# Patient Record
Sex: FEMALE | Race: WHITE | Employment: OTHER | URBAN - METROPOLITAN AREA
[De-identification: names, ages, dates, MRNs, and addresses within clinical notes are randomized per-mention and may not be internally consistent; named-entity substitution may affect disease eponyms.]

---

## 2017-01-06 ENCOUNTER — HOSPITAL ENCOUNTER (OUTPATIENT)
Dept: BONE DENSITY | Age: 57
Discharge: HOME | End: 2017-01-06
Attending: INTERNAL MEDICINE
Payer: COMMERCIAL

## 2017-01-06 ENCOUNTER — HOSPITAL ENCOUNTER (OUTPATIENT)
Dept: ULTRASOUND IMAGING | Facility: HOSPITAL | Age: 57
Discharge: HOME | End: 2017-01-06
Attending: INTERNAL MEDICINE
Payer: COMMERCIAL

## 2017-01-06 ENCOUNTER — HOSPITAL ENCOUNTER (OUTPATIENT)
Dept: MAMMOGRAPHY | Age: 57
Discharge: HOME | End: 2017-01-06
Attending: INTERNAL MEDICINE
Payer: COMMERCIAL

## 2017-01-06 DIAGNOSIS — Z12.31 ENCOUNTER FOR SCREENING MAMMOGRAM FOR MALIGNANT NEOPLASM OF BREAST: ICD-10-CM

## 2017-01-06 DIAGNOSIS — R22.32 MASS OF LEFT WRIST: ICD-10-CM

## 2017-01-06 DIAGNOSIS — Z13.820 SCREENING FOR OSTEOPOROSIS: ICD-10-CM

## 2017-01-06 DIAGNOSIS — Z12.31 ENCOUNTER FOR SCREENING MAMMOGRAM FOR BREAST CANCER: ICD-10-CM

## 2017-01-06 PROCEDURE — 77067 SCR MAMMO BI INCL CAD: CPT

## 2017-01-06 PROCEDURE — 76882 US LMTD JT/FCL EVL NVASC XTR: CPT

## 2017-01-06 PROCEDURE — 77063 BREAST TOMOSYNTHESIS BI: CPT

## 2017-01-06 PROCEDURE — 77080 DXA BONE DENSITY AXIAL: CPT

## 2017-01-07 NOTE — PROGRESS NOTES
Quick Note:    Palpable abnormality corresponds with a lipoma in the left proximal forearm.   Essie Kolb MD  ______

## 2018-01-16 ENCOUNTER — HOSPITAL ENCOUNTER (OUTPATIENT)
Age: 58
Discharge: HOME | End: 2018-01-16
Attending: FAMILY MEDICINE
Payer: COMMERCIAL

## 2018-01-16 VITALS
OXYGEN SATURATION: 96 % | HEART RATE: 116 BPM | TEMPERATURE: 99 F | RESPIRATION RATE: 20 BRPM | SYSTOLIC BLOOD PRESSURE: 140 MMHG | DIASTOLIC BLOOD PRESSURE: 57 MMHG

## 2018-01-16 DIAGNOSIS — J06.9 VIRAL UPPER RESPIRATORY TRACT INFECTION: ICD-10-CM

## 2018-01-16 DIAGNOSIS — J98.01 ACUTE BRONCHOSPASM: Primary | ICD-10-CM

## 2018-01-16 PROCEDURE — 99214 OFFICE O/P EST MOD 30 MIN: CPT

## 2018-01-16 PROCEDURE — 94640 AIRWAY INHALATION TREATMENT: CPT

## 2018-01-16 RX ORDER — ALBUTEROL SULFATE 90 UG/1
2 AEROSOL, METERED RESPIRATORY (INHALATION) EVERY 4 HOURS PRN
Qty: 1 INHALER | Refills: 0 | Status: SHIPPED | OUTPATIENT
Start: 2018-01-16 | End: 2018-04-19

## 2018-01-16 RX ORDER — PROMETHAZINE HYDROCHLORIDE AND CODEINE PHOSPHATE 6.25; 1 MG/5ML; MG/5ML
5 SYRUP ORAL NIGHTLY PRN
Qty: 120 ML | Refills: 0 | Status: SHIPPED | OUTPATIENT
Start: 2018-01-16 | End: 2018-01-26

## 2018-01-16 RX ORDER — IPRATROPIUM BROMIDE AND ALBUTEROL SULFATE 2.5; .5 MG/3ML; MG/3ML
3 SOLUTION RESPIRATORY (INHALATION) ONCE
Status: COMPLETED | OUTPATIENT
Start: 2018-01-16 | End: 2018-01-16

## 2018-01-16 RX ORDER — PREDNISONE 20 MG/1
40 TABLET ORAL ONCE
Status: COMPLETED | OUTPATIENT
Start: 2018-01-16 | End: 2018-01-16

## 2018-01-16 RX ORDER — PREDNISONE 20 MG/1
TABLET ORAL
Qty: 8 TABLET | Refills: 0 | Status: SHIPPED | OUTPATIENT
Start: 2018-01-16 | End: 2018-03-05

## 2018-01-16 NOTE — ED NOTES
Patient's  called re:spouse's rx of Albuterol requiring prior authorization of Albuterol inhaler. Discussed with Manolo Sosa [facility] to substitute inhaler.   Called to [facility] in [facility]-talked to Pharmacist Jayant Carrillo made aware to substitute inhaler per

## 2018-01-16 NOTE — ED PROVIDER NOTES
Patient Seen in: [address] [facility] END    History   Patient presents with:  Cough/URI    Stated Complaint: short of breath / cough x  1 day    HPI    This 80-year-old female presents to the office with complaint of nasal congestion, sore t airway patent, uvula midline  NECK:  No cervical lymphadenopathy. No thyromegaly,  HEART: Regular rate and rhythm, no S3, S4 or murmur noted. LUNGS: Coarse BS with prolonged expiratory phase with occasional expiratory wheeze noted. No egophony noted.  No r 1/17/18. Qty: 8 tablet Refills: 0  Associated Diagnoses:Acute bronchospasm    Albuterol Sulfate  (90 Base) MCG/ACT Inhalation Aero Soln  Inhale 2 puffs into the lungs every 4 (four) hours as needed for Wheezing or Shortness of Breath.  CARRY YOUR IN

## 2018-01-16 NOTE — ED INITIAL ASSESSMENT (HPI)
Pt began 4 days ago with slight tickle in her throat then yesterday noted it became worse.   Pt with chills, cough, vomiting during the night , and sore throat and ears

## 2018-02-27 ENCOUNTER — PRIOR ORIGINAL RECORDS (OUTPATIENT)
Dept: OTHER | Age: 58
End: 2018-02-27

## 2018-02-27 PROCEDURE — 84432 ASSAY OF THYROGLOBULIN: CPT | Performed by: INTERNAL MEDICINE

## 2018-02-27 PROCEDURE — 88175 CYTOPATH C/V AUTO FLUID REDO: CPT | Performed by: INTERNAL MEDICINE

## 2018-02-27 PROCEDURE — 86800 THYROGLOBULIN ANTIBODY: CPT | Performed by: INTERNAL MEDICINE

## 2018-03-06 ENCOUNTER — HOSPITAL ENCOUNTER (OUTPATIENT)
Dept: MAMMOGRAPHY | Age: 58
Discharge: HOME | End: 2018-03-06
Attending: INTERNAL MEDICINE
Payer: COMMERCIAL

## 2018-03-06 DIAGNOSIS — Z12.31 ENCOUNTER FOR SCREENING MAMMOGRAM FOR MALIGNANT NEOPLASM OF BREAST: ICD-10-CM

## 2018-03-06 PROCEDURE — 77063 BREAST TOMOSYNTHESIS BI: CPT | Performed by: INTERNAL MEDICINE

## 2018-03-06 PROCEDURE — 77067 SCR MAMMO BI INCL CAD: CPT | Performed by: INTERNAL MEDICINE

## 2018-03-22 ENCOUNTER — PRIOR ORIGINAL RECORDS (OUTPATIENT)
Dept: OTHER | Age: 58
End: 2018-03-22

## 2018-03-26 ENCOUNTER — MYAURORA ACCOUNT LINK (OUTPATIENT)
Dept: OTHER | Age: 58
End: 2018-03-26

## 2018-03-26 ENCOUNTER — HOSPITAL ENCOUNTER (OUTPATIENT)
Dept: CV DIAGNOSTICS | Facility: HOSPITAL | Age: 58
Discharge: HOME | End: 2018-03-26
Attending: INTERNAL MEDICINE

## 2018-03-26 DIAGNOSIS — I25.10 CAD IN NATIVE ARTERY: ICD-10-CM

## 2018-03-28 LAB
ALBUMIN: 4.1 G/DL
ALKALINE PHOSPHATATE(ALK PHOS): 100 IU/L
BILIRUBIN TOTAL: 0.54 MG/DL
BUN: 16 MG/DL
CALCIUM: 9.5 MG/DL
CHLORIDE: 103 MEQ/L
CHOLESTEROL, TOTAL: 232 MG/DL
CREATININE, SERUM: 0.76 MG/DL
FREE T4: 1.53 MG/DL
GLUCOSE: 114 MG/DL
HDL CHOLESTEROL: 43 MG/DL
HEMATOCRIT: 44 %
HEMOGLOBIN: 15 G/DL
LDL CHOLESTEROL: 135 MG/DL
PLATELETS: 232 K/UL
POTASSIUM, SERUM: 4.2 MEQ/L
PROTEIN, TOTAL: 7.7 G/DL
RED BLOOD COUNT: 5.07 X 10-6/U
SGOT (AST): 18 IU/L
SGPT (ALT): 25 IU/L
SODIUM: 139 MEQ/L
THYROID STIMULATING HORMONE: 0.01 MLU/L
TRIGLYCERIDES: 271 MG/DL
VITAMIN D 25-OH: 18.04 NG/ML
WHITE BLOOD COUNT: 7.05 X 10-3/U

## 2018-04-02 ENCOUNTER — PRIOR ORIGINAL RECORDS (OUTPATIENT)
Dept: OTHER | Age: 58
End: 2018-04-02

## 2018-04-02 ENCOUNTER — HOSPITAL ENCOUNTER (OUTPATIENT)
Dept: CV DIAGNOSTICS | Facility: HOSPITAL | Age: 58
Discharge: HOME | End: 2018-04-02
Attending: INTERNAL MEDICINE
Payer: COMMERCIAL

## 2018-04-02 DIAGNOSIS — I25.10 CORONARY ATHEROSCLEROSIS OF NATIVE CORONARY ARTERY: ICD-10-CM

## 2018-04-02 DIAGNOSIS — R93.1 ABNORMAL SCREENING CARDIAC CT: ICD-10-CM

## 2018-04-02 PROCEDURE — 93018 CV STRESS TEST I&R ONLY: CPT | Performed by: INTERNAL MEDICINE

## 2018-04-02 PROCEDURE — 93017 CV STRESS TEST TRACING ONLY: CPT | Performed by: INTERNAL MEDICINE

## 2018-04-02 PROCEDURE — 78452 HT MUSCLE IMAGE SPECT MULT: CPT | Performed by: INTERNAL MEDICINE

## 2018-04-04 ENCOUNTER — PRIOR ORIGINAL RECORDS (OUTPATIENT)
Dept: OTHER | Age: 58
End: 2018-04-04

## 2018-04-19 ENCOUNTER — OFFICE VISIT (OUTPATIENT)
Dept: FAMILY MEDICINE CLINIC | Facility: CLINIC | Age: 58
End: 2018-04-19

## 2018-04-19 VITALS
HEIGHT: 68 IN | TEMPERATURE: 98 F | RESPIRATION RATE: 20 BRPM | DIASTOLIC BLOOD PRESSURE: 68 MMHG | WEIGHT: 248 LBS | BODY MASS INDEX: 37.59 KG/M2 | OXYGEN SATURATION: 99 % | SYSTOLIC BLOOD PRESSURE: 138 MMHG | HEART RATE: 86 BPM

## 2018-04-19 DIAGNOSIS — F41.9 ANXIETY AND DEPRESSION: ICD-10-CM

## 2018-04-19 DIAGNOSIS — F32.A ANXIETY AND DEPRESSION: ICD-10-CM

## 2018-04-19 DIAGNOSIS — I25.10 CORONARY ARTERY DISEASE INVOLVING NATIVE CORONARY ARTERY OF NATIVE HEART WITHOUT ANGINA PECTORIS: Primary | ICD-10-CM

## 2018-04-19 DIAGNOSIS — E03.9 HYPOTHYROIDISM (ACQUIRED): ICD-10-CM

## 2018-04-19 PROCEDURE — 99203 OFFICE O/P NEW LOW 30 MIN: CPT | Performed by: FAMILY MEDICINE

## 2018-04-19 NOTE — PROGRESS NOTES
HPI:   Bebe Vega is a 62year old female here to establish care. Pt has h/o hashimoto's hypothyroid   Pt seeing dietician and working out regularly.   s/p endo yesterday and meds reduced due to TSH of 0.008    Pt had a high calcium score on her h otherwise  SKIN: denies any unusual skin lesions  EYES:denies blurred vision or double vision  HEENT: denies nasal congestion, sinus pain or ST  LUNGS: denies shortness of breath with exertion  CARDIOVASCULAR: denies chest pain on exertion  GI: denies abdo

## 2018-05-09 ENCOUNTER — PRIOR ORIGINAL RECORDS (OUTPATIENT)
Dept: OTHER | Age: 58
End: 2018-05-09

## 2018-05-15 LAB
ALBUMIN: 4.4 G/DL
ALKALINE PHOSPHATATE(ALK PHOS): 108 IU/L
BILIRUBIN TOTAL: 0.6 MG/DL
BNP: 41 PMOL/L
BUN: 16 MG/DL
CALCIUM: 9.6 MG/DL
CHLORIDE: 107 MEQ/L
CHOLESTEROL, TOTAL: 108 MG/DL
CREATININE, SERUM: 0.71 MG/DL
FREE T4: 1.8 MG/DL
GLOBULIN: 3.1 G/DL
GLUCOSE: 124 MG/DL
HDL CHOLESTEROL: 44 MG/DL
HEMATOCRIT: 44.5 %
HEMOGLOBIN A1C: 5.6 %
HEMOGLOBIN: 15.5 G/DL
LDL CHOLESTEROL: 40 MG/DL
PLATELETS: 243 K/UL
POTASSIUM, SERUM: 4.2 MEQ/L
PROTEIN, TOTAL: 7.5 G/DL
RED BLOOD COUNT: 5.16 X 10-6/U
SGOT (AST): 24 IU/L
SGPT (ALT): 28 IU/L
SODIUM: 139 MEQ/L
THYROID STIMULATING HORMONE: <0.01 MLU/L
TRIGLYCERIDES: 154 MG/DL
VITAMIN D 25-OH: 50 NG/ML
WHITE BLOOD COUNT: 6.2 X 10-3/U

## 2018-05-16 ENCOUNTER — PRIOR ORIGINAL RECORDS (OUTPATIENT)
Dept: OTHER | Age: 58
End: 2018-05-16

## 2018-06-18 ENCOUNTER — PRIOR ORIGINAL RECORDS (OUTPATIENT)
Dept: OTHER | Age: 58
End: 2018-06-18

## 2018-07-09 ENCOUNTER — OFFICE VISIT (OUTPATIENT)
Dept: FAMILY MEDICINE CLINIC | Facility: CLINIC | Age: 58
End: 2018-07-09

## 2018-07-09 VITALS
OXYGEN SATURATION: 97 % | SYSTOLIC BLOOD PRESSURE: 138 MMHG | HEIGHT: 68 IN | DIASTOLIC BLOOD PRESSURE: 88 MMHG | WEIGHT: 238 LBS | RESPIRATION RATE: 18 BRPM | HEART RATE: 74 BPM | BODY MASS INDEX: 36.07 KG/M2

## 2018-07-09 DIAGNOSIS — I25.10 CORONARY ARTERY DISEASE INVOLVING NATIVE CORONARY ARTERY OF NATIVE HEART WITHOUT ANGINA PECTORIS: Primary | ICD-10-CM

## 2018-07-09 DIAGNOSIS — E03.9 HYPOTHYROIDISM (ACQUIRED): ICD-10-CM

## 2018-07-09 DIAGNOSIS — F32.A ANXIETY AND DEPRESSION: ICD-10-CM

## 2018-07-09 DIAGNOSIS — F41.9 ANXIETY AND DEPRESSION: ICD-10-CM

## 2018-07-09 PROCEDURE — 99214 OFFICE O/P EST MOD 30 MIN: CPT | Performed by: FAMILY MEDICINE

## 2018-07-09 RX ORDER — VENLAFAXINE HYDROCHLORIDE 37.5 MG/1
37.5 CAPSULE, EXTENDED RELEASE ORAL DAILY
Qty: 60 CAPSULE | Refills: 0 | Status: SHIPPED | OUTPATIENT
Start: 2018-07-09 | End: 2018-08-08

## 2018-07-09 RX ORDER — HYDROCHLOROTHIAZIDE 12.5 MG/1
12.5 TABLET ORAL DAILY
COMMUNITY
Start: 2018-06-18

## 2018-07-09 NOTE — PROGRESS NOTES
HPI:   Natalya Doss is a 62year old female here for medication follow up. Pt has h/o hashimoto's hypothyroid   TSH is still very low   Pt seeing dietician and working out regularly.     Pt had a high calcium score on her heart scan.  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   Diet: watches calories closely     REVIEW OF SYSTEMS:   GENERAL: feels well otherwise  SKIN: denies any unusual skin lesions  EYES:denies blurred vision or double vision  HEENT: denies nasal congestion,

## 2018-07-14 DIAGNOSIS — F41.9 ANXIETY AND DEPRESSION: ICD-10-CM

## 2018-07-14 DIAGNOSIS — F32.A ANXIETY AND DEPRESSION: ICD-10-CM

## 2018-07-16 RX ORDER — VENLAFAXINE HYDROCHLORIDE 37.5 MG/1
CAPSULE, EXTENDED RELEASE ORAL
Qty: 60 CAPSULE | Refills: 0 | OUTPATIENT
Start: 2018-07-16

## 2018-08-06 ENCOUNTER — LAB ENCOUNTER (OUTPATIENT)
Dept: LAB | Age: 58
End: 2018-08-06
Attending: FAMILY MEDICINE
Payer: COMMERCIAL

## 2018-08-06 DIAGNOSIS — E03.9 HYPOTHYROIDISM (ACQUIRED): ICD-10-CM

## 2018-08-06 LAB
T4 FREE SERPL-MCNC: 1.6 NG/DL (ref 0.9–1.8)
TSI SER-ACNC: 0.01 MIU/ML (ref 0.35–5.5)

## 2018-08-06 PROCEDURE — 84439 ASSAY OF FREE THYROXINE: CPT

## 2018-08-06 PROCEDURE — 84443 ASSAY THYROID STIM HORMONE: CPT

## 2018-08-07 NOTE — PROGRESS NOTES
Telephone Information:  Mobile          [phone]    [facility] regarding Dr. Howard Bhupinder result note. Hours and number given.

## 2018-08-08 ENCOUNTER — OFFICE VISIT (OUTPATIENT)
Dept: FAMILY MEDICINE CLINIC | Facility: CLINIC | Age: 58
End: 2018-08-08
Payer: COMMERCIAL

## 2018-08-08 VITALS
BODY MASS INDEX: 36.07 KG/M2 | RESPIRATION RATE: 20 BRPM | DIASTOLIC BLOOD PRESSURE: 78 MMHG | HEIGHT: 68 IN | HEART RATE: 86 BPM | SYSTOLIC BLOOD PRESSURE: 122 MMHG | OXYGEN SATURATION: 98 % | WEIGHT: 238 LBS | TEMPERATURE: 99 F

## 2018-08-08 DIAGNOSIS — F41.9 ANXIETY AND DEPRESSION: ICD-10-CM

## 2018-08-08 DIAGNOSIS — F32.A ANXIETY AND DEPRESSION: ICD-10-CM

## 2018-08-08 DIAGNOSIS — E06.3 HASHIMOTO'S THYROIDITIS: ICD-10-CM

## 2018-08-08 PROCEDURE — 99214 OFFICE O/P EST MOD 30 MIN: CPT | Performed by: FAMILY MEDICINE

## 2018-08-08 RX ORDER — VENLAFAXINE HYDROCHLORIDE 75 MG/1
75 CAPSULE, EXTENDED RELEASE ORAL DAILY
Qty: 90 CAPSULE | Refills: 1 | Status: SHIPPED | OUTPATIENT
Start: 2018-08-08 | End: 2018-08-16

## 2018-08-08 RX ORDER — LEVOTHYROXINE SODIUM 0.12 MG/1
125 TABLET ORAL
Qty: 90 TABLET | Refills: 0 | Status: SHIPPED | OUTPATIENT
Start: 2018-08-08 | End: 2018-08-14

## 2018-08-08 NOTE — PROGRESS NOTES
Patient informed of Dr. Lacho Villafana result note. Patient verbalized understanding and agrees with plan.     Patient states she is currently taking levothyroxine 150mcg

## 2018-08-08 NOTE — PROGRESS NOTES
Patient informed of Dr. Nehemiah Grover result note. Per patient she is having Dr. Jamil Zarco handle her thyroid from here on out.

## 2018-08-08 NOTE — PROGRESS NOTES
HPI:   Socrates Hsieh is a 62year old female here for medication follow up. Pt has h/o hashimoto's hypothyroid   TSH is still very low   Pt seeing dietician and working out regularly.     Pt had a high calcium score on her heart scan.  s/p cards eval . Children: 2   Retired teacher    Exercise: three times per week.   Diet: watches calories closely     REVIEW OF SYSTEMS:   GENERAL: feels well otherwise  SKIN: denies any unusual skin lesions  EYES:denies blurred vision or double vision  HEENT: denies kelsie

## 2018-08-14 DIAGNOSIS — E06.3 HASHIMOTO'S THYROIDITIS: ICD-10-CM

## 2018-08-15 RX ORDER — LEVOTHYROXINE SODIUM 0.12 MG/1
TABLET ORAL
Qty: 90 TABLET | Refills: 0 | Status: SHIPPED | OUTPATIENT
Start: 2018-08-15 | End: 2018-11-20

## 2018-08-16 ENCOUNTER — TELEPHONE (OUTPATIENT)
Dept: FAMILY MEDICINE CLINIC | Facility: CLINIC | Age: 58
End: 2018-08-16

## 2018-08-16 DIAGNOSIS — F32.A ANXIETY AND DEPRESSION: ICD-10-CM

## 2018-08-16 DIAGNOSIS — F41.9 ANXIETY AND DEPRESSION: ICD-10-CM

## 2018-08-16 RX ORDER — VENLAFAXINE HYDROCHLORIDE 75 MG/1
75 CAPSULE, EXTENDED RELEASE ORAL DAILY
Qty: 90 CAPSULE | Refills: 1 | Status: SHIPPED | OUTPATIENT
Start: 2018-08-16 | End: 2019-02-18

## 2018-09-25 PROCEDURE — 86256 FLUORESCENT ANTIBODY TITER: CPT | Performed by: FAMILY MEDICINE

## 2018-09-25 PROCEDURE — 83516 IMMUNOASSAY NONANTIBODY: CPT | Performed by: FAMILY MEDICINE

## 2018-09-25 PROCEDURE — 82784 ASSAY IGA/IGD/IGG/IGM EACH: CPT | Performed by: FAMILY MEDICINE

## 2018-11-20 DIAGNOSIS — E06.3 HASHIMOTO'S THYROIDITIS: ICD-10-CM

## 2018-11-21 RX ORDER — LEVOTHYROXINE SODIUM 0.12 MG/1
TABLET ORAL
Qty: 30 TABLET | Refills: 0 | Status: SHIPPED | OUTPATIENT
Start: 2018-11-21 | End: 2019-02-15

## 2018-12-07 ENCOUNTER — PRIOR ORIGINAL RECORDS (OUTPATIENT)
Dept: OTHER | Age: 58
End: 2018-12-07

## 2018-12-07 ENCOUNTER — LAB ENCOUNTER (OUTPATIENT)
Dept: LAB | Age: 58
End: 2018-12-07
Attending: INTERNAL MEDICINE
Payer: COMMERCIAL

## 2018-12-07 DIAGNOSIS — E03.9 ACQUIRED HYPOTHYROIDISM: ICD-10-CM

## 2018-12-07 DIAGNOSIS — I25.10 CORONARY ATHEROSCLEROSIS OF NATIVE CORONARY ARTERY: Primary | ICD-10-CM

## 2018-12-07 DIAGNOSIS — E78.00 PURE HYPERCHOLESTEROLEMIA: ICD-10-CM

## 2018-12-07 PROCEDURE — 80053 COMPREHEN METABOLIC PANEL: CPT

## 2018-12-07 PROCEDURE — 80061 LIPID PANEL: CPT

## 2018-12-07 PROCEDURE — 36415 COLL VENOUS BLD VENIPUNCTURE: CPT

## 2018-12-07 PROCEDURE — 84443 ASSAY THYROID STIM HORMONE: CPT

## 2018-12-07 PROCEDURE — 84436 ASSAY OF TOTAL THYROXINE: CPT

## 2018-12-10 LAB
ALBUMIN: 3.8 G/DL
ALKALINE PHOSPHATATE(ALK PHOS): 98 IU/L
ALT (SGPT): 30 U/L
AST (SGOT): 21 U/L
BILIRUBIN TOTAL: 0.5 MG/DL
BUN: 13 MG/DL
CALCIUM: 9.1 MG/DL
CHLORIDE: 107 MEQ/L
CHOLESTEROL, TOTAL: 113 MG/DL
CHOLESTEROL, TOTAL: 113 MG/DL
CREATININE, SERUM: 0.77 MG/DL
GLOBULIN: 3.7 G/DL
GLUCOSE: 115 MG/DL
GLUCOSE: 115 MG/DL
HDL CHOLESTEROL: 48 MG/DL
HDL CHOLESTEROL: 48 MG/DL
LDL CHOLESTEROL: 39 MG/DL
LDL CHOLESTEROL: 39 MG/DL
NON-HDL CHOLESTEROL: 65 MG/DL
NON-HDL CHOLESTEROL: 65 MG/DL
POTASSIUM, SERUM: 3.7 MEQ/L
PROTEIN, TOTAL: 7.5 G/DL
SGOT (AST): 21 IU/L
SGPT (ALT): 30 IU/L
SODIUM: 141 MEQ/L
T4(THYROXINE): 12.6 MG/DL
THYROID STIMULATING HORMONE: 0.02 MLU/L
TRIGLYCERIDES: 128 MG/DL
TRIGLYCERIDES: 128 MG/DL

## 2018-12-11 ENCOUNTER — PRIOR ORIGINAL RECORDS (OUTPATIENT)
Dept: OTHER | Age: 58
End: 2018-12-11

## 2018-12-11 ENCOUNTER — MYAURORA ACCOUNT LINK (OUTPATIENT)
Dept: OTHER | Age: 58
End: 2018-12-11

## 2018-12-13 ENCOUNTER — TELEPHONE (OUTPATIENT)
Dept: FAMILY MEDICINE CLINIC | Facility: CLINIC | Age: 58
End: 2018-12-13

## 2018-12-13 DIAGNOSIS — Z79.899 MEDICATION DOSE CHANGED: ICD-10-CM

## 2018-12-13 DIAGNOSIS — E03.9 HYPOTHYROIDISM (ACQUIRED): Primary | ICD-10-CM

## 2018-12-13 NOTE — TELEPHONE ENCOUNTER
Pt states Dr. Vega Miles, cardiology, is requesting for Dr. Vanda Buckner to review recent thyroid labs and advise pt.

## 2018-12-14 RX ORDER — LEVOTHYROXINE SODIUM 112 UG/1
112 TABLET ORAL
Qty: 60 TABLET | Refills: 0 | Status: SHIPPED | OUTPATIENT
Start: 2018-12-14 | End: 2019-01-24

## 2018-12-14 NOTE — TELEPHONE ENCOUNTER
Informed pt of LE's recommendations, RX sent to pharmacy, lab orders entered. Pt expressed understanding and agreement. Task completed.

## 2019-01-25 RX ORDER — LEVOTHYROXINE SODIUM 112 UG/1
TABLET ORAL
Qty: 60 TABLET | Refills: 0 | Status: SHIPPED | OUTPATIENT
Start: 2019-01-25 | End: 2019-02-15

## 2019-01-25 NOTE — TELEPHONE ENCOUNTER
Rx Request  Levothyroxine Sodium 112 MCG Oral Tab    Disp:        60            R: 0    Last Visit: 08/08/2018    Last Refilled: 12/14/2018    Protocol Passed?  Yes[  ]       No[ x ]  TSH DUE

## 2019-02-11 ENCOUNTER — LAB ENCOUNTER (OUTPATIENT)
Dept: LAB | Age: 59
End: 2019-02-11
Attending: FAMILY MEDICINE
Payer: COMMERCIAL

## 2019-02-11 DIAGNOSIS — Z79.899 MEDICATION DOSE CHANGED: ICD-10-CM

## 2019-02-11 DIAGNOSIS — E06.3 HASHIMOTO'S THYROIDITIS: ICD-10-CM

## 2019-02-11 DIAGNOSIS — E03.9 HYPOTHYROIDISM (ACQUIRED): ICD-10-CM

## 2019-02-11 DIAGNOSIS — R73.01 IMPAIRED FASTING GLUCOSE: ICD-10-CM

## 2019-02-11 LAB
T3FREE SERPL-MCNC: 3.11 PG/ML (ref 2.3–4.2)
T4 FREE SERPL-MCNC: 1.3 NG/DL (ref 0.9–1.8)
TSI SER-ACNC: 0.01 MIU/ML (ref 0.35–5.5)

## 2019-02-11 PROCEDURE — 84439 ASSAY OF FREE THYROXINE: CPT | Performed by: FAMILY MEDICINE

## 2019-02-11 PROCEDURE — 84443 ASSAY THYROID STIM HORMONE: CPT | Performed by: FAMILY MEDICINE

## 2019-02-11 PROCEDURE — 36415 COLL VENOUS BLD VENIPUNCTURE: CPT | Performed by: FAMILY MEDICINE

## 2019-02-11 PROCEDURE — 84481 FREE ASSAY (FT-3): CPT | Performed by: FAMILY MEDICINE

## 2019-02-18 NOTE — PROGRESS NOTES
HPI:   Amelie Leong is a 62year old female here for medication follow up. Pt has h/o hashimoto's hypothyroid   TSH is still very low   Pt seeing dietician and working out regularly.   Pt c/o weak nails and acne   Chronic hot flashes     Pt had a hi • Heart Attack Father    • Hypertension Maternal Grandfather    • Heart Attack Maternal Grandfather    • Colon Cancer Brother    • Heart Attack Paternal Uncle       Social History:   Social History    Tobacco Use      Smoking status: Former Smoker      S by mouth before breakfast.  Dispense: 30 tablet; Refill: 0    2. Anxiety and depression  cpm   - Venlafaxine HCl ER (EFFEXOR XR) 75 MG Oral Capsule SR 24 Hr; Take 1 capsule (75 mg total) by mouth daily. Dispense: 90 capsule;  Refill: 1      Questions answe

## 2019-02-28 VITALS
WEIGHT: 237 LBS | DIASTOLIC BLOOD PRESSURE: 70 MMHG | HEIGHT: 68 IN | SYSTOLIC BLOOD PRESSURE: 124 MMHG | HEART RATE: 75 BPM | BODY MASS INDEX: 35.92 KG/M2

## 2019-02-28 VITALS
SYSTOLIC BLOOD PRESSURE: 145 MMHG | WEIGHT: 236 LBS | DIASTOLIC BLOOD PRESSURE: 80 MMHG | HEIGHT: 68 IN | BODY MASS INDEX: 35.77 KG/M2 | HEART RATE: 65 BPM

## 2019-02-28 VITALS
HEIGHT: 68 IN | DIASTOLIC BLOOD PRESSURE: 82 MMHG | HEART RATE: 72 BPM | SYSTOLIC BLOOD PRESSURE: 122 MMHG | BODY MASS INDEX: 37.89 KG/M2 | WEIGHT: 250 LBS

## 2019-03-20 DIAGNOSIS — E03.9 HYPOTHYROIDISM (ACQUIRED): ICD-10-CM

## 2019-03-21 RX ORDER — ATORVASTATIN CALCIUM 20 MG/1
TABLET, FILM COATED ORAL
Qty: 30 TABLET | Refills: 5 | Status: SHIPPED | OUTPATIENT
Start: 2019-03-21 | End: 2019-11-03 | Stop reason: SDUPTHER

## 2019-03-21 RX ORDER — LEVOTHYROXINE SODIUM 112 UG/1
TABLET ORAL
Qty: 60 TABLET | Refills: 0 | OUTPATIENT
Start: 2019-03-21

## 2019-03-21 NOTE — TELEPHONE ENCOUNTER
Rx Request  Levothyroxine Sodium 88 MCG Oral Tab    Disp:         30           R: 0      Associated Dx: Hypothyroidism (acquired    Last Visit: 02/18/2019    Last Refilled: 02/18/2019

## 2019-03-22 RX ORDER — LEVOTHYROXINE SODIUM 88 UG/1
TABLET ORAL
Qty: 30 TABLET | Refills: 0 | Status: SHIPPED | OUTPATIENT
Start: 2019-03-22 | End: 2019-04-05

## 2019-03-22 RX ORDER — LEVOTHYROXINE SODIUM 88 UG/1
TABLET ORAL
Qty: 30 TABLET | Refills: 0 | Status: SHIPPED | OUTPATIENT
Start: 2019-03-22 | End: 2019-03-22

## 2019-03-24 ENCOUNTER — APPOINTMENT (OUTPATIENT)
Dept: GENERAL RADIOLOGY | Age: 59
End: 2019-03-24
Attending: EMERGENCY MEDICINE
Payer: COMMERCIAL

## 2019-03-24 ENCOUNTER — HOSPITAL ENCOUNTER (EMERGENCY)
Age: 59
Discharge: HOME | End: 2019-03-24
Attending: EMERGENCY MEDICINE
Payer: COMMERCIAL

## 2019-03-24 VITALS
HEART RATE: 78 BPM | DIASTOLIC BLOOD PRESSURE: 79 MMHG | HEIGHT: 68 IN | WEIGHT: 240 LBS | BODY MASS INDEX: 36.37 KG/M2 | SYSTOLIC BLOOD PRESSURE: 133 MMHG | RESPIRATION RATE: 18 BRPM | OXYGEN SATURATION: 96 %

## 2019-03-24 DIAGNOSIS — S16.1XXA NECK STRAIN, INITIAL ENCOUNTER: Primary | ICD-10-CM

## 2019-03-24 DIAGNOSIS — M54.6 ACUTE RIGHT-SIDED THORACIC BACK PAIN: ICD-10-CM

## 2019-03-24 PROCEDURE — 71101 X-RAY EXAM UNILAT RIBS/CHEST: CPT | Performed by: EMERGENCY MEDICINE

## 2019-03-24 PROCEDURE — 72040 X-RAY EXAM NECK SPINE 2-3 VW: CPT | Performed by: EMERGENCY MEDICINE

## 2019-03-24 PROCEDURE — 99284 EMERGENCY DEPT VISIT MOD MDM: CPT

## 2019-03-24 RX ORDER — CYCLOBENZAPRINE HCL 10 MG
10 TABLET ORAL 3 TIMES DAILY PRN
Qty: 20 TABLET | Refills: 0 | Status: SHIPPED | OUTPATIENT
Start: 2019-03-24 | End: 2019-03-31

## 2019-03-24 NOTE — ED INITIAL ASSESSMENT (HPI)
Pt states that she fell last night at 0300 \"in the closet. I went to get the dog and I fell onto my face and neck. I'm having pain from my neck, scapula, and clavicle on the right side\". Pt denies LOC.

## 2019-03-24 NOTE — ED PROVIDER NOTES
Patient Seen in: [address] [facility] In [place]    History   Patient presents with:  Upper Extremity Injury (musculoskeletal)    Stated Complaint: fall during the night, right shoulder and face discomfort    HPI    57-year-old female presents reactive to light. Neck: There is some midline posterior lower cervical tenderness without crepitations or step-offs. There is bilateral paraspinous muscle spasm, right greater than left. No adenopathy or thyromegaly. Lungs are clear to auscultation. R-0

## 2019-03-25 RX ORDER — LEVOTHYROXINE SODIUM 112 UG/1
TABLET ORAL
Qty: 60 TABLET | Refills: 0 | OUTPATIENT
Start: 2019-03-25

## 2019-04-02 RX ORDER — HYDROCHLOROTHIAZIDE 12.5 MG/1
TABLET ORAL
COMMUNITY
Start: 2018-06-18 | End: 2019-08-13 | Stop reason: SDUPTHER

## 2019-04-02 RX ORDER — LEVOTHYROXINE SODIUM 88 UG/1
TABLET ORAL
COMMUNITY
Start: 2018-06-08

## 2019-04-04 ENCOUNTER — APPOINTMENT (OUTPATIENT)
Dept: LAB | Age: 59
End: 2019-04-04
Attending: FAMILY MEDICINE
Payer: COMMERCIAL

## 2019-04-04 DIAGNOSIS — E06.3 HASHIMOTO'S THYROIDITIS: ICD-10-CM

## 2019-04-04 PROCEDURE — 36415 COLL VENOUS BLD VENIPUNCTURE: CPT | Performed by: FAMILY MEDICINE

## 2019-04-04 PROCEDURE — 84481 FREE ASSAY (FT-3): CPT | Performed by: FAMILY MEDICINE

## 2019-04-04 PROCEDURE — 84439 ASSAY OF FREE THYROXINE: CPT | Performed by: FAMILY MEDICINE

## 2019-04-04 PROCEDURE — 84443 ASSAY THYROID STIM HORMONE: CPT | Performed by: FAMILY MEDICINE

## 2019-04-25 RX ORDER — LEVOTHYROXINE SODIUM 112 UG/1
TABLET ORAL
Qty: 60 TABLET | Refills: 0 | Status: SHIPPED | OUTPATIENT
Start: 2019-04-25 | End: 2020-01-20

## 2019-06-03 ENCOUNTER — LAB ENCOUNTER (OUTPATIENT)
Dept: LAB | Age: 59
End: 2019-06-03
Attending: INTERNAL MEDICINE
Payer: COMMERCIAL

## 2019-06-03 DIAGNOSIS — E78.1 PURE HYPERGLYCERIDEMIA: ICD-10-CM

## 2019-06-03 DIAGNOSIS — I25.10 CORONARY ATHEROSCLEROSIS OF NATIVE CORONARY ARTERY: Primary | ICD-10-CM

## 2019-06-03 DIAGNOSIS — E78.00 PURE HYPERCHOLESTEROLEMIA: ICD-10-CM

## 2019-06-03 LAB
ALBUMIN SERPL-MCNC: 3.7 G/DL
ALBUMIN/GLOB SERPL: NORMAL {RATIO}
ALP SERPL-CCNC: 100 U/L
ALT SERPL-CCNC: 29 U/L
ANION GAP SERPL CALC-SCNC: NORMAL MMOL/L
AST SERPL-CCNC: 20 U/L
BILIRUB SERPL-MCNC: 0.7 MG/DL
BUN SERPL-MCNC: 13 MG/DL
BUN/CREAT SERPL: NORMAL
CALCIUM SERPL-MCNC: 9.3 MG/DL
CHLORIDE SERPL-SCNC: 108 MMOL/L
CHOLEST SERPL-MCNC: 135 MG/DL
CHOLEST/HDLC SERPL: NORMAL {RATIO}
CO2 SERPL-SCNC: NORMAL MMOL/L
CREAT SERPL-MCNC: 0.75 MG/DL
GLOBULIN SER-MCNC: 3.8 G/DL
GLUCOSE SERPL-MCNC: 123 MG/DL
HDLC SERPL-MCNC: 54 MG/DL
LDLC SERPL CALC-MCNC: 52 MG/DL
LENGTH OF FAST TIME PATIENT: NORMAL H
LENGTH OF FAST TIME PATIENT: NORMAL H
NONHDLC SERPL-MCNC: NORMAL MG/DL
POTASSIUM SERPL-SCNC: 3.6 MMOL/L
PROT SERPL-MCNC: 7.5 G/DL
SODIUM SERPL-SCNC: 139 MMOL/L
T4 SERPL-MCNC: 10.1 UG/DL
TRIGL SERPL-MCNC: 145 MG/DL
TSH SERPL-ACNC: 0.43 M[IU]/L
VLDLC SERPL CALC-MCNC: NORMAL MG/DL

## 2019-06-03 PROCEDURE — 80053 COMPREHEN METABOLIC PANEL: CPT

## 2019-06-03 PROCEDURE — 84436 ASSAY OF TOTAL THYROXINE: CPT

## 2019-06-03 PROCEDURE — 84443 ASSAY THYROID STIM HORMONE: CPT

## 2019-06-03 PROCEDURE — 80061 LIPID PANEL: CPT

## 2019-06-03 PROCEDURE — 36415 COLL VENOUS BLD VENIPUNCTURE: CPT

## 2019-06-05 ENCOUNTER — TELEPHONE (OUTPATIENT)
Dept: CARDIOLOGY | Age: 59
End: 2019-06-05

## 2019-06-07 ENCOUNTER — CLINICAL ABSTRACT (OUTPATIENT)
Dept: CARDIOLOGY | Age: 59
End: 2019-06-07

## 2019-06-11 ENCOUNTER — OFFICE VISIT (OUTPATIENT)
Dept: CARDIOLOGY | Age: 59
End: 2019-06-11

## 2019-06-11 VITALS
HEIGHT: 68 IN | DIASTOLIC BLOOD PRESSURE: 86 MMHG | BODY MASS INDEX: 38.19 KG/M2 | SYSTOLIC BLOOD PRESSURE: 124 MMHG | WEIGHT: 252 LBS | HEART RATE: 80 BPM

## 2019-06-11 DIAGNOSIS — E78.00 PURE HYPERCHOLESTEROLEMIA: ICD-10-CM

## 2019-06-11 DIAGNOSIS — R53.81 MALAISE AND FATIGUE: ICD-10-CM

## 2019-06-11 DIAGNOSIS — E78.1 HYPERTRIGLYCERIDEMIA: ICD-10-CM

## 2019-06-11 DIAGNOSIS — I25.10 CORONARY ARTERY CALCIFICATION SEEN ON CT SCAN: ICD-10-CM

## 2019-06-11 DIAGNOSIS — R55 SYNCOPE, UNSPECIFIED SYNCOPE TYPE: ICD-10-CM

## 2019-06-11 DIAGNOSIS — R53.83 MALAISE AND FATIGUE: ICD-10-CM

## 2019-06-11 DIAGNOSIS — I10 HYPERTENSION, BENIGN: ICD-10-CM

## 2019-06-11 DIAGNOSIS — R06.02 SHORTNESS OF BREATH: Primary | ICD-10-CM

## 2019-06-11 DIAGNOSIS — E55.9 VITAMIN D DEFICIENCY: ICD-10-CM

## 2019-06-11 DIAGNOSIS — I25.10 CORONARY ARTERY DISEASE INVOLVING NATIVE CORONARY ARTERY OF NATIVE HEART WITHOUT ANGINA PECTORIS: ICD-10-CM

## 2019-06-11 PROBLEM — R06.00 DYSPNEA: Status: ACTIVE | Noted: 2018-03-22

## 2019-06-11 PROCEDURE — 3074F SYST BP LT 130 MM HG: CPT | Performed by: INTERNAL MEDICINE

## 2019-06-11 PROCEDURE — 99214 OFFICE O/P EST MOD 30 MIN: CPT | Performed by: INTERNAL MEDICINE

## 2019-06-11 PROCEDURE — 3079F DIAST BP 80-89 MM HG: CPT | Performed by: INTERNAL MEDICINE

## 2019-06-11 RX ORDER — VENLAFAXINE 75 MG/1
75 TABLET ORAL DAILY
COMMUNITY

## 2019-06-11 SDOH — HEALTH STABILITY: PHYSICAL HEALTH: ON AVERAGE, HOW MANY MINUTES DO YOU ENGAGE IN EXERCISE AT THIS LEVEL?: 60 MIN

## 2019-06-11 SDOH — HEALTH STABILITY: PHYSICAL HEALTH: ON AVERAGE, HOW MANY DAYS PER WEEK DO YOU ENGAGE IN MODERATE TO STRENUOUS EXERCISE (LIKE A BRISK WALK)?: 4 DAYS

## 2019-06-18 ENCOUNTER — HOSPITAL ENCOUNTER (OUTPATIENT)
Dept: CV DIAGNOSTICS | Age: 59
Discharge: HOME | End: 2019-06-18
Attending: INTERNAL MEDICINE
Payer: COMMERCIAL

## 2019-06-18 DIAGNOSIS — R55 SYNCOPE, UNSPECIFIED SYNCOPE TYPE: ICD-10-CM

## 2019-06-18 PROCEDURE — 93880 EXTRACRANIAL BILAT STUDY: CPT | Performed by: INTERNAL MEDICINE

## 2019-06-19 ENCOUNTER — TELEPHONE (OUTPATIENT)
Dept: CARDIOLOGY | Age: 59
End: 2019-06-19

## 2019-06-19 DIAGNOSIS — R55 SYNCOPE, UNSPECIFIED SYNCOPE TYPE: ICD-10-CM

## 2019-08-05 NOTE — PROGRESS NOTES
HPI:   Berta Morton is a 61year old female who presents for a complete physical exam.     Wt Readings from Last 6 Encounters:  08/06/19 : 255 lb  03/24/19 : 240 lb  02/18/19 : 243 lb  09/06/18 : 238 lb  08/08/18 : 238 lb  07/09/18 : 238 lb    Body mas tablet Rfl: 0   Venlafaxine HCl ER (EFFEXOR XR) 75 MG Oral Capsule SR 24 Hr Take 1 capsule (75 mg total) by mouth daily.  Disp: 90 capsule Rfl: 1   hydrochlorothiazide 12.5 MG Oral Tab  Disp:  Rfl:    BABY ASPIRIN OR  Disp:  Rfl:    atorvastatin 20 MG Oral headaches  PSYCHE: denies depression or anxiety  HEMATOLOGIC: denies hx of anemia  ENDOCRINE:  thyroid history  ALL/ASTHMA: denies hx of allergy or asthma    EXAM:   /84   Pulse 88   Temp 98.4 °F (36.9 °C) (Oral)   Resp 16   Ht 68\"   Wt 255 lb   SpO patient indicates understanding of these issues and agrees to the plan. Follow up in 6mo or sooner if needed   . HPI:   Sinai Arnold is a 61year old female here for medication follow up.      Pt has h/o hashimoto's hypothyroid   TSH is now normal • Heart Attack Father    • Hypertension Maternal Grandfather    • Heart Attack Maternal Grandfather    • Colon Cancer Brother    • Heart Attack Paternal Uncle       Social History:   Social History    Tobacco Use      Smoking status: Former Smoker      S mouth daily. Dispense: 90 capsule; Refill: 1    6. Hypothyroidism (acquired)    - Levothyroxine Sodium 88 MCG Oral Tab; TAKE 1 TABLET BY MOUTH ONE TIME A DAY before breakfast  Dispense: 90 tablet;  Refill: 0  d    Questions answered and patient indicates u

## 2019-08-06 ENCOUNTER — OFFICE VISIT (OUTPATIENT)
Dept: FAMILY MEDICINE CLINIC | Facility: CLINIC | Age: 59
End: 2019-08-06
Payer: COMMERCIAL

## 2019-08-06 VITALS
RESPIRATION RATE: 16 BRPM | HEART RATE: 88 BPM | HEIGHT: 68 IN | WEIGHT: 255 LBS | DIASTOLIC BLOOD PRESSURE: 84 MMHG | SYSTOLIC BLOOD PRESSURE: 124 MMHG | BODY MASS INDEX: 38.65 KG/M2 | OXYGEN SATURATION: 98 % | TEMPERATURE: 98 F

## 2019-08-06 DIAGNOSIS — E03.9 HYPOTHYROIDISM (ACQUIRED): ICD-10-CM

## 2019-08-06 DIAGNOSIS — Z13.820 SCREENING FOR OSTEOPOROSIS: ICD-10-CM

## 2019-08-06 DIAGNOSIS — F32.A ANXIETY AND DEPRESSION: ICD-10-CM

## 2019-08-06 DIAGNOSIS — F41.9 ANXIETY AND DEPRESSION: ICD-10-CM

## 2019-08-06 DIAGNOSIS — Z00.00 ANNUAL PHYSICAL EXAM: ICD-10-CM

## 2019-08-06 DIAGNOSIS — Z12.31 ENCOUNTER FOR MAMMOGRAM TO ESTABLISH BASELINE MAMMOGRAM: ICD-10-CM

## 2019-08-06 DIAGNOSIS — Z01.419 WELL WOMAN EXAM WITH ROUTINE GYNECOLOGICAL EXAM: Primary | ICD-10-CM

## 2019-08-06 PROCEDURE — 99396 PREV VISIT EST AGE 40-64: CPT | Performed by: FAMILY MEDICINE

## 2019-08-06 PROCEDURE — 99213 OFFICE O/P EST LOW 20 MIN: CPT | Performed by: FAMILY MEDICINE

## 2019-08-06 RX ORDER — VENLAFAXINE HYDROCHLORIDE 75 MG/1
75 CAPSULE, EXTENDED RELEASE ORAL DAILY
Qty: 90 CAPSULE | Refills: 1 | Status: SHIPPED | OUTPATIENT
Start: 2019-08-06 | End: 2020-01-20

## 2019-08-06 RX ORDER — LEVOTHYROXINE SODIUM 88 UG/1
TABLET ORAL
Qty: 90 TABLET | Refills: 0 | Status: SHIPPED | OUTPATIENT
Start: 2019-08-06 | End: 2019-11-10

## 2019-08-10 ENCOUNTER — APPOINTMENT (OUTPATIENT)
Dept: GENERAL RADIOLOGY | Age: 59
End: 2019-08-10
Attending: FAMILY MEDICINE
Payer: COMMERCIAL

## 2019-08-10 ENCOUNTER — HOSPITAL ENCOUNTER (OUTPATIENT)
Age: 59
Discharge: HOME | End: 2019-08-10
Attending: FAMILY MEDICINE
Payer: COMMERCIAL

## 2019-08-10 VITALS
RESPIRATION RATE: 18 BRPM | SYSTOLIC BLOOD PRESSURE: 130 MMHG | HEART RATE: 84 BPM | OXYGEN SATURATION: 99 % | DIASTOLIC BLOOD PRESSURE: 97 MMHG | TEMPERATURE: 98 F

## 2019-08-10 DIAGNOSIS — S82.832A CLOSED AVULSION FRACTURE OF DISTAL FIBULA, LEFT, INITIAL ENCOUNTER: Primary | ICD-10-CM

## 2019-08-10 DIAGNOSIS — S99.912A LEFT ANKLE INJURY, INITIAL ENCOUNTER: ICD-10-CM

## 2019-08-10 PROCEDURE — 99214 OFFICE O/P EST MOD 30 MIN: CPT

## 2019-08-10 PROCEDURE — 73610 X-RAY EXAM OF ANKLE: CPT | Performed by: FAMILY MEDICINE

## 2019-08-10 PROCEDURE — 73630 X-RAY EXAM OF FOOT: CPT | Performed by: FAMILY MEDICINE

## 2019-08-10 RX ORDER — ARM BRACE
EACH MISCELLANEOUS
Qty: 1 EACH | Refills: 0 | Status: SHIPPED | OUTPATIENT
Start: 2019-08-10 | End: 2020-09-03

## 2019-08-10 NOTE — ED PROVIDER NOTES
Patient Seen in: [facility] [place] END    History   Patient presents with:   Ankle Pain    Stated Complaint: LEFT ANKLE INJURY/PAIN X 3 DAYS    HPI    This 59-year-old female presents to the office with complaint of left ankle/foot injury s Current:BP (!) 130/97   Pulse 84   Temp 98 °F (36.7 °C)   Resp 18   SpO2 99%         Physical Exam    General: WH/obese/WD, in NAD at rest, A and O times 3  HEAD: Normocephalic, atraumatic  EYES: Sclera anicteric,  conjunctiva normal.  EARS: Tympanic of the lateral malleolus, suspicious for potential avulsion of the anterior talofibular ligament. There is associated moderate soft tissue swelling overlying the lateral malleolus. No additional osseous abnormalities. CONCLUSION:  1.  Small nondispla doses for ibuprofen are discussed. A copy of the x-ray disc is given. She is to follow-up with orthopedics in the next 2 to 3 days for fracture care.     Disposition and Plan     Clinical Impression:  Closed avulsion fracture of distal fibula, left, initi

## 2019-08-10 NOTE — ED INITIAL ASSESSMENT (HPI)
C/O left ankle pain that occurred on Thursday, has had frequent injuries to this ankle but no fractures.

## 2019-08-13 RX ORDER — HYDROCHLOROTHIAZIDE 12.5 MG/1
TABLET ORAL
Qty: 60 TABLET | Refills: 6 | Status: SHIPPED | OUTPATIENT
Start: 2019-08-13 | End: 2019-11-11 | Stop reason: SDUPTHER

## 2019-09-27 ENCOUNTER — HOSPITAL ENCOUNTER (OUTPATIENT)
Dept: MAMMOGRAPHY | Age: 59
Discharge: HOME | End: 2019-09-27
Attending: FAMILY MEDICINE
Payer: COMMERCIAL

## 2019-09-27 DIAGNOSIS — Z12.31 ENCOUNTER FOR MAMMOGRAM TO ESTABLISH BASELINE MAMMOGRAM: ICD-10-CM

## 2019-09-27 PROCEDURE — 77067 SCR MAMMO BI INCL CAD: CPT | Performed by: FAMILY MEDICINE

## 2019-09-27 PROCEDURE — 77063 BREAST TOMOSYNTHESIS BI: CPT | Performed by: FAMILY MEDICINE

## 2019-11-04 RX ORDER — ATORVASTATIN CALCIUM 20 MG/1
TABLET, FILM COATED ORAL
Qty: 30 TABLET | Refills: 6 | Status: SHIPPED | OUTPATIENT
Start: 2019-11-04 | End: 2019-12-17 | Stop reason: SDUPTHER

## 2019-11-10 DIAGNOSIS — E03.9 HYPOTHYROIDISM (ACQUIRED): ICD-10-CM

## 2019-11-11 DIAGNOSIS — E03.9 HYPOTHYROIDISM (ACQUIRED): ICD-10-CM

## 2019-11-11 DIAGNOSIS — F32.A ANXIETY AND DEPRESSION: ICD-10-CM

## 2019-11-11 DIAGNOSIS — F41.9 ANXIETY AND DEPRESSION: ICD-10-CM

## 2019-11-11 RX ORDER — LEVOTHYROXINE SODIUM 88 UG/1
TABLET ORAL
Qty: 90 TABLET | Refills: 0 | OUTPATIENT
Start: 2019-11-11

## 2019-11-11 RX ORDER — HYDROCHLOROTHIAZIDE 12.5 MG/1
TABLET ORAL
Qty: 90 TABLET | Refills: 0 | Status: SHIPPED | OUTPATIENT
Start: 2019-11-11 | End: 2019-12-17 | Stop reason: SDUPTHER

## 2019-11-11 RX ORDER — ATORVASTATIN CALCIUM 20 MG/1
TABLET, FILM COATED ORAL
Qty: 30 TABLET | Refills: 4 | OUTPATIENT
Start: 2019-11-11

## 2019-11-11 RX ORDER — LEVOTHYROXINE SODIUM 88 UG/1
TABLET ORAL
Qty: 90 TABLET | Refills: 0 | Status: SHIPPED | OUTPATIENT
Start: 2019-11-11 | End: 2020-02-13

## 2019-11-11 RX ORDER — VENLAFAXINE HYDROCHLORIDE 75 MG/1
CAPSULE, EXTENDED RELEASE ORAL
Qty: 30 CAPSULE | Refills: 0 | Status: SHIPPED | OUTPATIENT
Start: 2019-11-11 | End: 2020-01-22

## 2019-12-13 ENCOUNTER — LAB ENCOUNTER (OUTPATIENT)
Dept: LAB | Age: 59
End: 2019-12-13
Attending: FAMILY MEDICINE
Payer: COMMERCIAL

## 2019-12-13 DIAGNOSIS — E03.9 HYPOTHYROIDISM (ACQUIRED): ICD-10-CM

## 2019-12-13 DIAGNOSIS — R73.09 ELEVATED GLUCOSE: ICD-10-CM

## 2019-12-13 DIAGNOSIS — Z00.00 ANNUAL PHYSICAL EXAM: ICD-10-CM

## 2019-12-13 LAB
25(OH)D3+25(OH)D2 SERPL-MCNC: 38.9 NG/ML
ABSOLUTE IMMATURE GRANULOCYTES (OFFPRE24): NORMAL
ALBUMIN SERPL-MCNC: 3.7 G/DL
ALBUMIN/GLOB SERPL: NORMAL {RATIO}
ALP SERPL-CCNC: 101 U/L
ALT SERPL-CCNC: 30 U/L
ANION GAP SERPL CALC-SCNC: NORMAL MMOL/L
AST SERPL-CCNC: 22 U/L
BASO+EOS+MONOS # BLD: NORMAL 10*3/UL
BASO+EOS+MONOS NFR BLD: NORMAL %
BASOPHILS # BLD: NORMAL 10*3/UL
BASOPHILS NFR BLD: NORMAL %
BILIRUB SERPL-MCNC: 0.6 MG/DL
BUN SERPL-MCNC: 13 MG/DL
BUN/CREAT SERPL: NORMAL
CALCIUM SERPL-MCNC: 9.1 MG/DL
CHLORIDE SERPL-SCNC: 108 MMOL/L
CHOLEST SERPL-MCNC: 136 MG/DL
CHOLEST/HDLC SERPL: NORMAL {RATIO}
CO2 SERPL-SCNC: NORMAL MMOL/L
CREAT SERPL-MCNC: 0.84 MG/DL
DIFFERENTIAL METHOD BLD: NORMAL
EOSINOPHIL # BLD: NORMAL 10*3/UL
EOSINOPHIL NFR BLD: NORMAL %
ERYTHROCYTE [DISTWIDTH] IN BLOOD: NORMAL %
EST. AVERAGE GLUCOSE BLD GHB EST-MCNC: NORMAL MG/DL
GLOBULIN SER-MCNC: 4.2 G/DL
GLUCOSE SERPL-MCNC: 122 MG/DL
HBA1C MFR BLD: 6 %
HBA1C MFR BLD: NORMAL % (ref 4.5–5.6)
HCT VFR BLD CALC: 46 %
HDLC SERPL-MCNC: 52 MG/DL
HGB BLD-MCNC: 15.4 G/DL
IMMATURE GRANULOCYTES (OFFPRE25): NORMAL
LDLC SERPL CALC-MCNC: 61 MG/DL
LENGTH OF FAST TIME PATIENT: YES H
LENGTH OF FAST TIME PATIENT: YES H
LYMPHOCYTES # BLD: NORMAL 10*3/UL
LYMPHOCYTES NFR BLD: NORMAL %
MCH RBC QN AUTO: NORMAL PG
MCHC RBC AUTO-ENTMCNC: NORMAL G/DL
MCV RBC AUTO: NORMAL FL
MONOCYTES # BLD: NORMAL 10*3/UL
MONOCYTES NFR BLD: NORMAL %
MPV (OFFPRE2): NORMAL
NEUTROPHILS # BLD: NORMAL 10*3/UL
NEUTROPHILS NFR BLD: NORMAL %
NONHDLC SERPL-MCNC: 84 MG/DL
NRBC BLD MANUAL-RTO: NORMAL %
PLAT MORPH BLD: NORMAL
PLATELET # BLD: 257 10*3/UL
POTASSIUM SERPL-SCNC: 4.1 MMOL/L
PROT SERPL-MCNC: 7.9 G/DL
RBC # BLD: 5.23 10*6/UL
RBC MORPH BLD: NORMAL
SODIUM SERPL-SCNC: 139 MMOL/L
T4 FREE SERPL-MCNC: 0.9 NG/DL
TRIGL SERPL-MCNC: 117 MG/DL
TSH SERPL-ACNC: 2.21 M[IU]/L
VLDLC SERPL CALC-MCNC: NORMAL MG/DL
WBC # BLD: 8.7 10*3/UL
WBC MORPH BLD: NORMAL

## 2019-12-13 PROCEDURE — 84481 FREE ASSAY (FT-3): CPT | Performed by: FAMILY MEDICINE

## 2019-12-13 PROCEDURE — 82306 VITAMIN D 25 HYDROXY: CPT | Performed by: FAMILY MEDICINE

## 2019-12-13 PROCEDURE — 82607 VITAMIN B-12: CPT | Performed by: FAMILY MEDICINE

## 2019-12-13 PROCEDURE — 80050 GENERAL HEALTH PANEL: CPT | Performed by: FAMILY MEDICINE

## 2019-12-13 PROCEDURE — 36415 COLL VENOUS BLD VENIPUNCTURE: CPT | Performed by: FAMILY MEDICINE

## 2019-12-13 PROCEDURE — 80061 LIPID PANEL: CPT | Performed by: FAMILY MEDICINE

## 2019-12-13 PROCEDURE — 83036 HEMOGLOBIN GLYCOSYLATED A1C: CPT | Performed by: FAMILY MEDICINE

## 2019-12-13 PROCEDURE — 84439 ASSAY OF FREE THYROXINE: CPT | Performed by: FAMILY MEDICINE

## 2019-12-14 DIAGNOSIS — R73.09 ELEVATED GLUCOSE: Primary | ICD-10-CM

## 2019-12-17 ENCOUNTER — OFFICE VISIT (OUTPATIENT)
Dept: CARDIOLOGY | Age: 59
End: 2019-12-17

## 2019-12-17 VITALS
HEART RATE: 83 BPM | DIASTOLIC BLOOD PRESSURE: 70 MMHG | SYSTOLIC BLOOD PRESSURE: 121 MMHG | WEIGHT: 263 LBS | HEIGHT: 68 IN | BODY MASS INDEX: 39.86 KG/M2

## 2019-12-17 DIAGNOSIS — R06.02 SHORTNESS OF BREATH: Primary | ICD-10-CM

## 2019-12-17 DIAGNOSIS — R53.81 MALAISE AND FATIGUE: ICD-10-CM

## 2019-12-17 DIAGNOSIS — I25.10 CORONARY ARTERY CALCIFICATION SEEN ON CT SCAN: ICD-10-CM

## 2019-12-17 DIAGNOSIS — R53.83 MALAISE AND FATIGUE: ICD-10-CM

## 2019-12-17 DIAGNOSIS — I10 HYPERTENSION, BENIGN: ICD-10-CM

## 2019-12-17 DIAGNOSIS — E55.9 VITAMIN D DEFICIENCY: ICD-10-CM

## 2019-12-17 DIAGNOSIS — I25.10 CORONARY ARTERY DISEASE INVOLVING NATIVE CORONARY ARTERY OF NATIVE HEART WITHOUT ANGINA PECTORIS: ICD-10-CM

## 2019-12-17 DIAGNOSIS — E78.1 HYPERTRIGLYCERIDEMIA: ICD-10-CM

## 2019-12-17 DIAGNOSIS — E78.00 PURE HYPERCHOLESTEROLEMIA: ICD-10-CM

## 2019-12-17 PROCEDURE — 99214 OFFICE O/P EST MOD 30 MIN: CPT | Performed by: INTERNAL MEDICINE

## 2019-12-17 PROCEDURE — 3078F DIAST BP <80 MM HG: CPT | Performed by: INTERNAL MEDICINE

## 2019-12-17 PROCEDURE — 3074F SYST BP LT 130 MM HG: CPT | Performed by: INTERNAL MEDICINE

## 2019-12-17 RX ORDER — HYDROCHLOROTHIAZIDE 12.5 MG/1
12.5 TABLET ORAL DAILY
Qty: 90 TABLET | Refills: 3 | Status: SHIPPED | OUTPATIENT
Start: 2019-12-17 | End: 2021-04-27 | Stop reason: SDUPTHER

## 2019-12-17 RX ORDER — ATORVASTATIN CALCIUM 20 MG/1
20 TABLET, FILM COATED ORAL AT BEDTIME
Qty: 90 TABLET | Refills: 3 | Status: SHIPPED | OUTPATIENT
Start: 2019-12-17 | End: 2020-06-23 | Stop reason: SDUPTHER

## 2019-12-17 SDOH — HEALTH STABILITY: PHYSICAL HEALTH: ON AVERAGE, HOW MANY DAYS PER WEEK DO YOU ENGAGE IN MODERATE TO STRENUOUS EXERCISE (LIKE A BRISK WALK)?: 2 DAYS

## 2019-12-17 SDOH — HEALTH STABILITY: MENTAL HEALTH: HOW OFTEN DO YOU HAVE A DRINK CONTAINING ALCOHOL?: MONTHLY OR LESS

## 2019-12-17 SDOH — HEALTH STABILITY: PHYSICAL HEALTH: ON AVERAGE, HOW MANY MINUTES DO YOU ENGAGE IN EXERCISE AT THIS LEVEL?: 90 MIN

## 2019-12-17 ASSESSMENT — PATIENT HEALTH QUESTIONNAIRE - PHQ9
1. LITTLE INTEREST OR PLEASURE IN DOING THINGS: NOT AT ALL
2. FEELING DOWN, DEPRESSED OR HOPELESS: SEVERAL DAYS
SUM OF ALL RESPONSES TO PHQ9 QUESTIONS 1 AND 2: 1
SUM OF ALL RESPONSES TO PHQ9 QUESTIONS 1 AND 2: 1

## 2020-01-03 ENCOUNTER — CLINICAL ABSTRACT (OUTPATIENT)
Dept: CARDIOLOGY | Age: 60
End: 2020-01-03

## 2020-01-17 PROBLEM — R06.00 DYSPNEA: Status: ACTIVE | Noted: 2018-03-22

## 2020-01-17 PROBLEM — I25.10 CORONARY ARTERY CALCIFICATION SEEN ON CT SCAN: Status: ACTIVE | Noted: 2018-03-22

## 2020-01-17 PROBLEM — E55.9 VITAMIN D DEFICIENCY: Status: ACTIVE | Noted: 2018-03-22

## 2020-01-17 PROBLEM — I10 HYPERTENSION, BENIGN: Status: ACTIVE | Noted: 2018-12-11

## 2020-01-17 PROBLEM — R53.81 MALAISE AND FATIGUE: Status: ACTIVE | Noted: 2018-03-22

## 2020-01-17 PROBLEM — E78.00 PURE HYPERCHOLESTEROLEMIA: Status: ACTIVE | Noted: 2018-03-22

## 2020-01-17 PROBLEM — I25.10 CAD (CORONARY ARTERY DISEASE), NATIVE CORONARY ARTERY: Status: ACTIVE | Noted: 2018-03-22

## 2020-01-17 PROBLEM — R53.83 MALAISE AND FATIGUE: Status: ACTIVE | Noted: 2018-03-22

## 2020-01-19 NOTE — PROGRESS NOTES
HISTORY OF PRESENT ILLNESS  Patient presents with:  Weight Problem: cardiology referral. no weight loss meds.  3 days weekly exercise    Nikki Encarnacion is a 61year old female new to our office today for initiation of medical weight loss program.  Patient negative   Kidney stones: negative   Liver disease: negative  Joint-related conditions:negative  Migraines/seizures: negative  Glaucoma: negative   Depression/anxiety: depression/ anxiety   Constipation: negative  DVT: negative  Family or personal history AGRATIO 1.3 11/21/2013     12/13/2019    K 4.1 12/13/2019     12/13/2019    CO2 25.0 12/13/2019     Lab Results   Component Value Date     12/13/2019    A1C 6.0 (H) 12/13/2019     Lab Results   Component Value Date    CHOLEST 136 12/13/2 Tab    (E55.9) Vitamin D deficiency    (E03.9) Hypothyroidism (acquired)    (I10) Hypertension, benign    (E78.00) Pure hypercholesterolemia    (F41.9,  F32.9) Anxiety and depression    (I25.10) Coronary artery calcification seen on CT scan    (R73.03) Pre control  -Limit carbohydrates  -Eat breakfast every day   -Don't skip meals   -Read nutrition labels and keep a food log  -drink a lot of water 65 oz of water per day  - Do not drink your calories (no soda, juice, high calorie coffee drinks, limit alcohol) Lose 1.5-2 lbs per week                Activity level: not very active (can't count exercise towards calorie number per day)                   ** Daily INPUT> Look at nutrition section-- \"nutrients\" and it will break down your macros for the day (ie.

## 2020-01-20 ENCOUNTER — OFFICE VISIT (OUTPATIENT)
Dept: INTERNAL MEDICINE CLINIC | Facility: CLINIC | Age: 60
End: 2020-01-20
Payer: COMMERCIAL

## 2020-01-20 VITALS
HEIGHT: 67.5 IN | WEIGHT: 265 LBS | BODY MASS INDEX: 41.11 KG/M2 | DIASTOLIC BLOOD PRESSURE: 84 MMHG | SYSTOLIC BLOOD PRESSURE: 138 MMHG | RESPIRATION RATE: 16 BRPM | HEART RATE: 80 BPM

## 2020-01-20 DIAGNOSIS — E03.9 HYPOTHYROIDISM (ACQUIRED): ICD-10-CM

## 2020-01-20 DIAGNOSIS — F32.A ANXIETY AND DEPRESSION: ICD-10-CM

## 2020-01-20 DIAGNOSIS — Z51.81 THERAPEUTIC DRUG MONITORING: Primary | ICD-10-CM

## 2020-01-20 DIAGNOSIS — E78.00 PURE HYPERCHOLESTEROLEMIA: ICD-10-CM

## 2020-01-20 DIAGNOSIS — E55.9 VITAMIN D DEFICIENCY: ICD-10-CM

## 2020-01-20 DIAGNOSIS — E66.01 CLASS 3 SEVERE OBESITY WITHOUT SERIOUS COMORBIDITY WITH BODY MASS INDEX (BMI) OF 40.0 TO 44.9 IN ADULT, UNSPECIFIED OBESITY TYPE (HCC): ICD-10-CM

## 2020-01-20 DIAGNOSIS — F41.9 ANXIETY AND DEPRESSION: ICD-10-CM

## 2020-01-20 DIAGNOSIS — I10 HYPERTENSION, BENIGN: ICD-10-CM

## 2020-01-20 DIAGNOSIS — I25.10 CORONARY ARTERY CALCIFICATION SEEN ON CT SCAN: ICD-10-CM

## 2020-01-20 DIAGNOSIS — R73.03 PREDIABETES: ICD-10-CM

## 2020-01-20 PROCEDURE — 99204 OFFICE O/P NEW MOD 45 MIN: CPT | Performed by: NURSE PRACTITIONER

## 2020-01-20 NOTE — PATIENT INSTRUCTIONS
PLAN:  Continue with medications: metformin 500mg (1 tablet in the AM and 1 tablet before dinner)   Follow up with me in 1 month  Schedule follow up appointments:  Dietitian/nutritionist      Please try to work on the following dietary changes:  1.  Drink l sleep, and shahzad to your daily life.

## 2020-01-21 ENCOUNTER — TELEPHONE (OUTPATIENT)
Dept: INTERNAL MEDICINE CLINIC | Facility: CLINIC | Age: 60
End: 2020-01-21

## 2020-01-21 NOTE — TELEPHONE ENCOUNTER
Called and left a detailed message for patient letting patient know that per their insurance carrier, they do not have any Out of Network benefits on their plan for Dietitian services.  [place] is showing as Out of Network but Rosalinda Canas is s

## 2020-01-22 DIAGNOSIS — F32.A ANXIETY AND DEPRESSION: ICD-10-CM

## 2020-01-22 DIAGNOSIS — F41.9 ANXIETY AND DEPRESSION: ICD-10-CM

## 2020-01-22 RX ORDER — VENLAFAXINE HYDROCHLORIDE 75 MG/1
CAPSULE, EXTENDED RELEASE ORAL
Qty: 30 CAPSULE | Refills: 0 | Status: SHIPPED | OUTPATIENT
Start: 2020-01-22 | End: 2020-02-17

## 2020-01-22 NOTE — TELEPHONE ENCOUNTER
Rx Request  VENLAFAXINE HCL ER 75 MG Oral Capsule SR 24 Hr    Disp:     30               R: 0    Associated Dx: Anxiety and depression    Last Visit: 08/06/2019    Last Refilled: 11/11/2019    Protocol Passed?  Juan Carlos Walker  ]       No[  ]

## 2020-01-23 ENCOUNTER — TELEPHONE (OUTPATIENT)
Dept: INTERNAL MEDICINE CLINIC | Facility: CLINIC | Age: 60
End: 2020-01-23

## 2020-01-23 NOTE — TELEPHONE ENCOUNTER
At last visit it is noted:  PLAN   Schedule appt with nutritionist, start using MFP or paper charting  Start to increase exercising/activity   Decrease carbs, increase protein  Body composition test results given  No skipping meals  Will send message to Ca

## 2020-01-29 ENCOUNTER — TELEPHONE (OUTPATIENT)
Dept: CARDIOLOGY | Age: 60
End: 2020-01-29

## 2020-01-30 NOTE — TELEPHONE ENCOUNTER
Received fax from Dr. Sarah Fernandez allowing MD to prescribe phentermine for patient.   Placed in your inbox

## 2020-01-30 NOTE — TELEPHONE ENCOUNTER
Can you please call the patient back and tell her that we are ok with doing phentermine, if she is ok with it, please pend quant #30 no refill 0 and discuss when and how to take.

## 2020-01-30 NOTE — TELEPHONE ENCOUNTER
Patient returned call and will begin Phentermine. I verified with AMF that she wants her on 37.5 mg daily. She will start at 1/2 tablet the first 4 days then increase to 1 tablet.  I tried to call  RX to [place] and I was left on hold after speaking with reagan

## 2020-01-31 RX ORDER — PHENTERMINE HYDROCHLORIDE 37.5 MG/1
TABLET ORAL
Qty: 30 TABLET | Refills: 0 | Status: SHIPPED | OUTPATIENT
Start: 2020-01-31 | End: 2020-03-18

## 2020-02-13 DIAGNOSIS — E03.9 HYPOTHYROIDISM (ACQUIRED): ICD-10-CM

## 2020-02-13 RX ORDER — LEVOTHYROXINE SODIUM 88 UG/1
TABLET ORAL
Qty: 90 TABLET | Refills: 0 | Status: SHIPPED | OUTPATIENT
Start: 2020-02-13 | End: 2020-02-17

## 2020-02-17 ENCOUNTER — OFFICE VISIT (OUTPATIENT)
Dept: FAMILY MEDICINE CLINIC | Facility: CLINIC | Age: 60
End: 2020-02-17
Payer: COMMERCIAL

## 2020-02-17 VITALS
DIASTOLIC BLOOD PRESSURE: 76 MMHG | RESPIRATION RATE: 18 BRPM | OXYGEN SATURATION: 98 % | SYSTOLIC BLOOD PRESSURE: 130 MMHG | BODY MASS INDEX: 40.33 KG/M2 | HEART RATE: 78 BPM | HEIGHT: 67.5 IN | WEIGHT: 260 LBS

## 2020-02-17 DIAGNOSIS — F41.9 ANXIETY AND DEPRESSION: ICD-10-CM

## 2020-02-17 DIAGNOSIS — F32.A ANXIETY AND DEPRESSION: ICD-10-CM

## 2020-02-17 DIAGNOSIS — E03.9 HYPOTHYROIDISM (ACQUIRED): ICD-10-CM

## 2020-02-17 PROCEDURE — 99214 OFFICE O/P EST MOD 30 MIN: CPT | Performed by: FAMILY MEDICINE

## 2020-02-17 RX ORDER — LEVOTHYROXINE SODIUM 88 UG/1
88 TABLET ORAL
Qty: 90 TABLET | Refills: 1 | Status: SHIPPED | OUTPATIENT
Start: 2020-02-17 | End: 2020-08-26

## 2020-02-17 RX ORDER — VENLAFAXINE HYDROCHLORIDE 75 MG/1
CAPSULE, EXTENDED RELEASE ORAL
Qty: 90 CAPSULE | Refills: 1 | Status: SHIPPED | OUTPATIENT
Start: 2020-02-17 | End: 2020-08-26

## 2020-02-17 NOTE — PROGRESS NOTES
HPI:   Birdie Kauffman is a 61year old female here for medication follow up.      Pt has h/o hashimoto's hypothyroid   TSH is now normal     Pt had a high calcium score on her heart scan.  s/p cards eval ; now on asa / statin / co q 10  Sees Dr. Julita Chung Night sweats    • Thyroid disease    • Weight gain       Past Surgical History:   Procedure Laterality Date   • BACK SURGERY     •       X 2      Family History   Problem Relation Age of Onset   • Breast Cancer Maternal Grandmother 48   • Diabetes cyanosis, clubbing or edema  NEURO: cranial nerves are intact,motor and sensory are grossly intact    ASSESSMENT AND PLAN:   Izabella Stiles is a 61year old female here with   1. Hypothyroidism (acquired)    - FREE T4 (FREE THYROXINE);  Future  - ASSAY, T

## 2020-02-18 ENCOUNTER — OFFICE VISIT (OUTPATIENT)
Dept: INTERNAL MEDICINE CLINIC | Facility: CLINIC | Age: 60
End: 2020-02-18
Payer: COMMERCIAL

## 2020-02-18 VITALS
HEART RATE: 98 BPM | HEIGHT: 67.5 IN | BODY MASS INDEX: 40.02 KG/M2 | SYSTOLIC BLOOD PRESSURE: 122 MMHG | DIASTOLIC BLOOD PRESSURE: 82 MMHG | RESPIRATION RATE: 16 BRPM | WEIGHT: 258 LBS

## 2020-02-18 DIAGNOSIS — F32.A ANXIETY AND DEPRESSION: ICD-10-CM

## 2020-02-18 DIAGNOSIS — E03.9 HYPOTHYROIDISM (ACQUIRED): ICD-10-CM

## 2020-02-18 DIAGNOSIS — Z51.81 THERAPEUTIC DRUG MONITORING: Primary | ICD-10-CM

## 2020-02-18 DIAGNOSIS — R73.03 PREDIABETES: ICD-10-CM

## 2020-02-18 DIAGNOSIS — I25.10 CORONARY ARTERY CALCIFICATION SEEN ON CT SCAN: ICD-10-CM

## 2020-02-18 DIAGNOSIS — F41.9 ANXIETY AND DEPRESSION: ICD-10-CM

## 2020-02-18 DIAGNOSIS — E55.9 VITAMIN D DEFICIENCY: ICD-10-CM

## 2020-02-18 DIAGNOSIS — E78.00 PURE HYPERCHOLESTEROLEMIA: ICD-10-CM

## 2020-02-18 DIAGNOSIS — E66.01 CLASS 3 SEVERE OBESITY WITHOUT SERIOUS COMORBIDITY WITH BODY MASS INDEX (BMI) OF 40.0 TO 44.9 IN ADULT, UNSPECIFIED OBESITY TYPE (HCC): ICD-10-CM

## 2020-02-18 DIAGNOSIS — I10 HYPERTENSION, BENIGN: ICD-10-CM

## 2020-02-18 PROCEDURE — 99214 OFFICE O/P EST MOD 30 MIN: CPT | Performed by: NURSE PRACTITIONER

## 2020-02-18 RX ORDER — PHENTERMINE HYDROCHLORIDE 37.5 MG/1
37.5 TABLET ORAL
Qty: 30 TABLET | Refills: 0 | Status: SHIPPED | OUTPATIENT
Start: 2020-02-28 | End: 2020-03-18

## 2020-02-18 NOTE — PROGRESS NOTES
HISTORY OF PRESENT ILLNESS  Patient presents with:  Weight Check: lost 7 pounds    Jenna Romano is a 61year old female here for follow up with medical weight loss program for the treatment of overweight, obesity, or morbid obesity.  Patient has lost -# anxiety/depression    EXAM  /82   Pulse 98   Resp 16   Ht 67.5\"   Wt 258 lb (117 kg)   BMI 39.81 kg/m² , Percent body fat: Female 47.3%; Muscle Mass: 11.7%       GENERAL: well developed, well nourished, in no apparent distress, A/O x3  SKIN: no rash Disp: 30 tablet, Rfl: 0  Coenzyme Q10 (CO Q 10 OR), Take by mouth., Disp: , Rfl:   BIOTIN OR, Take by mouth., Disp: , Rfl:   Zinc Sulfate (ZINC 15 OR), Take by mouth., Disp: , Rfl:   metFORMIN HCl 500 MG Oral Tab, Take 1 tablet (500 mg total) by mouth 2 (t medication  · Contradictions: none  · Got message back from Dr. Isael Kat (cardiology- ok to start stimulant)   · Nutrition: Low carb diet, recommended to eat breakfast daily/ regular protein intake  · Follow up with dietitian and psychologist as recommended.

## 2020-02-18 NOTE — PATIENT INSTRUCTIONS
We are here to support you with weight loss, but please remember that you still need your primary care provider for your routine health maintenance.       PLAN:  Will continue with phentermine 37.5mg and metformin 500mg  Follow up with me in 1 month  [place] ** Daily INPUT> Look at nutrition section-- \"nutrients\" and it will break down your macros for the day (ie. Protein, carbs, fibers, sugars and fats). Try to stay within these numbers daily     2.  \"7 minute workout\" to help with exercise/a

## 2020-03-02 RX ORDER — PHENTERMINE HYDROCHLORIDE 37.5 MG/1
TABLET ORAL
Qty: 30 TABLET | Refills: 0 | OUTPATIENT
Start: 2020-03-02

## 2020-03-02 NOTE — TELEPHONE ENCOUNTER
Requesting   Requested Prescriptions     Pending Prescriptions Disp Refills   • Phentermine HCl 37.5 MG Oral Tab [Pharmacy Med Name: Phentermine HCl Oral Tablet 37.5 MG] 30 tablet 0     Sig: TAKE 1/2 TABLET BY MOUTH ONE TIME A DAY FOR 4 DAYS, THEN INCREASE

## 2020-03-18 ENCOUNTER — OFFICE VISIT (OUTPATIENT)
Dept: INTERNAL MEDICINE CLINIC | Facility: CLINIC | Age: 60
End: 2020-03-18
Payer: COMMERCIAL

## 2020-03-18 VITALS
BODY MASS INDEX: 38.78 KG/M2 | DIASTOLIC BLOOD PRESSURE: 80 MMHG | WEIGHT: 250 LBS | HEIGHT: 67.5 IN | TEMPERATURE: 98 F | RESPIRATION RATE: 16 BRPM | SYSTOLIC BLOOD PRESSURE: 120 MMHG | HEART RATE: 86 BPM

## 2020-03-18 DIAGNOSIS — F32.A ANXIETY AND DEPRESSION: ICD-10-CM

## 2020-03-18 DIAGNOSIS — R73.03 PREDIABETES: ICD-10-CM

## 2020-03-18 DIAGNOSIS — I10 HYPERTENSION, BENIGN: ICD-10-CM

## 2020-03-18 DIAGNOSIS — I25.10 CORONARY ARTERY CALCIFICATION SEEN ON CT SCAN: ICD-10-CM

## 2020-03-18 DIAGNOSIS — F41.9 ANXIETY AND DEPRESSION: ICD-10-CM

## 2020-03-18 DIAGNOSIS — E78.00 PURE HYPERCHOLESTEROLEMIA: ICD-10-CM

## 2020-03-18 DIAGNOSIS — Z51.81 THERAPEUTIC DRUG MONITORING: Primary | ICD-10-CM

## 2020-03-18 DIAGNOSIS — E55.9 VITAMIN D DEFICIENCY: ICD-10-CM

## 2020-03-18 DIAGNOSIS — E03.9 HYPOTHYROIDISM (ACQUIRED): ICD-10-CM

## 2020-03-18 PROCEDURE — 99214 OFFICE O/P EST MOD 30 MIN: CPT | Performed by: NURSE PRACTITIONER

## 2020-03-18 RX ORDER — PHENTERMINE HYDROCHLORIDE 37.5 MG/1
37.5 TABLET ORAL
Qty: 30 TABLET | Refills: 1 | Status: SHIPPED | OUTPATIENT
Start: 2020-03-18 | End: 2020-05-15

## 2020-03-18 NOTE — PROGRESS NOTES
HISTORY OF PRESENT ILLNESS  Patient presents with:  Weight Check: lost 8 pounds    Stan Carr is a 61year old female here for follow up with medical weight loss program for the treatment of overweight, obesity, or morbid obesity.  Patient has lost -# depressed    EXAM  /80   Pulse 86   Temp 97.7 °F (36.5 °C)   Resp 16   Ht 67.5\"   Wt 250 lb (113.4 kg)   BMI 38.58 kg/m² , Percent body fat: Female 47.3% ; Muscle Mass: 11.7%       GENERAL: well developed, well nourished, in no apparent distress, A/ , Rfl: Zinc Sulfate (ZINC 15 OR), Take by mouth., Disp: , Rfl:   metFORMIN HCl 500 MG Oral Tab, Take 1 tablet (500 mg total) by mouth 2 (two) times daily with meals. , Disp: 60 tablet, Rfl: 1  Elastic Bandages & Supports (ACE ANKLE BRACE) Does not apply M preDM  · Contradictions: none   · Got message back from Dr. Eve Pagan (cardiology- ok to start stimulant)   · Nutrition: Low carb diet, recommended to eat breakfast daily/ regular protein intake  · Follow up with dietitian and psychologist as recommended.   · D

## 2020-03-18 NOTE — PATIENT INSTRUCTIONS
We are here to support you with weight loss, but please remember that you still need your primary care provider for your routine health maintenance.       PLAN:  Continue with phentermine 37.5mg and metformin   Follow up with me in 2 month  Schedule follow ** Daily INPUT> Look at nutrition section-- \"nutrients\" and it will break down your macros for the day (ie. Protein, carbs, fibers, sugars and fats). Try to stay within these numbers daily     2.  \"7 minute workout\" to help with exercise/a

## 2020-03-19 DIAGNOSIS — R73.03 PREDIABETES: ICD-10-CM

## 2020-03-19 DIAGNOSIS — Z51.81 THERAPEUTIC DRUG MONITORING: ICD-10-CM

## 2020-03-19 DIAGNOSIS — E66.01 CLASS 3 SEVERE OBESITY WITHOUT SERIOUS COMORBIDITY WITH BODY MASS INDEX (BMI) OF 40.0 TO 44.9 IN ADULT, UNSPECIFIED OBESITY TYPE (HCC): ICD-10-CM

## 2020-03-19 NOTE — TELEPHONE ENCOUNTER
Requesting Metformin  LOV: 3/18/20  RTC: 2 months  Last Relevant Labs: 12/13/19  Filled: 1/20/20 #60 with 1 refills    Future Appointments   Date Time Provider Manolo Shelby   4/17/2020 11:40 AM Dony Ritter APRN [facility]

## 2020-04-17 ENCOUNTER — VIRTUAL PHONE E/M (OUTPATIENT)
Dept: INTERNAL MEDICINE CLINIC | Facility: CLINIC | Age: 60
End: 2020-04-17
Payer: COMMERCIAL

## 2020-04-17 DIAGNOSIS — I10 HYPERTENSION, BENIGN: ICD-10-CM

## 2020-04-17 DIAGNOSIS — E66.01 CLASS 3 SEVERE OBESITY WITHOUT SERIOUS COMORBIDITY WITH BODY MASS INDEX (BMI) OF 40.0 TO 44.9 IN ADULT, UNSPECIFIED OBESITY TYPE (HCC): ICD-10-CM

## 2020-04-17 DIAGNOSIS — R73.03 PREDIABETES: ICD-10-CM

## 2020-04-17 DIAGNOSIS — Z51.81 THERAPEUTIC DRUG MONITORING: Primary | ICD-10-CM

## 2020-04-17 DIAGNOSIS — F41.9 ANXIETY AND DEPRESSION: ICD-10-CM

## 2020-04-17 DIAGNOSIS — F32.A ANXIETY AND DEPRESSION: ICD-10-CM

## 2020-04-17 DIAGNOSIS — E55.9 VITAMIN D DEFICIENCY: ICD-10-CM

## 2020-04-17 DIAGNOSIS — E78.00 PURE HYPERCHOLESTEROLEMIA: ICD-10-CM

## 2020-04-17 DIAGNOSIS — E03.9 HYPOTHYROIDISM (ACQUIRED): ICD-10-CM

## 2020-04-17 PROCEDURE — 99213 OFFICE O/P EST LOW 20 MIN: CPT | Performed by: NURSE PRACTITIONER

## 2020-04-17 NOTE — PROGRESS NOTES
Virtual Telephone Check-In    Shelley Venegas verbally consents to a Virtual/Telephone Check-In visit on 4/17/2020. Patient understands and accepts financial responsibility for any deductible, co-insurance and/or co-pays associated with this service. OTC    Prediabetes  prediabetes a1c 6.0% on 12/2019, is currently on metformin 500mg     Pure hypercholesterolemia  Stable, no meds, managed by pcp    Hypertension, benign  Stable, on meds, managed by pcp     Anxiety and depression  Stable, no meds.     Co

## 2020-04-17 NOTE — PATIENT INSTRUCTIONS
We are here to support you with weight loss, but please remember that you still need your primary care provider for your routine health maintenance.       PLAN:  Continue with phentermine 37.5mg and metformin 500mg bid  Follow up with me in 1 month  Schedul ** Daily INPUT> Look at nutrition section-- \"nutrients\" and it will break down your macros for the day (ie. Protein, carbs, fibers, sugars and fats). Try to stay within these numbers daily     2.  \"7 minute workout\" to help with exercise/a

## 2020-05-15 ENCOUNTER — VIRTUAL PHONE E/M (OUTPATIENT)
Dept: INTERNAL MEDICINE CLINIC | Facility: CLINIC | Age: 60
End: 2020-05-15
Payer: COMMERCIAL

## 2020-05-15 DIAGNOSIS — R73.03 PREDIABETES: ICD-10-CM

## 2020-05-15 DIAGNOSIS — I10 HYPERTENSION, BENIGN: ICD-10-CM

## 2020-05-15 DIAGNOSIS — E03.9 HYPOTHYROIDISM (ACQUIRED): ICD-10-CM

## 2020-05-15 DIAGNOSIS — E55.9 VITAMIN D DEFICIENCY: ICD-10-CM

## 2020-05-15 DIAGNOSIS — Z51.81 THERAPEUTIC DRUG MONITORING: Primary | ICD-10-CM

## 2020-05-15 DIAGNOSIS — F32.A ANXIETY AND DEPRESSION: ICD-10-CM

## 2020-05-15 DIAGNOSIS — E78.00 PURE HYPERCHOLESTEROLEMIA: ICD-10-CM

## 2020-05-15 DIAGNOSIS — F41.9 ANXIETY AND DEPRESSION: ICD-10-CM

## 2020-05-15 PROCEDURE — 99213 OFFICE O/P EST LOW 20 MIN: CPT | Performed by: NURSE PRACTITIONER

## 2020-05-15 RX ORDER — PHENTERMINE HYDROCHLORIDE 37.5 MG/1
37.5 TABLET ORAL
Qty: 30 TABLET | Refills: 0 | Status: SHIPPED | OUTPATIENT
Start: 2020-05-15 | End: 2020-06-11

## 2020-05-15 NOTE — PROGRESS NOTES
Virtual Telephone Check-In    Tanner Bunch verbally consents to a Virtual/Telephone Check-In visit on 5/15/2020. Patient understands and accepts financial responsibility for any deductible, co-insurance and/or co-pays associated with this service. denies any mood changes     Physical Exam:  Appropriate affect and mood, normal logic and thought process   Patient speaking in full sentences, no increased work of breathing    Assessment/Plan:   Diagnoses and all orders for this visit:    Therapeutic daphney adequate time. This billing was spent on reviewing labs, medications, radiology tests and decision making. Appropriate medical decision-making and tests are ordered as detailed in the plan of care above.      VIMAL Vee

## 2020-05-15 NOTE — PATIENT INSTRUCTIONS
We are here to support you with weight loss, but please remember that you still need your primary care provider for your routine health maintenance.       PLAN:  Will continue with phentermine 37.5mg and metformin 500mg   Follow up with me in 1 month  Sched ** Daily INPUT> Look at nutrition section-- \"nutrients\" and it will break down your macros for the day (ie. Protein, carbs, fibers, sugars and fats). Try to stay within these numbers daily     2.  \"7 minute workout\" to help with exercise/a

## 2020-05-15 NOTE — PROGRESS NOTES
Virtual Telephone Check-In    Malathi Prado verbally consents to a Virtual/Telephone Check-In visit on 5/15/2020. Patient understands and accepts financial responsibility for any deductible, co-insurance and/or co-pays associated with this service. up recs (Optional):[id number]}  Continue with medications:   · --advised of side effects and adverse effects of this medication  · --advised to check BP and pulse at home-->Reviewed parameters to contact office for any symptoms or abnormal vitals.   · Will start

## 2020-05-19 DIAGNOSIS — F32.A ANXIETY AND DEPRESSION: ICD-10-CM

## 2020-05-19 DIAGNOSIS — F41.9 ANXIETY AND DEPRESSION: ICD-10-CM

## 2020-05-20 RX ORDER — VENLAFAXINE HYDROCHLORIDE 75 MG/1
CAPSULE, EXTENDED RELEASE ORAL
Qty: 90 CAPSULE | Refills: 0 | OUTPATIENT
Start: 2020-05-20

## 2020-06-11 ENCOUNTER — OFFICE VISIT (OUTPATIENT)
Dept: INTERNAL MEDICINE CLINIC | Facility: CLINIC | Age: 60
End: 2020-06-11
Payer: COMMERCIAL

## 2020-06-11 VITALS
TEMPERATURE: 96 F | SYSTOLIC BLOOD PRESSURE: 118 MMHG | RESPIRATION RATE: 16 BRPM | BODY MASS INDEX: 37.39 KG/M2 | WEIGHT: 241 LBS | DIASTOLIC BLOOD PRESSURE: 78 MMHG | HEART RATE: 90 BPM | HEIGHT: 67.5 IN

## 2020-06-11 DIAGNOSIS — Z51.81 THERAPEUTIC DRUG MONITORING: Primary | ICD-10-CM

## 2020-06-11 DIAGNOSIS — E03.9 HYPOTHYROIDISM (ACQUIRED): ICD-10-CM

## 2020-06-11 DIAGNOSIS — E55.9 VITAMIN D DEFICIENCY: ICD-10-CM

## 2020-06-11 DIAGNOSIS — E66.01 CLASS 3 SEVERE OBESITY WITHOUT SERIOUS COMORBIDITY WITH BODY MASS INDEX (BMI) OF 40.0 TO 44.9 IN ADULT, UNSPECIFIED OBESITY TYPE (HCC): ICD-10-CM

## 2020-06-11 DIAGNOSIS — R73.03 PREDIABETES: ICD-10-CM

## 2020-06-11 DIAGNOSIS — F41.9 ANXIETY AND DEPRESSION: ICD-10-CM

## 2020-06-11 DIAGNOSIS — I10 HYPERTENSION, BENIGN: ICD-10-CM

## 2020-06-11 DIAGNOSIS — F32.A ANXIETY AND DEPRESSION: ICD-10-CM

## 2020-06-11 DIAGNOSIS — E78.00 PURE HYPERCHOLESTEROLEMIA: ICD-10-CM

## 2020-06-11 PROCEDURE — 99214 OFFICE O/P EST MOD 30 MIN: CPT | Performed by: NURSE PRACTITIONER

## 2020-06-11 RX ORDER — PHENTERMINE HYDROCHLORIDE 37.5 MG/1
37.5 TABLET ORAL
Qty: 30 TABLET | Refills: 0 | Status: SHIPPED | OUTPATIENT
Start: 2020-06-30 | End: 2020-07-14

## 2020-06-11 NOTE — PROGRESS NOTES
HISTORY OF PRESENT ILLNESS  Patient presents with:  Weight Check: up 1 lb    Socrates Hsieh is a 61year old female here for follow up with medical weight loss program for the treatment of overweight, obesity, or morbid obesity.  Patient has up -# 1 lbs s N/V/D/C  MUSCULOSKELETAL: denies back pain, joint pains  NEURO: denies headaches or dizziness  PSYCH: denies change in behavior or mood, denies feeling sad or depressed    EXAM  /78   Pulse 90   Temp (!) 95.9 °F (35.5 °C) (Temporal)   Resp 16   Ht 67 Oral Tab, TAKE 1 TABLET BY MOUTH TWO TIMES A DAY WITH MEALS, Disp: 180 tablet, Rfl: 0  Levothyroxine Sodium 88 MCG Oral Tab, Take 1 tablet (88 mcg total) by mouth before breakfast., Disp: 90 tablet, Rfl: 1  Venlafaxine HCl ER 75 MG Oral Capsule SR 24 Hr, T X1  · --advised of side effects and adverse effects of this medication  · Continue with medications: metformin 500mg for preDM  · Contradictions: none   · Got message back from Dr. Vega Miles (cardiology- ok to start stimulant)   · Nutrition: Low carb diet, rec

## 2020-06-23 ENCOUNTER — V-VISIT (OUTPATIENT)
Dept: CARDIOLOGY | Age: 60
End: 2020-06-23

## 2020-06-23 VITALS — SYSTOLIC BLOOD PRESSURE: 125 MMHG | DIASTOLIC BLOOD PRESSURE: 90 MMHG | BODY MASS INDEX: 36.34 KG/M2 | WEIGHT: 239 LBS

## 2020-06-23 DIAGNOSIS — R53.83 MALAISE AND FATIGUE: ICD-10-CM

## 2020-06-23 DIAGNOSIS — E78.1 HYPERTRIGLYCERIDEMIA: ICD-10-CM

## 2020-06-23 DIAGNOSIS — I25.10 CORONARY ARTERY CALCIFICATION SEEN ON CT SCAN: ICD-10-CM

## 2020-06-23 DIAGNOSIS — E55.9 VITAMIN D DEFICIENCY: ICD-10-CM

## 2020-06-23 DIAGNOSIS — I10 HYPERTENSION, BENIGN: ICD-10-CM

## 2020-06-23 DIAGNOSIS — E78.00 PURE HYPERCHOLESTEROLEMIA: ICD-10-CM

## 2020-06-23 DIAGNOSIS — R53.81 MALAISE AND FATIGUE: ICD-10-CM

## 2020-06-23 DIAGNOSIS — R06.02 SHORTNESS OF BREATH: Primary | ICD-10-CM

## 2020-06-23 DIAGNOSIS — I25.10 CORONARY ARTERY DISEASE INVOLVING NATIVE CORONARY ARTERY OF NATIVE HEART WITHOUT ANGINA PECTORIS: ICD-10-CM

## 2020-06-23 PROCEDURE — 99214 OFFICE O/P EST MOD 30 MIN: CPT | Performed by: INTERNAL MEDICINE

## 2020-06-23 RX ORDER — PHENTERMINE HYDROCHLORIDE 37.5 MG/1
TABLET ORAL
COMMUNITY
Start: 2020-06-02

## 2020-06-23 RX ORDER — ATORVASTATIN CALCIUM 20 MG/1
20 TABLET, FILM COATED ORAL AT BEDTIME
Qty: 90 TABLET | Refills: 3 | Status: SHIPPED | OUTPATIENT
Start: 2020-06-23 | End: 2021-04-27 | Stop reason: SDUPTHER

## 2020-06-23 ASSESSMENT — ENCOUNTER SYMPTOMS
CHILLS: 0
ALLERGIC/IMMUNOLOGIC COMMENTS: NO NEW FOOD ALLERGIES
SUSPICIOUS LESIONS: 0
WEIGHT GAIN: 0
COUGH: 0
FEVER: 0
BRUISES/BLEEDS EASILY: 0
HEMOPTYSIS: 0
WEIGHT LOSS: 0
HEMATOCHEZIA: 0

## 2020-07-06 DIAGNOSIS — R73.03 PREDIABETES: ICD-10-CM

## 2020-07-06 DIAGNOSIS — Z51.81 THERAPEUTIC DRUG MONITORING: ICD-10-CM

## 2020-07-06 DIAGNOSIS — E66.01 CLASS 3 SEVERE OBESITY WITHOUT SERIOUS COMORBIDITY WITH BODY MASS INDEX (BMI) OF 40.0 TO 44.9 IN ADULT, UNSPECIFIED OBESITY TYPE (HCC): ICD-10-CM

## 2020-07-07 NOTE — TELEPHONE ENCOUNTER
Requesting metformin  LOV: 6/11/20  RTC: one month  Last Relevant Labs: 12/13/19  Filled: 6/11/20 #180 with 0 refills    Future Appointments   Date Time Provider Manolo Shelby   7/14/2020 11:00 AM VIMAL Valencia [facility]     3 luis e

## 2020-07-14 ENCOUNTER — VIRTUAL PHONE E/M (OUTPATIENT)
Dept: INTERNAL MEDICINE CLINIC | Facility: CLINIC | Age: 60
End: 2020-07-14

## 2020-07-14 DIAGNOSIS — I10 HYPERTENSION, BENIGN: ICD-10-CM

## 2020-07-14 DIAGNOSIS — R73.03 PREDIABETES: ICD-10-CM

## 2020-07-14 DIAGNOSIS — E78.00 PURE HYPERCHOLESTEROLEMIA: ICD-10-CM

## 2020-07-14 DIAGNOSIS — E03.9 HYPOTHYROIDISM (ACQUIRED): ICD-10-CM

## 2020-07-14 DIAGNOSIS — Z51.81 THERAPEUTIC DRUG MONITORING: Primary | ICD-10-CM

## 2020-07-14 DIAGNOSIS — E55.9 VITAMIN D DEFICIENCY: ICD-10-CM

## 2020-07-14 PROCEDURE — 99213 OFFICE O/P EST LOW 20 MIN: CPT | Performed by: NURSE PRACTITIONER

## 2020-07-14 RX ORDER — PHENTERMINE HYDROCHLORIDE 37.5 MG/1
37.5 TABLET ORAL
Qty: 30 TABLET | Refills: 1 | Status: SHIPPED | OUTPATIENT
Start: 2020-07-28 | End: 2020-09-03

## 2020-07-14 NOTE — PROGRESS NOTES
Virtual Telephone Check-In    Mandy Kolb verbally consents to a Virtual/Telephone Check-In visit on 7/14/2020. Patient understands and accepts financial responsibility for any deductible, co-insurance and/or co-pays associated with this service. increased work of breathing    Assessment/Plan:   Diagnoses and all orders for this visit:    Therapeutic drug monitoring  BMI 38.0-38.9,adult  See below     Hypothyroidism (acquired)  Stable, on meds, managed by pcp    Prediabetes  prediabetes a1c 6.0% on

## 2020-07-17 ENCOUNTER — TELEPHONE (OUTPATIENT)
Dept: FAMILY MEDICINE CLINIC | Facility: CLINIC | Age: 60
End: 2020-07-17

## 2020-07-29 ENCOUNTER — LAB ENCOUNTER (OUTPATIENT)
Dept: LAB | Age: 60
End: 2020-07-29
Attending: FAMILY MEDICINE
Payer: COMMERCIAL

## 2020-07-29 DIAGNOSIS — E03.9 HYPOTHYROIDISM (ACQUIRED): ICD-10-CM

## 2020-07-29 DIAGNOSIS — E78.00 PURE HYPERCHOLESTEROLEMIA: ICD-10-CM

## 2020-07-29 DIAGNOSIS — E78.1 PURE HYPERGLYCERIDEMIA: ICD-10-CM

## 2020-07-29 DIAGNOSIS — I25.10 CORONARY ATHEROSCLEROSIS OF NATIVE CORONARY ARTERY: Primary | ICD-10-CM

## 2020-07-29 LAB
ALBUMIN SERPL-MCNC: 3.9 G/DL (ref 3.4–5)
ALBUMIN/GLOB SERPL: 0.9 {RATIO} (ref 1–2)
ALP LIVER SERPL-CCNC: 108 U/L (ref 46–118)
ALT SERPL-CCNC: 25 U/L (ref 13–56)
ANION GAP SERPL CALC-SCNC: 2 MMOL/L (ref 0–18)
AST SERPL-CCNC: 18 U/L (ref 15–37)
BILIRUB SERPL-MCNC: 0.6 MG/DL (ref 0.1–2)
BUN BLD-MCNC: 15 MG/DL (ref 7–18)
BUN/CREAT SERPL: 15.8 (ref 10–20)
CALCIUM BLD-MCNC: 9.6 MG/DL (ref 8.5–10.1)
CHLORIDE SERPL-SCNC: 106 MMOL/L (ref 98–112)
CHOLEST SMN-MCNC: 119 MG/DL (ref ?–200)
CO2 SERPL-SCNC: 28 MMOL/L (ref 21–32)
CREAT BLD-MCNC: 0.95 MG/DL (ref 0.55–1.02)
GLOBULIN PLAS-MCNC: 4.3 G/DL (ref 2.8–4.4)
GLUCOSE BLD-MCNC: 121 MG/DL (ref 70–99)
HDLC SERPL-MCNC: 55 MG/DL (ref 40–59)
LDLC SERPL CALC-MCNC: 41 MG/DL (ref ?–100)
M PROTEIN MFR SERPL ELPH: 8.2 G/DL (ref 6.4–8.2)
NONHDLC SERPL-MCNC: 64 MG/DL (ref ?–130)
OSMOLALITY SERPL CALC.SUM OF ELEC: 284 MOSM/KG (ref 275–295)
PATIENT FASTING Y/N/NP: YES
PATIENT FASTING Y/N/NP: YES
POTASSIUM SERPL-SCNC: 3.6 MMOL/L (ref 3.5–5.1)
SODIUM SERPL-SCNC: 136 MMOL/L (ref 136–145)
T3FREE SERPL-MCNC: 2.16 PG/ML (ref 2.4–4.2)
T4 FREE SERPL-MCNC: 1.2 NG/DL (ref 0.8–1.7)
TRIGL SERPL-MCNC: 114 MG/DL (ref 30–149)
TSI SER-ACNC: 2.34 MIU/ML (ref 0.36–3.74)
VLDLC SERPL CALC-MCNC: 23 MG/DL (ref 0–30)

## 2020-07-29 PROCEDURE — 84443 ASSAY THYROID STIM HORMONE: CPT | Performed by: FAMILY MEDICINE

## 2020-07-29 PROCEDURE — 84481 FREE ASSAY (FT-3): CPT | Performed by: FAMILY MEDICINE

## 2020-07-29 PROCEDURE — 84439 ASSAY OF FREE THYROXINE: CPT | Performed by: FAMILY MEDICINE

## 2020-07-30 ENCOUNTER — OFFICE VISIT (OUTPATIENT)
Dept: INTERNAL MEDICINE CLINIC | Facility: CLINIC | Age: 60
End: 2020-07-30
Payer: COMMERCIAL

## 2020-07-30 VITALS
DIASTOLIC BLOOD PRESSURE: 80 MMHG | WEIGHT: 234 LBS | HEART RATE: 86 BPM | SYSTOLIC BLOOD PRESSURE: 122 MMHG | RESPIRATION RATE: 16 BRPM | HEIGHT: 67.5 IN | BODY MASS INDEX: 36.3 KG/M2 | TEMPERATURE: 98 F

## 2020-07-30 DIAGNOSIS — E66.09 CLASS 2 OBESITY DUE TO EXCESS CALORIES WITHOUT SERIOUS COMORBIDITY WITH BODY MASS INDEX (BMI) OF 36.0 TO 36.9 IN ADULT: ICD-10-CM

## 2020-07-30 DIAGNOSIS — Z51.81 THERAPEUTIC DRUG MONITORING: Primary | ICD-10-CM

## 2020-07-30 DIAGNOSIS — R73.03 PREDIABETES: ICD-10-CM

## 2020-07-30 PROCEDURE — 3074F SYST BP LT 130 MM HG: CPT | Performed by: PHYSICIAN ASSISTANT

## 2020-07-30 PROCEDURE — 99213 OFFICE O/P EST LOW 20 MIN: CPT | Performed by: PHYSICIAN ASSISTANT

## 2020-07-30 PROCEDURE — 3079F DIAST BP 80-89 MM HG: CPT | Performed by: PHYSICIAN ASSISTANT

## 2020-07-30 PROCEDURE — 3008F BODY MASS INDEX DOCD: CPT | Performed by: PHYSICIAN ASSISTANT

## 2020-07-30 NOTE — PROGRESS NOTES
HISTORY OF PRESENT ILLNESS  Patient presents with:  Weight Check: down 1 lb      Selma Mims is a 61year old female here for follow up in medical weight loss program.   Pt is on phentermine and metformin  Feels like appetite is suppressed  She had la CREATSERUM 0.95 07/29/2020    ANIONGAP 2 07/29/2020    GFR >59 08/04/2010    GFRNAA 65 07/29/2020    GFRAA 75 07/29/2020    CA 9.6 07/29/2020    OSMOCALC 284 07/29/2020    ALKPHO 108 07/29/2020    AST 18 07/29/2020    ALT 25 07/29/2020    BILT 0.6 07/29/20 facility-administered medications on file prior to visit.        ASSESSMENT  Analyzed weight data:       Diagnoses and all orders for this visit:    Therapeutic drug monitoring    Class 2 obesity due to excess calories without serious comorbidity with body

## 2020-07-31 ENCOUNTER — TELEPHONE (OUTPATIENT)
Dept: FAMILY MEDICINE CLINIC | Facility: CLINIC | Age: 60
End: 2020-07-31

## 2020-08-26 NOTE — PROGRESS NOTES
HPI:   Nick East is a 61year old female here for medication follow up.      Pt has h/o hashimoto's hypothyroid   TSH is now normal     Pt had a high calcium score on her heart scan.  s/p cards eval ; now on asa / statin / co q 10  Sees Dr. Alice Garcia mg/dL  114    LDL Cholesterol Calc      <100 mg/dL  41    VLDL      0 - 30 mg/dL  23    NON HDL CHOL      <130 mg/dL  64    T4,Free (Direct)      0.8 - 1.7 ng/dL   1.2   TSH      0.358 - 3.740 mIU/mL   2.340   HEMOGLOBIN A1c      <5.7 %      ESTIMATED AVER ESTIMATED AVERAGE GLUCOSE      68 - 126 mg/dL  126     T3 FREE      2.40 - 4.20 pg/mL  2.12 (L)  2.30 (L)   Thyroxine (T4)      4.8 - 13.9 ug/dL   10.1    Vitamin B12      193 - 986 pg/mL  680     Vitamin D, 25OH, Total      30.0 - 100.0 ng/mL  38.9 MCG Oral Tab Take 1 tablet (88 mcg total) by mouth before breakfast. 90 tablet 1   • Venlafaxine HCl ER 75 MG Oral Capsule SR 24 Hr TAKE 1 CAPSULE BY MOUTH ONE TIME A DAY 90 capsule 1   • Coenzyme Q10 (CO Q 10 OR) Take by mouth.      • BIOTIN OR Take by luis carlos abdominal pain,denies heartburn  : denies dysuria, vaginal discharge or itching  MUSCULOSKELETAL: denies back pain  NEURO: denies headaches  PSYCHE: denies depression or anxiety  HEMATOLOGIC: denies hx of anemia  ENDOCRINE:  thyroid history  ALL/ASTHMA:

## 2020-09-03 ENCOUNTER — OFFICE VISIT (OUTPATIENT)
Dept: INTERNAL MEDICINE CLINIC | Facility: CLINIC | Age: 60
End: 2020-09-03
Payer: COMMERCIAL

## 2020-09-03 VITALS
HEART RATE: 102 BPM | TEMPERATURE: 97 F | RESPIRATION RATE: 16 BRPM | BODY MASS INDEX: 35.99 KG/M2 | WEIGHT: 232 LBS | DIASTOLIC BLOOD PRESSURE: 82 MMHG | SYSTOLIC BLOOD PRESSURE: 132 MMHG | HEIGHT: 67.5 IN

## 2020-09-03 DIAGNOSIS — I25.10 CORONARY ARTERY CALCIFICATION SEEN ON CT SCAN: ICD-10-CM

## 2020-09-03 DIAGNOSIS — F32.A ANXIETY AND DEPRESSION: ICD-10-CM

## 2020-09-03 DIAGNOSIS — F41.9 ANXIETY AND DEPRESSION: ICD-10-CM

## 2020-09-03 DIAGNOSIS — Z51.81 THERAPEUTIC DRUG MONITORING: Primary | ICD-10-CM

## 2020-09-03 DIAGNOSIS — E66.01 CLASS 3 SEVERE OBESITY WITHOUT SERIOUS COMORBIDITY WITH BODY MASS INDEX (BMI) OF 40.0 TO 44.9 IN ADULT, UNSPECIFIED OBESITY TYPE (HCC): ICD-10-CM

## 2020-09-03 DIAGNOSIS — E03.9 HYPOTHYROIDISM (ACQUIRED): ICD-10-CM

## 2020-09-03 DIAGNOSIS — R73.03 PREDIABETES: ICD-10-CM

## 2020-09-03 PROCEDURE — 3079F DIAST BP 80-89 MM HG: CPT | Performed by: PHYSICIAN ASSISTANT

## 2020-09-03 PROCEDURE — 3008F BODY MASS INDEX DOCD: CPT | Performed by: PHYSICIAN ASSISTANT

## 2020-09-03 PROCEDURE — 3075F SYST BP GE 130 - 139MM HG: CPT | Performed by: PHYSICIAN ASSISTANT

## 2020-09-03 PROCEDURE — 99213 OFFICE O/P EST LOW 20 MIN: CPT | Performed by: PHYSICIAN ASSISTANT

## 2020-09-03 RX ORDER — PHENTERMINE HYDROCHLORIDE 37.5 MG/1
37.5 TABLET ORAL
Qty: 30 TABLET | Refills: 0 | Status: SHIPPED | OUTPATIENT
Start: 2020-09-03 | End: 2020-10-02

## 2020-09-03 NOTE — PROGRESS NOTES
HISTORY OF PRESENT ILLNESS  Patient presents with:  Weight Check: lost 2 pounds      Sinai Arnold is a 61year old female here for follow up in medical weight loss program.   Down 2lbs  Compliant on metformin and phentermine  Pt's mood is good, doing w 07/29/2020    ANIONGAP 2 07/29/2020    GFR >59 08/04/2010    GFRNAA 65 07/29/2020    GFRAA 75 07/29/2020    CA 9.6 07/29/2020    OSMOCALC 284 07/29/2020    ALKPHO 108 07/29/2020    AST 18 07/29/2020    ALT 25 07/29/2020    BILT 0.6 07/29/2020    TP 8.2 07/ mg total) by mouth 2 (two) times daily with meals. Prediabetes  -     metFORMIN HCl 500 MG Oral Tab; Take 1 tablet (500 mg total) by mouth 2 (two) times daily with meals.     Coronary artery calcification seen on CT scan    Hypothyroidism (acquired)    A CLARISA  9/3/2020

## 2020-09-11 DIAGNOSIS — R73.03 PREDIABETES: ICD-10-CM

## 2020-09-11 DIAGNOSIS — Z51.81 THERAPEUTIC DRUG MONITORING: ICD-10-CM

## 2020-09-11 DIAGNOSIS — E66.01 CLASS 3 SEVERE OBESITY WITHOUT SERIOUS COMORBIDITY WITH BODY MASS INDEX (BMI) OF 40.0 TO 44.9 IN ADULT, UNSPECIFIED OBESITY TYPE (HCC): ICD-10-CM

## 2020-09-13 NOTE — TELEPHONE ENCOUNTER
Requesting Metformin 500 mg  LOV: 9/3/20  RTC: one month  Last Relevant Labs: 12/13/19  Filled: 9/3/20 #180 with 0 refills    Future Appointments   Date Time Provider Manolo Shelby   10/2/2020 10:20 AM CLARISA Faria [facility]   2/

## 2020-09-24 ENCOUNTER — TELEPHONE (OUTPATIENT)
Dept: INTERNAL MEDICINE CLINIC | Facility: CLINIC | Age: 60
End: 2020-09-24

## 2020-09-24 NOTE — TELEPHONE ENCOUNTER
Clemencia Frye [facility] received medical records request from paramed      Sent to [address] STAT

## 2020-10-02 ENCOUNTER — OFFICE VISIT (OUTPATIENT)
Dept: INTERNAL MEDICINE CLINIC | Facility: CLINIC | Age: 60
End: 2020-10-02
Payer: COMMERCIAL

## 2020-10-02 VITALS
WEIGHT: 227 LBS | BODY MASS INDEX: 35.21 KG/M2 | HEART RATE: 92 BPM | SYSTOLIC BLOOD PRESSURE: 124 MMHG | RESPIRATION RATE: 14 BRPM | DIASTOLIC BLOOD PRESSURE: 90 MMHG | TEMPERATURE: 95 F | HEIGHT: 67.5 IN

## 2020-10-02 DIAGNOSIS — F41.9 ANXIETY AND DEPRESSION: ICD-10-CM

## 2020-10-02 DIAGNOSIS — F32.A ANXIETY AND DEPRESSION: ICD-10-CM

## 2020-10-02 DIAGNOSIS — G47.00 INSOMNIA, UNSPECIFIED TYPE: ICD-10-CM

## 2020-10-02 DIAGNOSIS — Z51.81 THERAPEUTIC DRUG MONITORING: Primary | ICD-10-CM

## 2020-10-02 DIAGNOSIS — E66.09 CLASS 2 OBESITY DUE TO EXCESS CALORIES WITHOUT SERIOUS COMORBIDITY WITH BODY MASS INDEX (BMI) OF 35.0 TO 35.9 IN ADULT: ICD-10-CM

## 2020-10-02 DIAGNOSIS — Z23 NEED FOR VACCINATION: ICD-10-CM

## 2020-10-02 DIAGNOSIS — I10 HYPERTENSION, BENIGN: ICD-10-CM

## 2020-10-02 PROCEDURE — 99214 OFFICE O/P EST MOD 30 MIN: CPT | Performed by: PHYSICIAN ASSISTANT

## 2020-10-02 PROCEDURE — 3008F BODY MASS INDEX DOCD: CPT | Performed by: PHYSICIAN ASSISTANT

## 2020-10-02 PROCEDURE — 90686 IIV4 VACC NO PRSV 0.5 ML IM: CPT | Performed by: PHYSICIAN ASSISTANT

## 2020-10-02 PROCEDURE — 90471 IMMUNIZATION ADMIN: CPT | Performed by: PHYSICIAN ASSISTANT

## 2020-10-02 PROCEDURE — 3074F SYST BP LT 130 MM HG: CPT | Performed by: PHYSICIAN ASSISTANT

## 2020-10-02 PROCEDURE — 3080F DIAST BP >= 90 MM HG: CPT | Performed by: PHYSICIAN ASSISTANT

## 2020-10-02 RX ORDER — PHENTERMINE HYDROCHLORIDE 37.5 MG/1
37.5 TABLET ORAL
Qty: 30 TABLET | Refills: 0 | Status: SHIPPED | OUTPATIENT
Start: 2020-10-02 | End: 2020-10-30

## 2020-10-02 NOTE — PROGRESS NOTES
HISTORY OF PRESENT ILLNESS  Patient presents with:  Weight Check: down [address] W Dr. Logan Quintanilla is a 61year old female here for follow up in medical weight loss program.   Down 5lbs  Compliant on phentermine and metformin  Has been active this month her so 05/26/2011     Lab Results   Component Value Date     (H) 07/29/2020    BUN 15 07/29/2020    BUNCREA 15.8 07/29/2020    CREATSERUM 0.95 07/29/2020    ANIONGAP 2 07/29/2020    GFR >59 08/04/2010    GFRNAA 65 07/29/2020    GFRAA 75 07/29/2020    CA 9. drug monitoring    Class 2 obesity due to excess calories without serious comorbidity with body mass index (BMI) of 35.0 to 35.9 in adult    Anxiety and depression    Hypertension, benign    Insomnia, unspecified type    Need for vaccination  -     FLULAVA

## 2020-10-30 ENCOUNTER — OFFICE VISIT (OUTPATIENT)
Dept: INTERNAL MEDICINE CLINIC | Facility: CLINIC | Age: 60
End: 2020-10-30
Payer: COMMERCIAL

## 2020-10-30 VITALS
HEIGHT: 67.5 IN | DIASTOLIC BLOOD PRESSURE: 80 MMHG | BODY MASS INDEX: 34.59 KG/M2 | SYSTOLIC BLOOD PRESSURE: 110 MMHG | HEART RATE: 80 BPM | RESPIRATION RATE: 14 BRPM | WEIGHT: 223 LBS

## 2020-10-30 DIAGNOSIS — I10 HYPERTENSION, BENIGN: ICD-10-CM

## 2020-10-30 DIAGNOSIS — E66.09 CLASS 2 OBESITY DUE TO EXCESS CALORIES WITHOUT SERIOUS COMORBIDITY WITH BODY MASS INDEX (BMI) OF 35.0 TO 35.9 IN ADULT: ICD-10-CM

## 2020-10-30 DIAGNOSIS — Z51.81 THERAPEUTIC DRUG MONITORING: Primary | ICD-10-CM

## 2020-10-30 DIAGNOSIS — E55.9 VITAMIN D DEFICIENCY: ICD-10-CM

## 2020-10-30 DIAGNOSIS — R73.03 PREDIABETES: ICD-10-CM

## 2020-10-30 PROCEDURE — 3079F DIAST BP 80-89 MM HG: CPT | Performed by: NURSE PRACTITIONER

## 2020-10-30 PROCEDURE — 3074F SYST BP LT 130 MM HG: CPT | Performed by: NURSE PRACTITIONER

## 2020-10-30 PROCEDURE — 3008F BODY MASS INDEX DOCD: CPT | Performed by: NURSE PRACTITIONER

## 2020-10-30 PROCEDURE — 99214 OFFICE O/P EST MOD 30 MIN: CPT | Performed by: NURSE PRACTITIONER

## 2020-10-30 RX ORDER — PHENTERMINE HYDROCHLORIDE 37.5 MG/1
37.5 TABLET ORAL
Qty: 30 TABLET | Refills: 1 | Status: SHIPPED | OUTPATIENT
Start: 2020-10-30 | End: 2020-12-29

## 2020-10-30 NOTE — PATIENT INSTRUCTIONS
We are here to support you with weight loss, but please remember that you still need your primary care provider for your routine health maintenance.       PLAN:  Continue with phentermine 37.5mg and metformin 500mg  Follow up with me in 1 month  Schedule fo ** Daily INPUT> Look at nutrition section-- \"nutrients\" and it will break down your macros for the day (ie. Protein, carbs, fibers, sugars and fats). Try to stay within these numbers daily     2.  \"7 minute workout\" to help with exercise/a

## 2020-10-30 NOTE — PROGRESS NOTES
HISTORY OF PRESENT ILLNESS  Patient presents with:  Weight Check: down 4     Birdie Kauffman is a 61year old female here for follow up with medical weight loss program for the treatment of overweight, obesity, or morbid obesity.  Patient lost -# 4 lbs sin dizziness  PSYCH: denies change in behavior or mood, denies feeling sad or depressed    EXAM  /80   Pulse 80   Resp 14   Ht 67.5\"   Wt 223 lb (101.2 kg)   BMI 34.41 kg/m² , Percent body fat: Female 45.2% ; Muscle Mass: 12.8%       GENERAL: well deve HCl ER 75 MG Oral Capsule SR 24 Hr, TAKE 1 CAPSULE BY MOUTH ONE TIME A DAY, Disp: 90 capsule, Rfl: 1    •  Coenzyme Q10 (CO Q 10 OR), Take by mouth., Disp: , Rfl:     •  BIOTIN OR, Take by mouth., Disp: , Rfl:     •  Zinc Sulfate (ZINC 15 OR), Take by mout barriers to successful weight loss and weight maintenance  · FITTE: ACSM recommendations (150-300 minutes/ week in active weight loss)   · Weight Loss Consent to treat reviewed and signed. There are no Patient Instructions on file for this visit.     No

## 2020-11-29 DIAGNOSIS — Z51.81 THERAPEUTIC DRUG MONITORING: ICD-10-CM

## 2020-11-29 DIAGNOSIS — R73.03 PREDIABETES: ICD-10-CM

## 2020-11-29 DIAGNOSIS — E66.01 CLASS 3 SEVERE OBESITY WITHOUT SERIOUS COMORBIDITY WITH BODY MASS INDEX (BMI) OF 40.0 TO 44.9 IN ADULT, UNSPECIFIED OBESITY TYPE (HCC): ICD-10-CM

## 2020-11-30 NOTE — TELEPHONE ENCOUNTER
Requesting   Requested Prescriptions     Pending Prescriptions Disp Refills   • METFORMIN  MG Oral Tab [Pharmacy Med Name: metFORMIN HCl Oral Tablet 500 MG] 180 tablet 0     Sig: TAKE 1 TABLET BY MOUTH TWO TIMES A DAY WITH MEALS     LOV: 10/30/20  R

## 2020-12-01 ENCOUNTER — TELEMEDICINE (OUTPATIENT)
Dept: INTERNAL MEDICINE CLINIC | Facility: CLINIC | Age: 60
End: 2020-12-01
Payer: COMMERCIAL

## 2020-12-01 VITALS — BODY MASS INDEX: 34 KG/M2 | WEIGHT: 218 LBS

## 2020-12-01 DIAGNOSIS — I10 HYPERTENSION, BENIGN: ICD-10-CM

## 2020-12-01 DIAGNOSIS — E03.9 HYPOTHYROIDISM (ACQUIRED): ICD-10-CM

## 2020-12-01 DIAGNOSIS — E66.09 CLASS 2 OBESITY DUE TO EXCESS CALORIES WITHOUT SERIOUS COMORBIDITY WITH BODY MASS INDEX (BMI) OF 35.0 TO 35.9 IN ADULT: ICD-10-CM

## 2020-12-01 DIAGNOSIS — E55.9 VITAMIN D DEFICIENCY: ICD-10-CM

## 2020-12-01 DIAGNOSIS — F32.A ANXIETY AND DEPRESSION: ICD-10-CM

## 2020-12-01 DIAGNOSIS — F41.9 ANXIETY AND DEPRESSION: ICD-10-CM

## 2020-12-01 DIAGNOSIS — Z51.81 THERAPEUTIC DRUG MONITORING: Primary | ICD-10-CM

## 2020-12-01 DIAGNOSIS — R73.03 PREDIABETES: ICD-10-CM

## 2020-12-01 PROCEDURE — 99213 OFFICE O/P EST LOW 20 MIN: CPT | Performed by: NURSE PRACTITIONER

## 2020-12-01 NOTE — PATIENT INSTRUCTIONS
We are here to support you with weight loss, but please remember that you still need your primary care provider for your routine health maintenance.       PLAN:  Continue with phentermine and metformin   Follow up with me in 1 month  Schedule follow up appo ** Daily INPUT> Look at nutrition section-- \"nutrients\" and it will break down your macros for the day (ie. Protein, carbs, fibers, sugars and fats). Try to stay within these numbers daily     2.  \"7 minute workout\" to help with exercise/a

## 2020-12-01 NOTE — PROGRESS NOTES
[facility] WEIGHT MANAGEMENT VIRTUAL ENCOUNTER     Wade Fernandez verbally consents to a Virtual/Telephone Check-In service on 12/1/20   Patient understands and accepts financial responsibility for any deductible, co-insurance and/or co-pays associa work, no exertion  EXTREMITIES: no cyanosis, no clubbing, no edema  NEURO: Oriented times three  PSYCH: pleasant, cooperative, normal mood and affect    Lab Results   Component Value Date    WBC 8.7 12/13/2019    RBC 5.23 12/13/2019    HGB 15.4 12/13/2019 Coenzyme Q10 (CO Q 10 OR), Take by mouth., Disp: , Rfl:     •  BIOTIN OR, Take by mouth., Disp: , Rfl:     •  Zinc Sulfate (ZINC 15 OR), Take by mouth., Disp: , Rfl:     •  hydrochlorothiazide 12.5 MG Oral Tab, , Disp: , Rfl:     •  BABY ASPIRIN OR, , Dis file.    Patient verbalizes understanding. Total face to face time was 14 minutes, more than 50% of the time was spent in counseling and/or coordination of care.   Pt understands phone/video evaluation is not a substitute for face to face examination or

## 2020-12-29 ENCOUNTER — OFFICE VISIT (OUTPATIENT)
Dept: INTERNAL MEDICINE CLINIC | Facility: CLINIC | Age: 60
End: 2020-12-29
Payer: COMMERCIAL

## 2020-12-29 VITALS
SYSTOLIC BLOOD PRESSURE: 108 MMHG | HEART RATE: 87 BPM | RESPIRATION RATE: 16 BRPM | BODY MASS INDEX: 34.13 KG/M2 | DIASTOLIC BLOOD PRESSURE: 70 MMHG | OXYGEN SATURATION: 98 % | HEIGHT: 67.5 IN | WEIGHT: 220 LBS

## 2020-12-29 DIAGNOSIS — I10 HYPERTENSION, BENIGN: ICD-10-CM

## 2020-12-29 DIAGNOSIS — Z51.81 THERAPEUTIC DRUG MONITORING: Primary | ICD-10-CM

## 2020-12-29 DIAGNOSIS — E55.9 VITAMIN D DEFICIENCY: ICD-10-CM

## 2020-12-29 DIAGNOSIS — E66.9 CLASS 1 OBESITY WITHOUT SERIOUS COMORBIDITY WITH BODY MASS INDEX (BMI) OF 33.0 TO 33.9 IN ADULT, UNSPECIFIED OBESITY TYPE: ICD-10-CM

## 2020-12-29 DIAGNOSIS — E03.9 HYPOTHYROIDISM (ACQUIRED): ICD-10-CM

## 2020-12-29 DIAGNOSIS — R73.03 PREDIABETES: ICD-10-CM

## 2020-12-29 DIAGNOSIS — E78.00 PURE HYPERCHOLESTEROLEMIA: ICD-10-CM

## 2020-12-29 PROCEDURE — 3008F BODY MASS INDEX DOCD: CPT | Performed by: NURSE PRACTITIONER

## 2020-12-29 PROCEDURE — 3074F SYST BP LT 130 MM HG: CPT | Performed by: NURSE PRACTITIONER

## 2020-12-29 PROCEDURE — 3078F DIAST BP <80 MM HG: CPT | Performed by: NURSE PRACTITIONER

## 2020-12-29 PROCEDURE — 99214 OFFICE O/P EST MOD 30 MIN: CPT | Performed by: NURSE PRACTITIONER

## 2020-12-29 RX ORDER — PHENTERMINE HYDROCHLORIDE 37.5 MG/1
37.5 TABLET ORAL
Qty: 30 TABLET | Refills: 1 | Status: SHIPPED | OUTPATIENT
Start: 2020-12-29 | End: 2021-03-19

## 2020-12-29 NOTE — PROGRESS NOTES
HISTORY OF PRESENT ILLNESS  Patient presents with: Follow - Up: Gained 2    Sina Amezquita is a 61year old female here for follow up with medical weight loss program for the treatment of overweight, obesity, or morbid obesity.      Up #2 lbs  Compliant suspicious lesions  HEENT: conjunctiva pink, sclera non icteric, PERRLA  NECK: supple, no adenopathy  LUNGS: CTA in all fields, breathing non labored  CARDIO: RRR without murmur  GI: +BS, NT/ND, no masses or HSM  EXTREMITIES: no cyanosis, no clubbing, no e nightly., Disp: , Rfl:     No current facility-administered medications on file prior to visit.        ASSESSMENT/PLAN  (Z51.81) Therapeutic drug monitoring  (primary encounter diagnosis)  Plan: Phentermine HCl 37.5 MG Oral Tab    (E66.9,  Z68.33) Class 1 o weight maintenance  · FITTE: ACSM recommendations (150-300 minutes/ week in active weight loss)   · Weight Loss Consent to treat reviewed and signed. There are no Patient Instructions on file for this visit. No follow-ups on file.     Patient Taco Downey

## 2020-12-29 NOTE — PATIENT INSTRUCTIONS
We are here to support you with weight loss, but please remember that you still need your primary care provider for your routine health maintenance.       PLAN:  Will continue with phentermine and metformin   Follow up with me in 1 month  Schedule follow up ** Daily INPUT> Look at nutrition section-- \"nutrients\" and it will break down your macros for the day (ie. Protein, carbs, fibers, sugars and fats). Try to stay within these numbers daily     2.  \"7 minute workout\" to help with exercise/a

## 2021-01-02 ENCOUNTER — HOSPITAL ENCOUNTER (OUTPATIENT)
Dept: MAMMOGRAPHY | Age: 61
Discharge: HOME | End: 2021-01-02
Attending: FAMILY MEDICINE
Payer: COMMERCIAL

## 2021-01-02 DIAGNOSIS — Z12.31 ENCOUNTER FOR SCREENING MAMMOGRAM FOR HIGH-RISK PATIENT: ICD-10-CM

## 2021-01-02 PROCEDURE — 77067 SCR MAMMO BI INCL CAD: CPT | Performed by: FAMILY MEDICINE

## 2021-01-02 PROCEDURE — 77063 BREAST TOMOSYNTHESIS BI: CPT | Performed by: FAMILY MEDICINE

## 2021-02-07 NOTE — PROGRESS NOTES
HPI:   Selma Mims is a 61year old female who presents for a complete physical exam.     Wt Readings from Last 6 Encounters:  02/08/21 : 219 lb (99.3 kg)  12/29/20 : 220 lb (99.8 kg)  12/01/20 : 218 lb (98.9 kg)  10/30/20 : 223 lb (101.2 kg)  10/02/2 Oral Tab Take 1 tablet (37.5 mg total) by mouth every morning before breakfast. 30 tablet 1   • METFORMIN  MG Oral Tab TAKE 1 TABLET BY MOUTH TWO TIMES A DAY WITH MEALS 180 tablet 0   • Levothyroxine Sodium 88 MCG Oral Tab Take 1 tablet (88 mcg tota pain on exertion  GI: denies abdominal pain,denies heartburn  : denies dysuria, vaginal discharge or itchin   MUSCULOSKELETAL: denies back pain  NEURO: denies headaches  PSYCHE: denies depression or anxiety  HEMATOLOGIC: denies hx of anemia  ENDOCRINE: Future  - HEMOGLOBIN A1C; Future  - THINPREP PAP SMEAR B; Future  - HPV HIGH RISK , THIN PREP COLLECTION;  Future  - THINPREP PAP SMEAR B  - HPV HIGH RISK , THIN PREP COLLECTION  - THINPREP PAP SMEAR ONLY    3. Encounter for screening mammogram for high-ris • Coenzyme Q10 (CO Q 10 OR) Take by mouth. • BIOTIN OR Take by mouth. • Zinc Sulfate (ZINC 15 OR) Take by mouth. • hydrochlorothiazide 12.5 MG Oral Tab      • BABY ASPIRIN OR      • atorvastatin 20 MG Oral Tab Take 20 mg by mouth nightly. lb (99.3 kg)   SpO2 98%   BMI 33.79 kg/m²   Body mass index is 33.79 kg/m².    GENERAL: alert and oriented X 3, well developed, well nourished,in no apparent distress  CARDIO: RRR without murmur  LUNGS: clear to auscultation  NECK: supple,no adenopathy  RAVI

## 2021-02-08 ENCOUNTER — OFFICE VISIT (OUTPATIENT)
Dept: FAMILY MEDICINE CLINIC | Facility: CLINIC | Age: 61
End: 2021-02-08
Payer: COMMERCIAL

## 2021-02-08 VITALS
BODY MASS INDEX: 33.97 KG/M2 | HEART RATE: 90 BPM | HEIGHT: 67.5 IN | RESPIRATION RATE: 16 BRPM | OXYGEN SATURATION: 98 % | WEIGHT: 219 LBS | SYSTOLIC BLOOD PRESSURE: 124 MMHG | DIASTOLIC BLOOD PRESSURE: 86 MMHG

## 2021-02-08 DIAGNOSIS — Z13.820 SCREENING FOR OSTEOPOROSIS: ICD-10-CM

## 2021-02-08 DIAGNOSIS — Z01.419 WELL WOMAN EXAM WITH ROUTINE GYNECOLOGICAL EXAM: Primary | ICD-10-CM

## 2021-02-08 DIAGNOSIS — Z00.00 ANNUAL PHYSICAL EXAM: ICD-10-CM

## 2021-02-08 DIAGNOSIS — Z12.31 ENCOUNTER FOR SCREENING MAMMOGRAM FOR HIGH-RISK PATIENT: ICD-10-CM

## 2021-02-08 DIAGNOSIS — F41.9 ANXIETY AND DEPRESSION: ICD-10-CM

## 2021-02-08 DIAGNOSIS — F32.A ANXIETY AND DEPRESSION: ICD-10-CM

## 2021-02-08 DIAGNOSIS — E03.9 HYPOTHYROIDISM (ACQUIRED): ICD-10-CM

## 2021-02-08 PROCEDURE — 87624 HPV HI-RISK TYP POOLED RSLT: CPT | Performed by: FAMILY MEDICINE

## 2021-02-08 PROCEDURE — 3008F BODY MASS INDEX DOCD: CPT | Performed by: FAMILY MEDICINE

## 2021-02-08 PROCEDURE — 3074F SYST BP LT 130 MM HG: CPT | Performed by: FAMILY MEDICINE

## 2021-02-08 PROCEDURE — 3079F DIAST BP 80-89 MM HG: CPT | Performed by: FAMILY MEDICINE

## 2021-02-08 PROCEDURE — 99213 OFFICE O/P EST LOW 20 MIN: CPT | Performed by: FAMILY MEDICINE

## 2021-02-08 PROCEDURE — 88175 CYTOPATH C/V AUTO FLUID REDO: CPT | Performed by: FAMILY MEDICINE

## 2021-02-08 PROCEDURE — 99396 PREV VISIT EST AGE 40-64: CPT | Performed by: FAMILY MEDICINE

## 2021-02-08 RX ORDER — LEVOTHYROXINE SODIUM 88 UG/1
88 TABLET ORAL
Qty: 90 TABLET | Refills: 1 | Status: SHIPPED | OUTPATIENT
Start: 2021-02-08 | End: 2021-10-20

## 2021-02-08 RX ORDER — VENLAFAXINE HYDROCHLORIDE 75 MG/1
CAPSULE, EXTENDED RELEASE ORAL
Qty: 90 CAPSULE | Refills: 1 | Status: SHIPPED | OUTPATIENT
Start: 2021-02-08 | End: 2021-08-16

## 2021-02-09 LAB — HPV I/H RISK 1 DNA SPEC QL NAA+PROBE: NEGATIVE

## 2021-02-10 DIAGNOSIS — Z51.81 THERAPEUTIC DRUG MONITORING: ICD-10-CM

## 2021-02-10 RX ORDER — PHENTERMINE HYDROCHLORIDE 37.5 MG/1
37.5 TABLET ORAL
Qty: 30 TABLET | Refills: 1 | Status: CANCELLED | OUTPATIENT
Start: 2021-02-10

## 2021-02-10 NOTE — TELEPHONE ENCOUNTER
Requesting Phentermine  LOV: 12/29/20  RTC: one month  Last Relevant Labs: na  Filled: 12/29/20 #30 with 1 refills    Future Appointments   Date Time Provider Manolo Shelby   2/12/2021 10:00 AM VIMAL De Anda [facility]     Refill was given.   My chart sent to patient

## 2021-02-12 ENCOUNTER — TELEMEDICINE (OUTPATIENT)
Dept: INTERNAL MEDICINE CLINIC | Facility: CLINIC | Age: 61
End: 2021-02-12
Payer: COMMERCIAL

## 2021-02-12 VITALS — BODY MASS INDEX: 34 KG/M2 | WEIGHT: 218 LBS

## 2021-02-12 DIAGNOSIS — E66.01 CLASS 3 SEVERE OBESITY WITHOUT SERIOUS COMORBIDITY WITH BODY MASS INDEX (BMI) OF 40.0 TO 44.9 IN ADULT, UNSPECIFIED OBESITY TYPE (HCC): ICD-10-CM

## 2021-02-12 DIAGNOSIS — I10 HYPERTENSION, BENIGN: ICD-10-CM

## 2021-02-12 DIAGNOSIS — F41.9 ANXIETY AND DEPRESSION: ICD-10-CM

## 2021-02-12 DIAGNOSIS — E66.9 CLASS 1 OBESITY WITHOUT SERIOUS COMORBIDITY WITH BODY MASS INDEX (BMI) OF 33.0 TO 33.9 IN ADULT, UNSPECIFIED OBESITY TYPE: ICD-10-CM

## 2021-02-12 DIAGNOSIS — E55.9 VITAMIN D DEFICIENCY: ICD-10-CM

## 2021-02-12 DIAGNOSIS — Z51.81 THERAPEUTIC DRUG MONITORING: Primary | ICD-10-CM

## 2021-02-12 DIAGNOSIS — F32.A ANXIETY AND DEPRESSION: ICD-10-CM

## 2021-02-12 DIAGNOSIS — R73.03 PREDIABETES: ICD-10-CM

## 2021-02-12 DIAGNOSIS — E03.9 HYPOTHYROIDISM (ACQUIRED): ICD-10-CM

## 2021-02-12 DIAGNOSIS — E78.00 PURE HYPERCHOLESTEROLEMIA: ICD-10-CM

## 2021-02-12 PROCEDURE — 99214 OFFICE O/P EST MOD 30 MIN: CPT | Performed by: NURSE PRACTITIONER

## 2021-02-12 NOTE — PATIENT INSTRUCTIONS
We are here to support you with weight loss, but please remember that you still need your primary care provider for your routine health maintenance.       PLAN:  Decrease phentermine 15mg, continue with metformin   Try intermittent fasting 16:8 (dr. Shalonda Woods) Activity level: not very active (can't count exercise towards calorie number per day)                   ** Daily INPUT> Look at nutrition section-- \"nutrients\" and it will break down your macros for the day (ie.  Protein, carbs, fibers, suga

## 2021-02-12 NOTE — PROGRESS NOTES
[facility] WEIGHT MANAGEMENT VIRTUAL ENCOUNTER     Mandy Kolb verbally consents to a Virtual/Telephone Check-In service on 02/12/21   Patient understands and accepts financial responsibility for any deductible, co-insurance and/or co-pays associ rashes to exposed area   EYES: conjunctiva pink  HEENT: atraumatic, normocephalic  LUNGS: normal work of breathing, non labored  CARDIO: normal work, no exertion  EXTREMITIES: no cyanosis, no clubbing, no edema  NEURO: Oriented times three  PSYCH: pleasant breakfast., Disp: 30 tablet, Rfl: 1    •  METFORMIN  MG Oral Tab, TAKE 1 TABLET BY MOUTH TWO TIMES A DAY WITH MEALS, Disp: 180 tablet, Rfl: 0    •  Coenzyme Q10 (CO Q 10 OR), Take by mouth., Disp: , Rfl:     •  BIOTIN OR, Take by mouth., Disp: , Rfl dietitian and psychologist as recommended. · Discussed the role of sleep and stress in weight management. · Counseled on comprehensive weight loss plan including attention to nutrition, exercise and behavior/stress management for success.  See patient ins

## 2021-02-23 DIAGNOSIS — Z51.81 THERAPEUTIC DRUG MONITORING: ICD-10-CM

## 2021-02-23 DIAGNOSIS — E66.01 CLASS 3 SEVERE OBESITY WITHOUT SERIOUS COMORBIDITY WITH BODY MASS INDEX (BMI) OF 40.0 TO 44.9 IN ADULT, UNSPECIFIED OBESITY TYPE (HCC): ICD-10-CM

## 2021-02-23 DIAGNOSIS — R73.03 PREDIABETES: ICD-10-CM

## 2021-02-23 NOTE — TELEPHONE ENCOUNTER
Requesting:  Metformin 500 mg  LOV: 2/12/21  RTC: one month  Last Relevant Labs: 12/13/19  Filled: 2/12/21 #180 with 0 refills    Future Appointments   Date Time Provider Manolo Shelby   3/19/2021 10:00 AM Ksenia Vega APRN EMGWEI [facility]

## 2021-03-19 ENCOUNTER — TELEMEDICINE (OUTPATIENT)
Dept: INTERNAL MEDICINE CLINIC | Facility: CLINIC | Age: 61
End: 2021-03-19
Payer: COMMERCIAL

## 2021-03-19 VITALS — BODY MASS INDEX: 32 KG/M2 | WEIGHT: 207 LBS

## 2021-03-19 DIAGNOSIS — E55.9 VITAMIN D DEFICIENCY: ICD-10-CM

## 2021-03-19 DIAGNOSIS — E66.9 CLASS 1 OBESITY WITHOUT SERIOUS COMORBIDITY WITH BODY MASS INDEX (BMI) OF 33.0 TO 33.9 IN ADULT, UNSPECIFIED OBESITY TYPE: ICD-10-CM

## 2021-03-19 DIAGNOSIS — Z51.81 THERAPEUTIC DRUG MONITORING: Primary | ICD-10-CM

## 2021-03-19 DIAGNOSIS — F41.9 ANXIETY AND DEPRESSION: ICD-10-CM

## 2021-03-19 DIAGNOSIS — E78.00 PURE HYPERCHOLESTEROLEMIA: ICD-10-CM

## 2021-03-19 DIAGNOSIS — F32.A ANXIETY AND DEPRESSION: ICD-10-CM

## 2021-03-19 DIAGNOSIS — E66.01 CLASS 3 SEVERE OBESITY WITHOUT SERIOUS COMORBIDITY WITH BODY MASS INDEX (BMI) OF 40.0 TO 44.9 IN ADULT, UNSPECIFIED OBESITY TYPE (HCC): ICD-10-CM

## 2021-03-19 DIAGNOSIS — I10 HYPERTENSION, BENIGN: ICD-10-CM

## 2021-03-19 DIAGNOSIS — R73.03 PREDIABETES: ICD-10-CM

## 2021-03-19 DIAGNOSIS — E03.9 HYPOTHYROIDISM (ACQUIRED): ICD-10-CM

## 2021-03-19 PROCEDURE — 99214 OFFICE O/P EST MOD 30 MIN: CPT | Performed by: NURSE PRACTITIONER

## 2021-03-19 RX ORDER — PHENTERMINE HYDROCHLORIDE 37.5 MG/1
37.5 TABLET ORAL
Qty: 30 TABLET | Refills: 1 | Status: SHIPPED | OUTPATIENT
Start: 2021-03-19 | End: 2021-08-16

## 2021-03-19 NOTE — PATIENT INSTRUCTIONS
We are here to support you with weight loss, but please remember that you still need your primary care provider for your routine health maintenance.       PLAN:  Will continue with phentermine 15mg and metformin   Go to the lab for blood work- from PCP   Fo calorie number per day)                   ** Daily INPUT> Look at nutrition section-- \"nutrients\" and it will break down your macros for the day (ie. Protein, carbs, fibers, sugars and fats). Try to stay within these numbers daily     2.  \"7 minute worko

## 2021-03-19 NOTE — PROGRESS NOTES
[facility] WEIGHT MANAGEMENT VIRTUAL ENCOUNTER     Birdie Kauffman verbally consents to a Virtual/Telephone Check-In service on 03/19/21   Patient understands and accepts financial responsibility for any deductible, co-insurance and/or co-pays associ dry without rashes to exposed area   EYES: conjunctiva pink  HEENT: atraumatic, normocephalic  LUNGS: normal work of breathing, non labored  CARDIO: normal work, no exertion  EXTREMITIES: no cyanosis, no clubbing, no edema  NEURO: Oriented times three  PSY Disp: 90 tablet, Rfl: 1  Phentermine HCl 37.5 MG Oral Tab, Take 1 tablet (37.5 mg total) by mouth every morning before breakfast., Disp: 30 tablet, Rfl: 1  Coenzyme Q10 (CO Q 10 OR), Take by mouth., Disp: , Rfl:   BIOTIN OR, Take by mouth., Disp: , Rfl: including attention to nutrition, exercise and behavior/stress management for success. See patient instruction below for more details.   · Discussed strategies to overcome barriers to successful weight loss and weight maintenance  · FITTE: ACSM recommendati

## 2021-03-23 ENCOUNTER — HOSPITAL ENCOUNTER (OUTPATIENT)
Dept: BONE DENSITY | Age: 61
Discharge: HOME | End: 2021-03-23
Attending: FAMILY MEDICINE
Payer: COMMERCIAL

## 2021-03-23 DIAGNOSIS — Z13.820 SCREENING FOR OSTEOPOROSIS: ICD-10-CM

## 2021-03-23 PROCEDURE — 77080 DXA BONE DENSITY AXIAL: CPT | Performed by: FAMILY MEDICINE

## 2021-03-24 ENCOUNTER — E-ADVICE (OUTPATIENT)
Dept: CARDIOLOGY | Age: 61
End: 2021-03-24

## 2021-03-27 ENCOUNTER — IMMUNIZATION (OUTPATIENT)
Dept: LAB | Age: 61
End: 2021-03-27

## 2021-03-27 DIAGNOSIS — Z23 NEED FOR VACCINATION: Primary | ICD-10-CM

## 2021-03-27 PROCEDURE — 0001A COVID 19 PFIZER-BIONTECH: CPT

## 2021-03-27 PROCEDURE — 91300 COVID 19 PFIZER-BIONTECH: CPT

## 2021-03-28 ENCOUNTER — E-ADVICE (OUTPATIENT)
Dept: CARDIOLOGY | Age: 61
End: 2021-03-28

## 2021-04-12 ENCOUNTER — LAB ENCOUNTER (OUTPATIENT)
Dept: LAB | Age: 61
End: 2021-04-12
Attending: FAMILY MEDICINE
Payer: COMMERCIAL

## 2021-04-12 DIAGNOSIS — Z00.00 ANNUAL PHYSICAL EXAM: ICD-10-CM

## 2021-04-12 PROCEDURE — 82607 VITAMIN B-12: CPT | Performed by: FAMILY MEDICINE

## 2021-04-12 PROCEDURE — 36415 COLL VENOUS BLD VENIPUNCTURE: CPT | Performed by: FAMILY MEDICINE

## 2021-04-12 PROCEDURE — 82306 VITAMIN D 25 HYDROXY: CPT | Performed by: FAMILY MEDICINE

## 2021-04-12 PROCEDURE — 80061 LIPID PANEL: CPT | Performed by: FAMILY MEDICINE

## 2021-04-12 PROCEDURE — 80050 GENERAL HEALTH PANEL: CPT | Performed by: FAMILY MEDICINE

## 2021-04-12 PROCEDURE — 83036 HEMOGLOBIN GLYCOSYLATED A1C: CPT | Performed by: FAMILY MEDICINE

## 2021-04-17 ENCOUNTER — IMMUNIZATION (OUTPATIENT)
Dept: LAB | Age: 61
End: 2021-04-17
Attending: HOSPITALIST

## 2021-04-17 DIAGNOSIS — Z23 NEED FOR VACCINATION: Primary | ICD-10-CM

## 2021-04-17 PROCEDURE — 91300 COVID 19 PFIZER-BIONTECH: CPT | Performed by: HOSPITALIST

## 2021-04-17 PROCEDURE — 0002A COVID 19 PFIZER-BIONTECH: CPT | Performed by: HOSPITALIST

## 2021-04-27 ENCOUNTER — OFFICE VISIT (OUTPATIENT)
Dept: CARDIOLOGY | Age: 61
End: 2021-04-27

## 2021-04-27 VITALS
BODY MASS INDEX: 31.98 KG/M2 | DIASTOLIC BLOOD PRESSURE: 78 MMHG | HEIGHT: 68 IN | WEIGHT: 211 LBS | HEART RATE: 87 BPM | SYSTOLIC BLOOD PRESSURE: 116 MMHG

## 2021-04-27 DIAGNOSIS — I25.10 CORONARY ARTERY CALCIFICATION SEEN ON CT SCAN: ICD-10-CM

## 2021-04-27 DIAGNOSIS — I27.20 PULMONARY HYPERTENSION (CMD): ICD-10-CM

## 2021-04-27 DIAGNOSIS — I25.10 CORONARY ARTERY DISEASE INVOLVING NATIVE CORONARY ARTERY OF NATIVE HEART WITHOUT ANGINA PECTORIS: ICD-10-CM

## 2021-04-27 DIAGNOSIS — E55.9 VITAMIN D DEFICIENCY: ICD-10-CM

## 2021-04-27 DIAGNOSIS — I10 HYPERTENSION, BENIGN: ICD-10-CM

## 2021-04-27 DIAGNOSIS — E78.1 HYPERTRIGLYCERIDEMIA: ICD-10-CM

## 2021-04-27 DIAGNOSIS — R53.81 MALAISE AND FATIGUE: Primary | ICD-10-CM

## 2021-04-27 DIAGNOSIS — R06.02 SHORTNESS OF BREATH: ICD-10-CM

## 2021-04-27 DIAGNOSIS — R53.83 MALAISE AND FATIGUE: Primary | ICD-10-CM

## 2021-04-27 DIAGNOSIS — E78.00 PURE HYPERCHOLESTEROLEMIA: ICD-10-CM

## 2021-04-27 PROCEDURE — 3078F DIAST BP <80 MM HG: CPT | Performed by: INTERNAL MEDICINE

## 2021-04-27 PROCEDURE — 3074F SYST BP LT 130 MM HG: CPT | Performed by: INTERNAL MEDICINE

## 2021-04-27 PROCEDURE — 99214 OFFICE O/P EST MOD 30 MIN: CPT | Performed by: INTERNAL MEDICINE

## 2021-04-27 RX ORDER — ATORVASTATIN CALCIUM 20 MG/1
20 TABLET, FILM COATED ORAL AT BEDTIME
Qty: 90 TABLET | Refills: 4 | Status: SHIPPED | OUTPATIENT
Start: 2021-04-27

## 2021-04-27 RX ORDER — PHENTERMINE HYDROCHLORIDE 37.5 MG/1
37.5 TABLET ORAL
Qty: 30 TABLET | Refills: 1 | Status: CANCELLED | OUTPATIENT
Start: 2021-04-27

## 2021-04-27 RX ORDER — HYDROCHLOROTHIAZIDE 12.5 MG/1
12.5 TABLET ORAL DAILY
Qty: 90 TABLET | Refills: 3 | Status: SHIPPED | OUTPATIENT
Start: 2021-04-27

## 2021-04-28 ENCOUNTER — OFFICE VISIT (OUTPATIENT)
Dept: INTERNAL MEDICINE CLINIC | Facility: CLINIC | Age: 61
End: 2021-04-28
Payer: COMMERCIAL

## 2021-04-28 VITALS
WEIGHT: 211 LBS | SYSTOLIC BLOOD PRESSURE: 120 MMHG | HEIGHT: 67.5 IN | RESPIRATION RATE: 16 BRPM | BODY MASS INDEX: 32.73 KG/M2 | DIASTOLIC BLOOD PRESSURE: 74 MMHG | HEART RATE: 74 BPM

## 2021-04-28 DIAGNOSIS — E03.9 HYPOTHYROIDISM (ACQUIRED): ICD-10-CM

## 2021-04-28 DIAGNOSIS — E66.9 CLASS 1 OBESITY WITHOUT SERIOUS COMORBIDITY WITH BODY MASS INDEX (BMI) OF 33.0 TO 33.9 IN ADULT, UNSPECIFIED OBESITY TYPE: ICD-10-CM

## 2021-04-28 DIAGNOSIS — I10 HYPERTENSION, BENIGN: ICD-10-CM

## 2021-04-28 DIAGNOSIS — E55.9 VITAMIN D DEFICIENCY: ICD-10-CM

## 2021-04-28 DIAGNOSIS — E78.00 PURE HYPERCHOLESTEROLEMIA: ICD-10-CM

## 2021-04-28 DIAGNOSIS — R73.03 PREDIABETES: ICD-10-CM

## 2021-04-28 DIAGNOSIS — F41.9 ANXIETY AND DEPRESSION: ICD-10-CM

## 2021-04-28 DIAGNOSIS — F32.A ANXIETY AND DEPRESSION: ICD-10-CM

## 2021-04-28 DIAGNOSIS — Z51.81 THERAPEUTIC DRUG MONITORING: Primary | ICD-10-CM

## 2021-04-28 PROCEDURE — 3074F SYST BP LT 130 MM HG: CPT | Performed by: NURSE PRACTITIONER

## 2021-04-28 PROCEDURE — 3008F BODY MASS INDEX DOCD: CPT | Performed by: NURSE PRACTITIONER

## 2021-04-28 PROCEDURE — 99214 OFFICE O/P EST MOD 30 MIN: CPT | Performed by: NURSE PRACTITIONER

## 2021-04-28 PROCEDURE — 3078F DIAST BP <80 MM HG: CPT | Performed by: NURSE PRACTITIONER

## 2021-04-28 NOTE — PATIENT INSTRUCTIONS
We are here to support you with weight loss, but please remember that you still need your primary care provider for your routine health maintenance.       PLAN:  Will continue with phentermine 15mg and increase metformin 1,000mg bid   Follow up with me in 1 day)                   ** Daily INPUT> Look at nutrition section-- \"nutrients\" and it will break down your macros for the day (ie. Protein, carbs, fibers, sugars and fats). Try to stay within these numbers daily     2.  \"7 minute workout\" to help with e

## 2021-04-28 NOTE — PROGRESS NOTES
HISTORY OF PRESENT ILLNESS  Patient presents with:  Weight Check: up 4 lbs     Stan Carr is a 64year old female here for follow up with medical weight loss program for the treatment of overweight, obesity, or morbid obesity.      Up #4 lbs  Complian depressed    EXAM  /74   Pulse 74   Resp 16   Ht 5' 7.5\" (1.715 m)   Wt 211 lb (95.7 kg)   BMI 32.56 kg/m²       GENERAL: well developed, well nourished, in no apparent distress, A/O x3  SKIN: no rashes, no suspicious lesions  HEENT: conjunctiva pin breakfast., Disp: 90 tablet, Rfl: 1  Coenzyme Q10 (CO Q 10 OR), Take by mouth., Disp: , Rfl:   BIOTIN OR, Take by mouth., Disp: , Rfl:   hydrochlorothiazide 12.5 MG Oral Tab, , Disp: , Rfl:   BABY ASPIRIN OR, , Disp: , Rfl:   atorvastatin 20 MG Oral Tab, T current medication regimen, managed by PCP   · Anxiety/depression- stable, no meds   · Nutrition: Low carb diet, recommended to eat breakfast daily/ regular protein intake  · Follow up with dietitian and psychologist as recommended.   · Discussed the role o

## 2021-05-10 DIAGNOSIS — R73.03 PREDIABETES: ICD-10-CM

## 2021-05-10 DIAGNOSIS — Z51.81 THERAPEUTIC DRUG MONITORING: ICD-10-CM

## 2021-05-10 DIAGNOSIS — E66.01 CLASS 3 SEVERE OBESITY WITHOUT SERIOUS COMORBIDITY WITH BODY MASS INDEX (BMI) OF 40.0 TO 44.9 IN ADULT, UNSPECIFIED OBESITY TYPE (HCC): ICD-10-CM

## 2021-05-10 NOTE — TELEPHONE ENCOUNTER
Requesting Metformin 500 mg  LOV: 4/28/21  RTC: one month  Last Relevant Labs: 4/12/21  Filled: 2/12/21 #180 with 0 refills    6/9/2021 10:40 AM Dawne Opitz, Teddy Paris, VIMAL EMGAMANDA [facility]     Patient was increased to 1000 mg on 4/28/21 - denied this requ

## 2021-05-26 ENCOUNTER — HOSPITAL ENCOUNTER (OUTPATIENT)
Dept: CV DIAGNOSTICS | Facility: HOSPITAL | Age: 61
Discharge: HOME | End: 2021-05-26
Attending: INTERNAL MEDICINE
Payer: COMMERCIAL

## 2021-05-26 DIAGNOSIS — I25.10 CORONARY ARTERY DISEASE INVOLVING NATIVE CORONARY ARTERY OF NATIVE HEART WITHOUT ANGINA PECTORIS: ICD-10-CM

## 2021-05-26 DIAGNOSIS — I10 BENIGN HYPERTENSION: ICD-10-CM

## 2021-05-26 DIAGNOSIS — I25.10 CORONARY ARTERY CALCIFICATION SEEN ON CT SCAN: ICD-10-CM

## 2021-05-26 DIAGNOSIS — I27.20 PULMONARY HYPERTENSION (HCC): ICD-10-CM

## 2021-05-26 PROCEDURE — 93306 TTE W/DOPPLER COMPLETE: CPT | Performed by: INTERNAL MEDICINE

## 2021-05-28 ENCOUNTER — E-ADVICE (OUTPATIENT)
Dept: CARDIOLOGY | Age: 61
End: 2021-05-28

## 2021-06-02 RX ORDER — PHENTERMINE HYDROCHLORIDE 37.5 MG/1
37.5 TABLET ORAL
Qty: 30 TABLET | Refills: 1 | Status: CANCELLED | OUTPATIENT
Start: 2021-06-02

## 2021-06-09 ENCOUNTER — OFFICE VISIT (OUTPATIENT)
Dept: INTERNAL MEDICINE CLINIC | Facility: CLINIC | Age: 61
End: 2021-06-09
Payer: COMMERCIAL

## 2021-06-09 VITALS
HEART RATE: 78 BPM | BODY MASS INDEX: 32.57 KG/M2 | DIASTOLIC BLOOD PRESSURE: 72 MMHG | RESPIRATION RATE: 20 BRPM | WEIGHT: 210 LBS | SYSTOLIC BLOOD PRESSURE: 120 MMHG | HEIGHT: 67.5 IN

## 2021-06-09 DIAGNOSIS — F41.9 ANXIETY AND DEPRESSION: ICD-10-CM

## 2021-06-09 DIAGNOSIS — I10 HYPERTENSION, BENIGN: ICD-10-CM

## 2021-06-09 DIAGNOSIS — R73.03 PREDIABETES: ICD-10-CM

## 2021-06-09 DIAGNOSIS — E66.9 CLASS 1 OBESITY WITHOUT SERIOUS COMORBIDITY WITH BODY MASS INDEX (BMI) OF 33.0 TO 33.9 IN ADULT, UNSPECIFIED OBESITY TYPE: ICD-10-CM

## 2021-06-09 DIAGNOSIS — E78.00 PURE HYPERCHOLESTEROLEMIA: ICD-10-CM

## 2021-06-09 DIAGNOSIS — F32.A ANXIETY AND DEPRESSION: ICD-10-CM

## 2021-06-09 DIAGNOSIS — Z51.81 THERAPEUTIC DRUG MONITORING: Primary | ICD-10-CM

## 2021-06-09 DIAGNOSIS — E55.9 VITAMIN D DEFICIENCY: ICD-10-CM

## 2021-06-09 DIAGNOSIS — E03.9 HYPOTHYROIDISM (ACQUIRED): ICD-10-CM

## 2021-06-09 PROCEDURE — 99214 OFFICE O/P EST MOD 30 MIN: CPT | Performed by: NURSE PRACTITIONER

## 2021-06-09 PROCEDURE — 3074F SYST BP LT 130 MM HG: CPT | Performed by: NURSE PRACTITIONER

## 2021-06-09 PROCEDURE — 3078F DIAST BP <80 MM HG: CPT | Performed by: NURSE PRACTITIONER

## 2021-06-09 PROCEDURE — 3008F BODY MASS INDEX DOCD: CPT | Performed by: NURSE PRACTITIONER

## 2021-06-09 NOTE — PROGRESS NOTES
HISTORY OF PRESENT ILLNESS  Patient presents with:  Weight Check: down 1 lb    Mandy Kolb is a 64year old female here for follow up with medical weight loss program for the treatment of overweight, obesity, or morbid obesity.      Down 1 lbs  Complia cyanosis, no clubbing, no edema  PSYCH: pleasant, cooperative, normal mood and affect    Lab Results   Component Value Date     (H) 04/12/2021    BUN 21 (H) 04/12/2021    BUNCREA 25.6 (H) 04/12/2021    CREATSERUM 0.82 04/12/2021    ANIONGAP 4 04/12/ Rfl:     No current facility-administered medications on file prior to visit.       ASSESSMENT/PLAN  (Z51.81) Therapeutic drug monitoring  (primary encounter diagnosis)  Plan: Phentermine HCl 37.5 MG Oral Tab    (E66.9,  Z68.33) Class 1 obesity without seri strategies to overcome barriers to successful weight loss and weight maintenance  · FITTE: ACSM recommendations (150-300 minutes/ week in active weight loss)   · Weight Loss Consent to treat reviewed and signed.     There are no Patient Instructions on file

## 2021-06-10 NOTE — PATIENT INSTRUCTIONS
We are here to support you with weight loss, but please remember that you still need your primary care provider for your routine health maintenance.       PLAN:  Will continue with phentermine and metformin   Follow up with me in 1-2 month  Schedule follow ** Daily INPUT> Look at nutrition section-- \"nutrients\" and it will break down your macros for the day (ie. Protein, carbs, fibers, sugars and fats). Try to stay within these numbers daily     2.  \"7 minute workout\" to help with exercise/activity which

## 2021-06-16 ENCOUNTER — E-ADVICE (OUTPATIENT)
Dept: CARDIOLOGY | Age: 61
End: 2021-06-16

## 2021-07-15 ENCOUNTER — OFFICE VISIT (OUTPATIENT)
Dept: INTERNAL MEDICINE CLINIC | Facility: CLINIC | Age: 61
End: 2021-07-15
Payer: COMMERCIAL

## 2021-07-15 VITALS
HEIGHT: 67.5 IN | SYSTOLIC BLOOD PRESSURE: 124 MMHG | OXYGEN SATURATION: 98 % | DIASTOLIC BLOOD PRESSURE: 78 MMHG | WEIGHT: 207 LBS | BODY MASS INDEX: 32.11 KG/M2 | RESPIRATION RATE: 18 BRPM | HEART RATE: 83 BPM

## 2021-07-15 DIAGNOSIS — F41.9 ANXIETY AND DEPRESSION: ICD-10-CM

## 2021-07-15 DIAGNOSIS — Z51.81 THERAPEUTIC DRUG MONITORING: Primary | ICD-10-CM

## 2021-07-15 DIAGNOSIS — R73.03 PREDIABETES: ICD-10-CM

## 2021-07-15 DIAGNOSIS — E03.9 HYPOTHYROIDISM (ACQUIRED): ICD-10-CM

## 2021-07-15 DIAGNOSIS — E78.00 PURE HYPERCHOLESTEROLEMIA: ICD-10-CM

## 2021-07-15 DIAGNOSIS — F32.A ANXIETY AND DEPRESSION: ICD-10-CM

## 2021-07-15 DIAGNOSIS — E55.9 VITAMIN D DEFICIENCY: ICD-10-CM

## 2021-07-15 DIAGNOSIS — E66.9 CLASS 1 OBESITY WITHOUT SERIOUS COMORBIDITY WITH BODY MASS INDEX (BMI) OF 33.0 TO 33.9 IN ADULT, UNSPECIFIED OBESITY TYPE: ICD-10-CM

## 2021-07-15 PROCEDURE — 3074F SYST BP LT 130 MM HG: CPT | Performed by: NURSE PRACTITIONER

## 2021-07-15 PROCEDURE — 99214 OFFICE O/P EST MOD 30 MIN: CPT | Performed by: NURSE PRACTITIONER

## 2021-07-15 PROCEDURE — 3008F BODY MASS INDEX DOCD: CPT | Performed by: NURSE PRACTITIONER

## 2021-07-15 PROCEDURE — 3078F DIAST BP <80 MM HG: CPT | Performed by: NURSE PRACTITIONER

## 2021-07-15 RX ORDER — PHENTERMINE HYDROCHLORIDE 37.5 MG/1
37.5 TABLET ORAL
Qty: 30 TABLET | Refills: 1 | Status: CANCELLED | OUTPATIENT
Start: 2021-07-15

## 2021-07-15 RX ORDER — SEMAGLUTIDE 0.25 MG/.5ML
0.25 INJECTION, SOLUTION SUBCUTANEOUS WEEKLY
Qty: 4 EACH | Refills: 0 | Status: SHIPPED | OUTPATIENT
Start: 2021-07-15 | End: 2021-08-16

## 2021-07-15 NOTE — PROGRESS NOTES
HISTORY OF PRESENT ILLNESS  Patient presents with:  Weight Check: down 3    Nick East is a 64year old female here for follow up with medical weight loss program for the treatment of overweight, obesity, or morbid obesity.      Down 3 lbs  Compliant pink, sclera non icteric, PERRLA  NECK: supple, no adenopathy  LUNGS: CTA in all fields, breathing non labored  CARDIO: RRR without murmur  GI: +BS, NT/ND, no masses or HSM  EXTREMITIES: no cyanosis, no clubbing, no edema  PSYCH: pleasant, cooperative, nor hydrochlorothiazide 12.5 MG Oral Tab, Take 12.5 mg by mouth daily. , Disp: , Rfl:   BABY ASPIRIN OR, daily.   , Disp: , Rfl:   atorvastatin 20 MG Oral Tab, Take 20 mg by mouth nightly., Disp: , Rfl:     No current facility-administered medications on olesya recommended. · Discussed the role of sleep and stress in weight management. · Counseled on comprehensive weight loss plan including attention to nutrition, exercise and behavior/stress management for success.  See patient instruction below for more detail

## 2021-07-15 NOTE — PATIENT INSTRUCTIONS
We are here to support you with weight loss, but please remember that you still need your primary care provider for your routine health maintenance.       PLAN:  Will stop phentermine, will start wegovy 0.25mg weekly X 4 weeks and then increase to 0.5mg wee active (can't count exercise towards calorie number per day)                   ** Daily INPUT> Look at nutrition section-- \"nutrients\" and it will break down your macros for the day (ie. Protein, carbs, fibers, sugars and fats).  Try to stay within these

## 2021-07-26 DIAGNOSIS — Z51.81 THERAPEUTIC DRUG MONITORING: ICD-10-CM

## 2021-07-26 DIAGNOSIS — E66.9 CLASS 1 OBESITY WITHOUT SERIOUS COMORBIDITY WITH BODY MASS INDEX (BMI) OF 33.0 TO 33.9 IN ADULT, UNSPECIFIED OBESITY TYPE: ICD-10-CM

## 2021-07-26 DIAGNOSIS — R73.03 PREDIABETES: ICD-10-CM

## 2021-07-27 NOTE — TELEPHONE ENCOUNTER
Requesting Metformin 1000 mg  LOV: 7/15/21  RTC: 7 weeks  Last Relevant Labs: 4/12/21  Filled: 4/28/21 #180 with 0 refills    Future Appointments   Date Time Provider Manolo Shelby   9/1/2021 10:40 AM VIMAL Velazquez [facility]   1/4/

## 2021-08-16 DIAGNOSIS — F41.9 ANXIETY AND DEPRESSION: ICD-10-CM

## 2021-08-16 DIAGNOSIS — F32.A ANXIETY AND DEPRESSION: ICD-10-CM

## 2021-08-16 RX ORDER — VENLAFAXINE HYDROCHLORIDE 75 MG/1
CAPSULE, EXTENDED RELEASE ORAL
Qty: 30 CAPSULE | Refills: 0 | Status: SHIPPED | OUTPATIENT
Start: 2021-08-16 | End: 2021-09-20

## 2021-08-18 ENCOUNTER — HOSPITAL ENCOUNTER (OUTPATIENT)
Age: 61
Discharge: HOME | End: 2021-08-18
Attending: EMERGENCY MEDICINE
Payer: COMMERCIAL

## 2021-08-18 VITALS
HEART RATE: 78 BPM | DIASTOLIC BLOOD PRESSURE: 70 MMHG | OXYGEN SATURATION: 97 % | BODY MASS INDEX: 31.02 KG/M2 | TEMPERATURE: 97 F | SYSTOLIC BLOOD PRESSURE: 117 MMHG | WEIGHT: 200 LBS | RESPIRATION RATE: 16 BRPM | HEIGHT: 67.5 IN

## 2021-08-18 DIAGNOSIS — M54.9 MODERATE BACK PAIN: Primary | ICD-10-CM

## 2021-08-18 PROCEDURE — 99213 OFFICE O/P EST LOW 20 MIN: CPT

## 2021-08-18 RX ORDER — CYCLOBENZAPRINE HCL 10 MG
10 TABLET ORAL 3 TIMES DAILY PRN
Qty: 20 TABLET | Refills: 0 | Status: SHIPPED | OUTPATIENT
Start: 2021-08-18 | End: 2021-08-25

## 2021-08-18 NOTE — ED PROVIDER NOTES
Patient Seen in: [facility]      History   Patient presents with:  Back Pain    Stated Complaint: BACK PAIN X 2 WEEKS    HPI/Subjective:   HPI    59-year-old female complaint of back pain the patient states she has had back pain for about (36.3 °C) (Temporal)   Resp 16   Ht 171.5 cm (5' 7.5\")   Wt 90.7 kg   SpO2 97%   BMI 30.86 kg/m²         Physical Exam    Patient is alert orient x3 no acute distress the back there are some mild tenderness on the left lumbar paraspinal region there is so

## 2021-08-24 ENCOUNTER — PATIENT MESSAGE (OUTPATIENT)
Dept: INTERNAL MEDICINE CLINIC | Facility: CLINIC | Age: 61
End: 2021-08-24

## 2021-08-25 NOTE — TELEPHONE ENCOUNTER
From: Misha Auguste  To: VIMAL Marks  Sent: 8/24/2021 3:51 PM CDT  Subject: Prescription Question    Hi Seb Pemberton has told me that they have no idea when they will receive the next dose of Wegovy.  They suggested that I hol

## 2021-09-01 ENCOUNTER — OFFICE VISIT (OUTPATIENT)
Dept: INTERNAL MEDICINE CLINIC | Facility: CLINIC | Age: 61
End: 2021-09-01
Payer: COMMERCIAL

## 2021-09-01 VITALS
BODY MASS INDEX: 31.8 KG/M2 | SYSTOLIC BLOOD PRESSURE: 120 MMHG | HEART RATE: 88 BPM | DIASTOLIC BLOOD PRESSURE: 78 MMHG | HEIGHT: 67.5 IN | WEIGHT: 205 LBS | OXYGEN SATURATION: 99 %

## 2021-09-01 DIAGNOSIS — E78.00 PURE HYPERCHOLESTEROLEMIA: ICD-10-CM

## 2021-09-01 DIAGNOSIS — I10 HYPERTENSION, BENIGN: ICD-10-CM

## 2021-09-01 DIAGNOSIS — R73.03 PREDIABETES: ICD-10-CM

## 2021-09-01 DIAGNOSIS — Z51.81 THERAPEUTIC DRUG MONITORING: Primary | ICD-10-CM

## 2021-09-01 DIAGNOSIS — E55.9 VITAMIN D DEFICIENCY: ICD-10-CM

## 2021-09-01 DIAGNOSIS — E03.9 HYPOTHYROIDISM (ACQUIRED): ICD-10-CM

## 2021-09-01 DIAGNOSIS — E66.9 OBESITY WITH BODY MASS INDEX (BMI) OF 30.0 TO 39.9: ICD-10-CM

## 2021-09-01 DIAGNOSIS — F32.A ANXIETY AND DEPRESSION: ICD-10-CM

## 2021-09-01 DIAGNOSIS — F41.9 ANXIETY AND DEPRESSION: ICD-10-CM

## 2021-09-01 PROCEDURE — 99214 OFFICE O/P EST MOD 30 MIN: CPT | Performed by: NURSE PRACTITIONER

## 2021-09-01 PROCEDURE — 3078F DIAST BP <80 MM HG: CPT | Performed by: NURSE PRACTITIONER

## 2021-09-01 PROCEDURE — 3008F BODY MASS INDEX DOCD: CPT | Performed by: NURSE PRACTITIONER

## 2021-09-01 PROCEDURE — 3074F SYST BP LT 130 MM HG: CPT | Performed by: NURSE PRACTITIONER

## 2021-09-01 NOTE — PATIENT INSTRUCTIONS
We are here to support you with weight loss, but please remember that you still need your primary care provider for your routine health maintenance.       PLAN:  Will stop wegovy (until you run out) and will trial ozempic 1mg weekly   Follow up with me in 4 per day)                   ** Daily INPUT> Look at nutrition section-- \"nutrients\" and it will break down your macros for the day (ie. Protein, carbs, fibers, sugars and fats). Try to stay within these numbers daily     2.  \"7 minute workout\" to help wi

## 2021-09-01 NOTE — PROGRESS NOTES
HISTORY OF PRESENT ILLNESS  Patient presents with:  Weight Check: down 2    Daria Parr is a 64year old female here for follow up with medical weight loss program for the treatment of overweight, obesity, or morbid obesity.      Down 2 lbs  Compliant in all fields, breathing non labored  CARDIO: RRR without murmur  GI: +BS, NT/ND, no masses or HSM  EXTREMITIES: no cyanosis, no clubbing, no edema  PSYCH: pleasant, cooperative, normal mood and affect    Lab Results   Component Value Date     (H) 0 for 4 doses. (Patient not taking: Reported on 9/1/2021 ), Disp: 2 mL, Rfl: 0    No current facility-administered medications on file prior to visit.       ASSESSMENT/PLAN  (Z51.81) Therapeutic drug monitoring  (primary encounter diagnosis)    (E66.9) Obesit weight maintenance  · FITTE: ACSM recommendations (150-300 minutes/ week in active weight loss)   · Weight Loss Consent to treat reviewed and signed.     Total time spent on chart review, pre-charting, obtaining history, counseling, and educating, reviewing

## 2021-09-19 DIAGNOSIS — F41.9 ANXIETY AND DEPRESSION: ICD-10-CM

## 2021-09-19 DIAGNOSIS — F32.A ANXIETY AND DEPRESSION: ICD-10-CM

## 2021-09-20 RX ORDER — VENLAFAXINE HYDROCHLORIDE 75 MG/1
CAPSULE, EXTENDED RELEASE ORAL
Qty: 30 CAPSULE | Refills: 0 | Status: SHIPPED | OUTPATIENT
Start: 2021-09-20 | End: 2021-10-01

## 2021-09-20 NOTE — TELEPHONE ENCOUNTER
Next Appt:    With [facility] Lili Payment, DO)  10/01/2021 at 9:30 AM    LV 2-8-21    LR  8-16-21

## 2021-10-01 PROBLEM — F41.9 ANXIETY AND DEPRESSION: Status: RESOLVED | Noted: 2018-04-19 | Resolved: 2021-10-01

## 2021-10-01 PROBLEM — F32.A ANXIETY AND DEPRESSION: Status: RESOLVED | Noted: 2018-04-19 | Resolved: 2021-10-01

## 2021-10-01 NOTE — PROGRESS NOTES
HPI:   Sina Amezquita is a 64year old female here for medication follow up.      Pt has h/o hashimoto's hypothyroid   TSH is now normal     Pt had a high calcium score on her heart scan.  s/p cards eval ; now on asa / statin / co q 10  Sees Dr. Duke Haskins • Hypertension Maternal Grandfather    • Heart Attack Maternal Grandfather    • Colon Cancer Brother    • Heart Attack Paternal Uncle       Social History:   Social History    Tobacco Use      Smoking status: Former Smoker      Smokeless tobacco: Never U Refill: 1    2. Pure hypercholesterolemia    - LIPID PANEL; Future        Questions answered and patient indicates understanding of these issues and agrees to the plan. Follow up in 6 mo or sooner if needed.

## 2021-10-13 ENCOUNTER — OFFICE VISIT (OUTPATIENT)
Dept: INTERNAL MEDICINE CLINIC | Facility: CLINIC | Age: 61
End: 2021-10-13
Payer: COMMERCIAL

## 2021-10-13 VITALS
SYSTOLIC BLOOD PRESSURE: 120 MMHG | WEIGHT: 206 LBS | HEIGHT: 67.5 IN | DIASTOLIC BLOOD PRESSURE: 76 MMHG | BODY MASS INDEX: 31.96 KG/M2 | HEART RATE: 74 BPM | RESPIRATION RATE: 16 BRPM | OXYGEN SATURATION: 99 %

## 2021-10-13 DIAGNOSIS — F32.A ANXIETY AND DEPRESSION: ICD-10-CM

## 2021-10-13 DIAGNOSIS — E78.00 PURE HYPERCHOLESTEROLEMIA: ICD-10-CM

## 2021-10-13 DIAGNOSIS — E66.9 OBESITY WITH BODY MASS INDEX (BMI) OF 30.0 TO 39.9: ICD-10-CM

## 2021-10-13 DIAGNOSIS — R73.03 PREDIABETES: ICD-10-CM

## 2021-10-13 DIAGNOSIS — F41.9 ANXIETY AND DEPRESSION: ICD-10-CM

## 2021-10-13 DIAGNOSIS — E03.9 HYPOTHYROIDISM (ACQUIRED): ICD-10-CM

## 2021-10-13 DIAGNOSIS — Z51.81 THERAPEUTIC DRUG MONITORING: Primary | ICD-10-CM

## 2021-10-13 DIAGNOSIS — E55.9 VITAMIN D DEFICIENCY: ICD-10-CM

## 2021-10-13 DIAGNOSIS — I10 HYPERTENSION, BENIGN: ICD-10-CM

## 2021-10-13 PROCEDURE — 99213 OFFICE O/P EST LOW 20 MIN: CPT | Performed by: NURSE PRACTITIONER

## 2021-10-13 PROCEDURE — 3008F BODY MASS INDEX DOCD: CPT | Performed by: NURSE PRACTITIONER

## 2021-10-13 PROCEDURE — 3074F SYST BP LT 130 MM HG: CPT | Performed by: NURSE PRACTITIONER

## 2021-10-13 PROCEDURE — 3078F DIAST BP <80 MM HG: CPT | Performed by: NURSE PRACTITIONER

## 2021-10-13 NOTE — PROGRESS NOTES
HISTORY OF PRESENT ILLNESS  Patient presents with:  Weight Check: up 1    Carri Garnett is a 64year old female here for follow up with medical weight loss program for the treatment of overweight, obesity, or morbid obesity.      Up #1 lbs  Non-compliant pink, sclera non icteric, PERRLA  NECK: supple, no adenopathy  LUNGS: CTA in all fields, breathing non labored  CARDIO: RRR without murmur  GI: +BS, NT/ND, no masses or HSM  EXTREMITIES: no cyanosis, no clubbing, no edema  PSYCH: pleasant, cooperative, nor to visit.       ASSESSMENT/PLAN  (Z51.81) Therapeutic drug monitoring  (primary encounter diagnosis)    (E66.9) Obesity with body mass index (BMI) of 30.0 to 39.9    (E55.9) Vitamin D deficiency    (E78.00) Pure hypercholesterolemia    (R73.03) Prediabetes · Weight Loss Consent to treat reviewed and signed. Total time spent on chart review, pre-charting, obtaining history, counseling, and educating, reviewing labs was 25 minutes. There are no Patient Instructions on file for this visit.     No foll

## 2021-10-20 DIAGNOSIS — E03.9 HYPOTHYROIDISM (ACQUIRED): ICD-10-CM

## 2021-10-20 RX ORDER — LEVOTHYROXINE SODIUM 88 UG/1
TABLET ORAL
Qty: 90 TABLET | Refills: 0 | Status: SHIPPED | OUTPATIENT
Start: 2021-10-20 | End: 2022-01-17

## 2021-11-10 ENCOUNTER — OFFICE VISIT (OUTPATIENT)
Dept: FAMILY MEDICINE CLINIC | Facility: CLINIC | Age: 61
End: 2021-11-10
Payer: COMMERCIAL

## 2021-11-10 VITALS
DIASTOLIC BLOOD PRESSURE: 66 MMHG | WEIGHT: 212 LBS | RESPIRATION RATE: 16 BRPM | SYSTOLIC BLOOD PRESSURE: 116 MMHG | HEART RATE: 88 BPM | OXYGEN SATURATION: 99 % | BODY MASS INDEX: 32.89 KG/M2 | HEIGHT: 67.5 IN

## 2021-11-10 DIAGNOSIS — M67.40 GANGLION CYST: Primary | ICD-10-CM

## 2021-11-10 DIAGNOSIS — G89.29 CHRONIC PAIN OF LEFT KNEE: ICD-10-CM

## 2021-11-10 DIAGNOSIS — D17.22 LIPOMA OF LEFT UPPER EXTREMITY: ICD-10-CM

## 2021-11-10 DIAGNOSIS — M25.562 CHRONIC PAIN OF LEFT KNEE: ICD-10-CM

## 2021-11-10 PROCEDURE — 3008F BODY MASS INDEX DOCD: CPT | Performed by: FAMILY MEDICINE

## 2021-11-10 PROCEDURE — 99213 OFFICE O/P EST LOW 20 MIN: CPT | Performed by: FAMILY MEDICINE

## 2021-11-10 PROCEDURE — 3074F SYST BP LT 130 MM HG: CPT | Performed by: FAMILY MEDICINE

## 2021-11-10 PROCEDURE — 3078F DIAST BP <80 MM HG: CPT | Performed by: FAMILY MEDICINE

## 2021-11-10 NOTE — PROGRESS NOTES
HPI:   Wade Fernandez is a 64year old female here for wrist concerns and left knee pain     Pt reports right wrist mass for months - worse in the last week   Pt reports left wrist mass for 10-12 years- pt does not feel that it has not changed  No pain Comment: occasional    Drug use: No    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   Diet: watches calories closely     REVIEW OF SYSTEMS:   GENERAL: feels well otherwise  SKIN: denies any unusual skin lesions  EYES:de

## 2021-12-10 ENCOUNTER — IMMUNIZATION (OUTPATIENT)
Dept: LAB | Facility: HOSPITAL | Age: 61
End: 2021-12-10
Attending: EMERGENCY MEDICINE
Payer: COMMERCIAL

## 2021-12-10 DIAGNOSIS — Z23 NEED FOR VACCINATION: Primary | ICD-10-CM

## 2021-12-10 PROCEDURE — 0004A SARSCOV2 VAC 30MCG/0.3ML IM: CPT

## 2021-12-14 ENCOUNTER — OFFICE VISIT (OUTPATIENT)
Dept: INTERNAL MEDICINE CLINIC | Facility: CLINIC | Age: 61
End: 2021-12-14
Payer: COMMERCIAL

## 2021-12-14 VITALS
HEART RATE: 80 BPM | DIASTOLIC BLOOD PRESSURE: 70 MMHG | HEIGHT: 67.5 IN | OXYGEN SATURATION: 99 % | WEIGHT: 209 LBS | RESPIRATION RATE: 16 BRPM | BODY MASS INDEX: 32.42 KG/M2 | SYSTOLIC BLOOD PRESSURE: 118 MMHG

## 2021-12-14 DIAGNOSIS — E55.9 VITAMIN D DEFICIENCY: ICD-10-CM

## 2021-12-14 DIAGNOSIS — R73.03 PREDIABETES: ICD-10-CM

## 2021-12-14 DIAGNOSIS — E03.9 HYPOTHYROIDISM (ACQUIRED): ICD-10-CM

## 2021-12-14 DIAGNOSIS — Z51.81 THERAPEUTIC DRUG MONITORING: Primary | ICD-10-CM

## 2021-12-14 DIAGNOSIS — F32.A ANXIETY AND DEPRESSION: ICD-10-CM

## 2021-12-14 DIAGNOSIS — E66.9 OBESITY WITH BODY MASS INDEX (BMI) OF 30.0 TO 39.9: ICD-10-CM

## 2021-12-14 DIAGNOSIS — I10 HYPERTENSION, BENIGN: ICD-10-CM

## 2021-12-14 DIAGNOSIS — F41.9 ANXIETY AND DEPRESSION: ICD-10-CM

## 2021-12-14 DIAGNOSIS — E78.00 PURE HYPERCHOLESTEROLEMIA: ICD-10-CM

## 2021-12-14 PROCEDURE — 3074F SYST BP LT 130 MM HG: CPT | Performed by: NURSE PRACTITIONER

## 2021-12-14 PROCEDURE — 3008F BODY MASS INDEX DOCD: CPT | Performed by: NURSE PRACTITIONER

## 2021-12-14 PROCEDURE — 3078F DIAST BP <80 MM HG: CPT | Performed by: NURSE PRACTITIONER

## 2021-12-14 PROCEDURE — 99213 OFFICE O/P EST LOW 20 MIN: CPT | Performed by: NURSE PRACTITIONER

## 2021-12-14 NOTE — PROGRESS NOTES
HISTORY OF PRESENT ILLNESS  Patient presents with:  Weight Check: up 3    Juaquin Frankel is a 64year old female here for follow up with medical weight loss program for the treatment of overweight, obesity, or morbid obesity.      Up #3 lbs  Compliant on (H) 04/12/2021    BUN 21 (H) 04/12/2021    BUNCREA 25.6 (H) 04/12/2021    CREATSERUM 0.82 04/12/2021    ANIONGAP 4 04/12/2021    GFR >59 08/04/2010    GFRNAA 77 04/12/2021    GFRAA 89 04/12/2021    CA 9.5 04/12/2021    OSMOCALC 289 04/12/2021    ALKPHO 100 F32.A) Anxiety and depression      PLAN   Initial Weight Data and Goal Weight Loss:  Initial consult: #265 lbs on 1/2020  Weight Calculations  Initial Weight: 265 lbs  Initial Weight Date: 01/20/20  Today's Weight: 209 lbs  5% Goal: 13.25  10% Goal: 26.5 minutes. There are no Patient Instructions on file for this visit. No follow-ups on file. Patient verbalizes understanding.     VIMAL Connor

## 2021-12-15 ENCOUNTER — TELEPHONE (OUTPATIENT)
Dept: INTERNAL MEDICINE CLINIC | Facility: CLINIC | Age: 61
End: 2021-12-15

## 2021-12-15 NOTE — TELEPHONE ENCOUNTER
Soren iNno, Lynne Alfonso, SHASHANK Batista,   I sent a message to Dr. Alberto Cui (cardiologist) with this patients last office appt in 10/2021- and I have not heard anything back.  Can you please call her office and see if she got the message and i

## 2021-12-15 NOTE — TELEPHONE ENCOUNTER
I called Dr. Tarun Kinney office and they said they cannot see messages in epic, we need to fax them any questions/results.

## 2021-12-15 NOTE — PATIENT INSTRUCTIONS
We are here to support you with weight loss, but please remember that you still need your primary care provider for your routine health maintenance.       PLAN:  Will continue with metformin 1,000 mg bid  Follow up with me in 8 weeks  Schedule follow up mary Daily INPUT> Look at nutrition section-- \"nutrients\" and it will break down your macros for the day (ie. Protein, carbs, fibers, sugars and fats). Try to stay within these numbers daily     2.  \"7 minute workout\" to help with exercise/activity which sameer

## 2021-12-22 DIAGNOSIS — Z51.81 THERAPEUTIC DRUG MONITORING: ICD-10-CM

## 2021-12-22 DIAGNOSIS — R73.03 PREDIABETES: ICD-10-CM

## 2021-12-22 NOTE — TELEPHONE ENCOUNTER
Requesting metformin 1000 mg  LOV: 12/14/21  RTC: 2 months  Last Relevant Labs: 4/12/21  Filled: 12/14/21 #180 with 0 refills    2/8/2022 12:40 PM Sukhi Vega APRN [facility] [address]     Receipt confirmed at pharmacy requesting - denied with note

## 2021-12-27 ENCOUNTER — PATIENT MESSAGE (OUTPATIENT)
Dept: INTERNAL MEDICINE CLINIC | Facility: CLINIC | Age: 61
End: 2021-12-27

## 2021-12-28 NOTE — TELEPHONE ENCOUNTER
From: VIMAL Roger  To: Izabella Stiles  Sent: 12/15/2021 12:41 PM CST  Subject: fax to cardiologist    Jason Kovacs,   Thank you for the message, our nurse found out the same information.  We faxed over the letter this morning to Dr. Fred Lamas an

## 2021-12-29 ENCOUNTER — PATIENT MESSAGE (OUTPATIENT)
Dept: INTERNAL MEDICINE CLINIC | Facility: CLINIC | Age: 61
End: 2021-12-29

## 2021-12-29 NOTE — TELEPHONE ENCOUNTER
From: VIMAL Dorman  To: Shauna Bunch  Sent: 12/15/2021 12:41 PM CST  Subject: fax to cardiologist    Robles Weldon,   Thank you for the message, our nurse found out the same information.  We faxed over the letter this morning to Dr. Argentina Browning an

## 2022-01-10 ENCOUNTER — HOSPITAL ENCOUNTER (OUTPATIENT)
Dept: MAMMOGRAPHY | Age: 62
Discharge: HOME | End: 2022-01-10
Attending: FAMILY MEDICINE
Payer: COMMERCIAL

## 2022-01-10 DIAGNOSIS — Z12.31 ENCOUNTER FOR SCREENING MAMMOGRAM FOR HIGH-RISK PATIENT: ICD-10-CM

## 2022-01-10 PROCEDURE — 77067 SCR MAMMO BI INCL CAD: CPT | Performed by: FAMILY MEDICINE

## 2022-01-10 PROCEDURE — 77063 BREAST TOMOSYNTHESIS BI: CPT | Performed by: FAMILY MEDICINE

## 2022-01-11 ENCOUNTER — ORDER TRANSCRIPTION (OUTPATIENT)
Dept: ADMINISTRATIVE | Facility: HOSPITAL | Age: 62
End: 2022-01-11

## 2022-01-11 DIAGNOSIS — Z13.6 SCREENING FOR CARDIOVASCULAR CONDITION: Primary | ICD-10-CM

## 2022-01-16 DIAGNOSIS — E03.9 HYPOTHYROIDISM (ACQUIRED): ICD-10-CM

## 2022-01-17 RX ORDER — LEVOTHYROXINE SODIUM 88 UG/1
TABLET ORAL
Qty: 90 TABLET | Refills: 0 | Status: SHIPPED | OUTPATIENT
Start: 2022-01-17

## 2022-01-18 ENCOUNTER — TELEPHONE (OUTPATIENT)
Dept: INTERNAL MEDICINE CLINIC | Facility: CLINIC | Age: 62
End: 2022-01-18

## 2022-01-18 NOTE — TELEPHONE ENCOUNTER
Sis Forward called from Dr Dex Montoya office- left vmail   Stated pt ok to come back to the weight loss clinic and ok to start rx phentermine

## 2022-02-08 ENCOUNTER — TELEMEDICINE (OUTPATIENT)
Dept: INTERNAL MEDICINE CLINIC | Facility: CLINIC | Age: 62
End: 2022-02-08

## 2022-02-08 DIAGNOSIS — Z51.81 THERAPEUTIC DRUG MONITORING: Primary | ICD-10-CM

## 2022-02-08 DIAGNOSIS — E78.00 PURE HYPERCHOLESTEROLEMIA: ICD-10-CM

## 2022-02-08 DIAGNOSIS — I10 HYPERTENSION, BENIGN: ICD-10-CM

## 2022-02-08 DIAGNOSIS — R73.03 PREDIABETES: ICD-10-CM

## 2022-02-08 DIAGNOSIS — E55.9 VITAMIN D DEFICIENCY: ICD-10-CM

## 2022-02-08 DIAGNOSIS — E66.9 OBESITY WITH BODY MASS INDEX (BMI) OF 30.0 TO 39.9: ICD-10-CM

## 2022-02-08 DIAGNOSIS — E03.9 HYPOTHYROIDISM (ACQUIRED): ICD-10-CM

## 2022-02-08 PROCEDURE — 99213 OFFICE O/P EST LOW 20 MIN: CPT | Performed by: NURSE PRACTITIONER

## 2022-02-08 RX ORDER — PHENTERMINE HYDROCHLORIDE 15 MG/1
15 CAPSULE ORAL EVERY MORNING
Qty: 30 CAPSULE | Refills: 1 | Status: SHIPPED | OUTPATIENT
Start: 2022-02-08 | End: 2022-03-21

## 2022-02-08 NOTE — PATIENT INSTRUCTIONS
We are here to support you with weight loss, but please remember that you still need your primary care provider for your routine health maintenance. PLAN:  Will restart phentermine 15mg daily  Will continue with metformin 500mg bid  Follow up with me in 5-8 weeks  Schedule follow up appointments: Amber Young (dietitian) or Phani Yu (presurgery dietitian)   Check for insurance coverage for dietitian and labwork prior to scheduling appointment. Please try to work on the following dietary changes:  1. Goals: Aim for 20-30 grams of protein/ meal  i. Aim for 120 grams of carbohydrates/day  ii. Eat 4-6 vegetables/day  iii. Avoid skipping meals- eat every 4-5 hours  iv. Aim for 3 meals/day  2. Drink lots of water and cut down on soda/juice consumption if soda/juice drinker  3. Focus on protein: (15-30 grams with each meal) ie. greek yogurt, cottage cheese, string cheese, hard boiled eggs  4. Healthy snacks: peanut butter and apples, hummus and carrots, berries, nuts (1/4 cup), tuna and crackers                 Protein Shakes: Premier protein or Core Power                Protein Bars: Rx Bars, Oatmega, Power Crunch                 Sargento balanced breaks (cheese and nuts)- without chocolate  5. Reduce carbohydrates which includes sweets as well as rice, pasta, potatoes, bread, corn and instead choose whole grain options or more protein or vegetables (4-6 servings of vegetables per day)  6. Get a good night of sleep  7. Try to decrease stress in life     Please download apps:  1. \"My Fitness Pal\" (other option is Lose it)) to help you to monitor daily dietary intake and you will be able to see if you are eating the right amount of calories, protein, carbs                With My Fitness Pal-->When you set-up the mary or need to adjust settings:                Goals should include:                  Lose 1.5-2 lbs per week                Activity level: not very active (can't count exercise towards calorie number per day)                   ** Daily INPUT> Look at nutrition section-- \"nutrients\" and it will break down your macros for the day (ie. Protein, carbs, fibers, sugars and fats). Try to stay within these numbers daily     2. \"7 minute workout\" to help with exercise/activity which takes 7 minutes of your day and that you can do at home! 3. \"Calm\" or \"Headspace\" which helps with mindfulness, meditation, clarity, sleep, and shahzad to your daily life. 4. [employer] blog for healthy recipe ideas  5. [employer] for low carb resources    HIGH PROTEIN SNACK IDEAS  -cottage cheese  -plain yogurt  -kefir  -hard-boiled eggs  -natural cheeses  -nuts (measure portion size)   -unsweetened nut butters  -dried edamame   -elvin seeds soaked in water or almond milk  -soy nuts  -cured meats (monitor for sodium issues)   -hummus with vegetables  -bean dip with vegetables     FRUIT  Low carb fruit options   Raspberries: Half a cup (60 grams) contains 3 grams of carbs. Blackberries: Half a cup (70 grams) contains 4 grams of carbs. Strawberries: Half a cup (100 grams) contains 6 grams of carbs. Blueberries: Half a cup (50 grams) contains 6 grams of carbs. Plum: One medium-sized (80 grams) contains 6 grams of carbs.      VEGETABLES  Low carb vegetables

## 2022-03-21 ENCOUNTER — OFFICE VISIT (OUTPATIENT)
Dept: INTERNAL MEDICINE CLINIC | Facility: CLINIC | Age: 62
End: 2022-03-21
Payer: COMMERCIAL

## 2022-03-21 VITALS
RESPIRATION RATE: 16 BRPM | HEART RATE: 82 BPM | BODY MASS INDEX: 32.57 KG/M2 | OXYGEN SATURATION: 97 % | SYSTOLIC BLOOD PRESSURE: 118 MMHG | WEIGHT: 210 LBS | DIASTOLIC BLOOD PRESSURE: 68 MMHG | HEIGHT: 67.5 IN

## 2022-03-21 DIAGNOSIS — I10 HYPERTENSION, BENIGN: ICD-10-CM

## 2022-03-21 DIAGNOSIS — E03.9 HYPOTHYROIDISM (ACQUIRED): ICD-10-CM

## 2022-03-21 DIAGNOSIS — F41.9 ANXIETY AND DEPRESSION: ICD-10-CM

## 2022-03-21 DIAGNOSIS — Z51.81 THERAPEUTIC DRUG MONITORING: Primary | ICD-10-CM

## 2022-03-21 DIAGNOSIS — E55.9 VITAMIN D DEFICIENCY: ICD-10-CM

## 2022-03-21 DIAGNOSIS — E78.00 PURE HYPERCHOLESTEROLEMIA: ICD-10-CM

## 2022-03-21 DIAGNOSIS — R73.03 PREDIABETES: ICD-10-CM

## 2022-03-21 DIAGNOSIS — E66.9 OBESITY WITH BODY MASS INDEX (BMI) OF 30.0 TO 39.9: ICD-10-CM

## 2022-03-21 DIAGNOSIS — F32.A ANXIETY AND DEPRESSION: ICD-10-CM

## 2022-03-21 PROCEDURE — 3074F SYST BP LT 130 MM HG: CPT | Performed by: NURSE PRACTITIONER

## 2022-03-21 PROCEDURE — 3008F BODY MASS INDEX DOCD: CPT | Performed by: NURSE PRACTITIONER

## 2022-03-21 PROCEDURE — 3078F DIAST BP <80 MM HG: CPT | Performed by: NURSE PRACTITIONER

## 2022-03-21 PROCEDURE — 99214 OFFICE O/P EST MOD 30 MIN: CPT | Performed by: NURSE PRACTITIONER

## 2022-03-21 RX ORDER — ORAL SEMAGLUTIDE 7 MG/1
7 TABLET ORAL DAILY
Qty: 30 TABLET | Refills: 0 | Status: SHIPPED | OUTPATIENT
Start: 2022-03-21 | End: 2022-04-20

## 2022-03-21 RX ORDER — PHENTERMINE HYDROCHLORIDE 37.5 MG/1
37.5 TABLET ORAL
Qty: 30 TABLET | Refills: 1 | Status: SHIPPED | OUTPATIENT
Start: 2022-03-21

## 2022-03-21 RX ORDER — PHENTERMINE HYDROCHLORIDE 15 MG/1
15 CAPSULE ORAL EVERY MORNING
Qty: 30 CAPSULE | Refills: 1 | Status: CANCELLED | OUTPATIENT
Start: 2022-03-21

## 2022-03-21 RX ORDER — ORAL SEMAGLUTIDE 3 MG/1
3 TABLET ORAL DAILY
Qty: 30 TABLET | Refills: 0 | COMMUNITY
Start: 2022-03-21 | End: 2022-04-20

## 2022-03-21 NOTE — PATIENT INSTRUCTIONS
We are here to support you with weight loss, but please remember that you still need your primary care provider for your routine health maintenance. PLAN:  Will increase phentermine, continue with metformin, and add in rybelsus 3mg daily (take on an empty stomach) and wait 30 minutes before eating or drinking anything  Follow up with me in 8-12 weeks  Schedule follow up appointments: Mony Villaseñor (dietitian) or Subhash Figueroa (presurgery dietitian)   Check for insurance coverage for dietitian and labwork prior to scheduling appointment. Please try to work on the following dietary changes:  1. Goals: Aim for 20-30 grams of protein/ meal  i. Aim for 110 grams of carbohydrates/day  ii. Eat 4-6 vegetables/day  iii. Avoid skipping meals- eat every 4-5 hours  iv. Aim for 3 meals/day  2. Drink lots of water and cut down on soda/juice consumption if soda/juice drinker  3. Focus on protein: (15-30 grams with each meal) ie. greek yogurt, cottage cheese, string cheese, hard boiled eggs  4. Healthy snacks: peanut butter and apples, hummus and carrots, berries, nuts (1/4 cup), tuna and crackers                 Protein Shakes: Premier protein or Core Power                Protein Bars: Rx Bars, Oatmega, Power Crunch                 Sargento balanced breaks (cheese and nuts)- without chocolate  5. Reduce carbohydrates which includes sweets as well as rice, pasta, potatoes, bread, corn and instead choose whole grain options or more protein or vegetables (4-6 servings of vegetables per day)  6. Get a good night of sleep  7. Try to decrease stress in life     Please download apps:  1. \"My Fitness Pal\" (other option is Lose it)) to help you to monitor daily dietary intake and you will be able to see if you are eating the right amount of calories, protein, carbs                With My Fitness Pal-->When you set-up the mary or need to adjust settings:                Goals should include:                  Lose 1.5-2 lbs per week Activity level: not very active (can't count exercise towards calorie number per day)                   ** Daily INPUT> Look at nutrition section-- \"nutrients\" and it will break down your macros for the day (ie. Protein, carbs, fibers, sugars and fats). Try to stay within these numbers daily     2. \"7 minute workout\" to help with exercise/activity which takes 7 minutes of your day and that you can do at home! 3. \"Calm\" or \"Headspace\" which helps with mindfulness, meditation, clarity, sleep, and shahzad to your daily life. 4. [employer] blog for healthy recipe ideas  5. [employer] for low carb resources    HIGH PROTEIN SNACK IDEAS  -cottage cheese  -plain yogurt  -kefir  -hard-boiled eggs  -natural cheeses  -nuts (measure portion size)   -unsweetened nut butters  -dried edamame   -elvin seeds soaked in water or almond milk  -soy nuts  -cured meats (monitor for sodium issues)   -hummus with vegetables  -bean dip with vegetables     FRUIT  Low carb fruit options   Raspberries: Half a cup (60 grams) contains 3 grams of carbs. Blackberries: Half a cup (70 grams) contains 4 grams of carbs. Strawberries: Half a cup (100 grams) contains 6 grams of carbs. Blueberries: Half a cup (50 grams) contains 6 grams of carbs. Plum: One medium-sized (80 grams) contains 6 grams of carbs.      VEGETABLES  Low carb vegetables

## 2022-03-28 ENCOUNTER — PATIENT MESSAGE (OUTPATIENT)
Dept: INTERNAL MEDICINE CLINIC | Facility: CLINIC | Age: 62
End: 2022-03-28

## 2022-04-18 RX ORDER — LEVOTHYROXINE SODIUM 88 UG/1
TABLET ORAL
Qty: 90 TABLET | Refills: 0 | Status: SHIPPED | OUTPATIENT
Start: 2022-04-18

## 2022-05-02 ENCOUNTER — OFFICE VISIT (OUTPATIENT)
Dept: INTERNAL MEDICINE CLINIC | Facility: CLINIC | Age: 62
End: 2022-05-02
Payer: COMMERCIAL

## 2022-05-02 VITALS
SYSTOLIC BLOOD PRESSURE: 114 MMHG | DIASTOLIC BLOOD PRESSURE: 72 MMHG | HEIGHT: 67.5 IN | WEIGHT: 204 LBS | RESPIRATION RATE: 16 BRPM | OXYGEN SATURATION: 97 % | HEART RATE: 86 BPM | BODY MASS INDEX: 31.64 KG/M2

## 2022-05-02 DIAGNOSIS — E03.9 HYPOTHYROIDISM (ACQUIRED): ICD-10-CM

## 2022-05-02 DIAGNOSIS — R73.03 PREDIABETES: ICD-10-CM

## 2022-05-02 DIAGNOSIS — E66.9 OBESITY WITH BODY MASS INDEX (BMI) OF 30.0 TO 39.9: ICD-10-CM

## 2022-05-02 DIAGNOSIS — F41.9 ANXIETY AND DEPRESSION: ICD-10-CM

## 2022-05-02 DIAGNOSIS — E55.9 VITAMIN D DEFICIENCY: ICD-10-CM

## 2022-05-02 DIAGNOSIS — Z51.81 THERAPEUTIC DRUG MONITORING: Primary | ICD-10-CM

## 2022-05-02 DIAGNOSIS — F32.A ANXIETY AND DEPRESSION: ICD-10-CM

## 2022-05-02 DIAGNOSIS — E78.00 PURE HYPERCHOLESTEROLEMIA: ICD-10-CM

## 2022-05-02 DIAGNOSIS — I10 HYPERTENSION, BENIGN: ICD-10-CM

## 2022-05-02 PROCEDURE — 99213 OFFICE O/P EST LOW 20 MIN: CPT | Performed by: NURSE PRACTITIONER

## 2022-05-02 PROCEDURE — 3008F BODY MASS INDEX DOCD: CPT | Performed by: NURSE PRACTITIONER

## 2022-05-02 PROCEDURE — 3074F SYST BP LT 130 MM HG: CPT | Performed by: NURSE PRACTITIONER

## 2022-05-02 PROCEDURE — 3078F DIAST BP <80 MM HG: CPT | Performed by: NURSE PRACTITIONER

## 2022-05-02 RX ORDER — VENLAFAXINE HYDROCHLORIDE 75 MG/1
CAPSULE, EXTENDED RELEASE ORAL
Qty: 90 CAPSULE | Refills: 0 | Status: SHIPPED | OUTPATIENT
Start: 2022-05-02

## 2022-05-02 RX ORDER — PHENTERMINE HYDROCHLORIDE 37.5 MG/1
37.5 TABLET ORAL
Qty: 30 TABLET | Refills: 1 | Status: CANCELLED | OUTPATIENT
Start: 2022-05-02

## 2022-05-02 NOTE — PATIENT INSTRUCTIONS
We are here to support you with weight loss, but please remember that you still need your primary care provider for your routine health maintenance. PLAN:  Will continue with phentermine 37.5mg daily   Keep up the great work with strength training   Follow up with me in 8-12 weeks  Schedule follow up appointments: Siobhan Gant (dietitian) or Claribel Alcazar (presurgery dietitian)   Check for insurance coverage for dietitian and labwork prior to scheduling appointment. Please try to work on the following dietary changes:  1. Goals: Aim for 20-30 grams of protein/ meal  i. Aim for 100 grams of carbohydrates/day  ii. Eat 4-6 vegetables/day  iii. Avoid skipping meals- eat every 4-5 hours  iv. Aim for 3 meals/day  2. Drink lots of water and cut down on soda/juice consumption if soda/juice drinker  3. Focus on protein: (15-30 grams with each meal) ie. greek yogurt, cottage cheese, string cheese, hard boiled eggs  4. Healthy snacks: peanut butter and apples, hummus and carrots, berries, nuts (1/4 cup), tuna and crackers                 Protein Shakes: Premier protein or Core Power                Protein Bars: Rx Bars, Oatmega, Power Crunch                 S[employer]ento balanced breaks (cheese and nuts)- without chocolate  5. Reduce carbohydrates which includes sweets as well as rice, pasta, potatoes, bread, corn and instead choose whole grain options or more protein or vegetables (4-6 servings of vegetables per day)  6. Get a good night of sleep  7. Try to decrease stress in life     Please download apps:  1. \"My Fitness Pal\" (other option is Lose it)) to help you to monitor daily dietary intake and you will be able to see if you are eating the right amount of calories, protein, carbs                With My Fitness Pal-->When you set-up the mary or need to adjust settings:                Goals should include:                  Lose 1.5-2 lbs per week                Activity level: not very active (can't count exercise towards calorie number per day)                   ** Daily INPUT> Look at nutrition section-- \"nutrients\" and it will break down your macros for the day (ie. Protein, carbs, fibers, sugars and fats). Try to stay within these numbers daily     2. \"7 minute workout\" to help with exercise/activity which takes 7 minutes of your day and that you can do at home! 3. \"Calm\" or \"Headspace\" which helps with mindfulness, meditation, clarity, sleep, and shahzad to your daily life. 4. [employer] blog for healthy recipe ideas  5. [employer] for low carb resources    HIGH PROTEIN SNACK IDEAS  -cottage cheese  -plain yogurt  -kefir  -hard-boiled eggs  -natural cheeses  -nuts (measure portion size)   -unsweetened nut butters  -dried edamame   -elvin seeds soaked in water or almond milk  -soy nuts  -cured meats (monitor for sodium issues)   -hummus with vegetables  -bean dip with vegetables     FRUIT  Low carb fruit options   Raspberries: Half a cup (60 grams) contains 3 grams of carbs. Blackberries: Half a cup (70 grams) contains 4 grams of carbs. Strawberries: Half a cup (100 grams) contains 6 grams of carbs. Blueberries: Half a cup (50 grams) contains 6 grams of carbs. Plum: One medium-sized (80 grams) contains 6 grams of carbs.      VEGETABLES  Low carb vegetables

## 2022-05-02 NOTE — TELEPHONE ENCOUNTER
Next Appt:    With [address] [place] [address] Dell Emmanuel DO)  05/27/2022 at 10:00 AM    LV 11-10-21    LR 10-1-21

## 2022-05-20 ENCOUNTER — LAB ENCOUNTER (OUTPATIENT)
Dept: LAB | Age: 62
End: 2022-05-20
Attending: INTERNAL MEDICINE
Payer: COMMERCIAL

## 2022-05-20 DIAGNOSIS — E78.00 PURE HYPERCHOLESTEROLEMIA: Primary | ICD-10-CM

## 2022-05-20 DIAGNOSIS — R73.9 HYPERGLYCEMIA: ICD-10-CM

## 2022-05-20 LAB
ALBUMIN SERPL-MCNC: 4.4 G/DL (ref 3.4–5)
ALBUMIN/GLOB SERPL: 1 {RATIO} (ref 1–2)
ALP LIVER SERPL-CCNC: 78 U/L
ALT SERPL-CCNC: 27 U/L
ANION GAP SERPL CALC-SCNC: 10 MMOL/L (ref 0–18)
AST SERPL-CCNC: 23 U/L (ref 15–37)
BILIRUB SERPL-MCNC: 0.7 MG/DL (ref 0.1–2)
BUN BLD-MCNC: 17 MG/DL (ref 7–18)
CALCIUM BLD-MCNC: 9.9 MG/DL (ref 8.5–10.1)
CHLORIDE SERPL-SCNC: 105 MMOL/L (ref 98–112)
CHOLEST SERPL-MCNC: 139 MG/DL (ref ?–200)
CO2 SERPL-SCNC: 22 MMOL/L (ref 21–32)
CREAT BLD-MCNC: 0.88 MG/DL
EST. AVERAGE GLUCOSE BLD GHB EST-MCNC: 114 MG/DL (ref 68–126)
FASTING PATIENT LIPID ANSWER: YES
FASTING STATUS PATIENT QL REPORTED: YES
GLOBULIN PLAS-MCNC: 4.2 G/DL (ref 2.8–4.4)
GLUCOSE BLD-MCNC: 102 MG/DL (ref 70–99)
HBA1C MFR BLD: 5.6 % (ref ?–5.7)
HDLC SERPL-MCNC: 66 MG/DL (ref 40–59)
LDLC SERPL CALC-MCNC: 53 MG/DL (ref ?–100)
NONHDLC SERPL-MCNC: 73 MG/DL (ref ?–130)
OSMOLALITY SERPL CALC.SUM OF ELEC: 286 MOSM/KG (ref 275–295)
POTASSIUM SERPL-SCNC: 4.3 MMOL/L (ref 3.5–5.1)
PROT SERPL-MCNC: 8.6 G/DL (ref 6.4–8.2)
SODIUM SERPL-SCNC: 137 MMOL/L (ref 136–145)
TRIGL SERPL-MCNC: 110 MG/DL (ref 30–149)
TSI SER-ACNC: 1.13 MIU/ML (ref 0.36–3.74)
VLDLC SERPL CALC-MCNC: 16 MG/DL (ref 0–30)

## 2022-05-20 PROCEDURE — 84443 ASSAY THYROID STIM HORMONE: CPT

## 2022-05-20 PROCEDURE — 83036 HEMOGLOBIN GLYCOSYLATED A1C: CPT

## 2022-05-20 PROCEDURE — 36415 COLL VENOUS BLD VENIPUNCTURE: CPT

## 2022-05-20 PROCEDURE — 80061 LIPID PANEL: CPT

## 2022-05-20 PROCEDURE — 80053 COMPREHEN METABOLIC PANEL: CPT

## 2022-06-04 ENCOUNTER — PATIENT MESSAGE (OUTPATIENT)
Dept: INTERNAL MEDICINE CLINIC | Facility: CLINIC | Age: 62
End: 2022-06-04

## 2022-06-05 RX ORDER — PHENTERMINE HYDROCHLORIDE 37.5 MG/1
37.5 TABLET ORAL
Qty: 30 TABLET | Refills: 1 | Status: SHIPPED | OUTPATIENT
Start: 2022-06-05

## 2022-06-05 NOTE — TELEPHONE ENCOUNTER
Requesting Phentermine  LOV: 5/2/22  RTC: 8 weeks  Last Relevant Labs: na  Filled: 3/21/22 #30 with 1 refills last [place] 5[place]6/22 [place]    Future Appointments   Date Time Provider Manolo Shelby   6/13/2022 12:40 PM VIMAL Cobos [facility] [address]   11/1/2022 11:00 AM Geri Templeton DO [facility] [address]

## 2022-06-05 NOTE — TELEPHONE ENCOUNTER
From: Angie Esteban  To: Liliana BañuelosrVIMAL  Sent: 6/4/2022 1:12 PM CDT  Subject: Refill needed     Hi Shiv ,  I need my phentermine refilled. Leaving for [place] on Monday. Baby had to be born early due to preeclampsia.   Thanks  Major Doss

## 2022-06-05 NOTE — TELEPHONE ENCOUNTER
Esther Infante approved the refill - but it did not transmit. I told her I would call the pharmacy. I called Miriam Potts to approve the refill as written today. I spoke with Rusty Waters - he will get ready.

## 2022-06-06 NOTE — TELEPHONE ENCOUNTER
See previous message.  This was approved by [facility] yesterday and called to the pharmacist.

## 2022-06-09 RX ORDER — PHENTERMINE HYDROCHLORIDE 37.5 MG/1
TABLET ORAL
Qty: 30 TABLET | Refills: 0 | OUTPATIENT
Start: 2022-06-09

## 2022-07-26 ENCOUNTER — OFFICE VISIT (OUTPATIENT)
Dept: INTERNAL MEDICINE CLINIC | Facility: CLINIC | Age: 62
End: 2022-07-26
Payer: COMMERCIAL

## 2022-07-26 VITALS
HEIGHT: 67.5 IN | HEART RATE: 79 BPM | RESPIRATION RATE: 16 BRPM | TEMPERATURE: 98 F | DIASTOLIC BLOOD PRESSURE: 74 MMHG | BODY MASS INDEX: 31.64 KG/M2 | OXYGEN SATURATION: 100 % | SYSTOLIC BLOOD PRESSURE: 120 MMHG | WEIGHT: 204 LBS

## 2022-07-26 DIAGNOSIS — Z51.81 THERAPEUTIC DRUG MONITORING: Primary | ICD-10-CM

## 2022-07-26 DIAGNOSIS — E78.00 PURE HYPERCHOLESTEROLEMIA: ICD-10-CM

## 2022-07-26 DIAGNOSIS — R73.03 PREDIABETES: ICD-10-CM

## 2022-07-26 DIAGNOSIS — E66.9 OBESITY WITH BODY MASS INDEX (BMI) OF 30.0 TO 39.9: ICD-10-CM

## 2022-07-26 DIAGNOSIS — E55.9 VITAMIN D DEFICIENCY: ICD-10-CM

## 2022-07-26 DIAGNOSIS — E03.9 HYPOTHYROIDISM (ACQUIRED): ICD-10-CM

## 2022-07-26 DIAGNOSIS — I10 HYPERTENSION, BENIGN: ICD-10-CM

## 2022-07-26 PROCEDURE — 3008F BODY MASS INDEX DOCD: CPT | Performed by: NURSE PRACTITIONER

## 2022-07-26 PROCEDURE — 3078F DIAST BP <80 MM HG: CPT | Performed by: NURSE PRACTITIONER

## 2022-07-26 PROCEDURE — 3074F SYST BP LT 130 MM HG: CPT | Performed by: NURSE PRACTITIONER

## 2022-07-26 PROCEDURE — 99214 OFFICE O/P EST MOD 30 MIN: CPT | Performed by: NURSE PRACTITIONER

## 2022-07-26 RX ORDER — PHENTERMINE HYDROCHLORIDE 37.5 MG/1
37.5 TABLET ORAL
Qty: 30 TABLET | Refills: 2 | Status: SHIPPED | OUTPATIENT
Start: 2022-07-30

## 2022-07-26 RX ORDER — TIRZEPATIDE 2.5 MG/.5ML
2.5 INJECTION, SOLUTION SUBCUTANEOUS WEEKLY
Qty: 2 ML | Refills: 0 | Status: SHIPPED | OUTPATIENT
Start: 2022-07-26

## 2022-07-26 NOTE — PATIENT INSTRUCTIONS
We are here to support you with weight loss, but please remember that you still need your primary care provider for your routine health maintenance. PLAN:  Will continue with phentermine  Will trial mounjaro 2.5mg weekly X 4 weeks and then increase to 5mg weekly   Start wean from metformin   Follow up with me in 8 weeks  Schedule follow up appointments: Alo Bustos (dietitian) or Rachele Jackson (presurgery dietitian)   Check for insurance coverage for dietitian and labwork prior to scheduling appointment. Please try to work on the following dietary changes:  1. Goals: Aim for 20-30 grams of protein/ meal  i. Aim for 100 grams of carbohydrates/day  ii. Eat 4-6 vegetables/day  iii. Avoid skipping meals- eat every 4-5 hours  iv. Aim for 3 meals/day  2. Drink lots of water and cut down on soda/juice consumption if soda/juice drinker  3. Focus on protein: (15-30 grams with each meal) ie. greek yogurt, cottage cheese, string cheese, hard boiled eggs  4. Healthy snacks: peanut butter and apples, hummus and carrots, berries, nuts (1/4 cup), tuna and crackers                 Protein Shakes: Premier protein or Core Power                Protein Bars: Rx Bars, Oatmega, Power Crunch                 Sargento balanced breaks (cheese and nuts)- without chocolate  5. Reduce carbohydrates which includes sweets as well as rice, pasta, potatoes, bread, corn and instead choose whole grain options or more protein or vegetables (4-6 servings of vegetables per day)  6. Get a good night of sleep  7. Try to decrease stress in life     Please download apps:  1. \"My Fitness Pal\" (other option is Lose it)) to help you to monitor daily dietary intake and you will be able to see if you are eating the right amount of calories, protein, carbs                With My Fitness Pal-->When you set-up the mary or need to adjust settings:                Goals should include:                  Lose 1.5-2 lbs per week                Activity level: not very active (can't count exercise towards calorie number per day)                   ** Daily INPUT> Look at nutrition section-- \"nutrients\" and it will break down your macros for the day (ie. Protein, carbs, fibers, sugars and fats). Try to stay within these numbers daily     2. \"7 minute workout\" to help with exercise/activity which takes 7 minutes of your day and that you can do at home! 3. \"Calm\" or \"Headspace\" which helps with mindfulness, meditation, clarity, sleep, and shahzad to your daily life. 4. [employer] blog for healthy recipe ideas  5. [employer] for low carb resources    HIGH PROTEIN SNACK IDEAS  -cottage cheese  -plain yogurt  -kefir  -hard-boiled eggs  -natural cheeses  -nuts (measure portion size)   -unsweetened nut butters  -dried edamame   -elvin seeds soaked in water or almond milk  -soy nuts  -cured meats (monitor for sodium issues)   -hummus with vegetables  -bean dip with vegetables     FRUIT  Low carb fruit options   Raspberries: Half a cup (60 grams) contains 3 grams of carbs. Blackberries: Half a cup (70 grams) contains 4 grams of carbs. Strawberries: Half a cup (100 grams) contains 6 grams of carbs. Blueberries: Half a cup (50 grams) contains 6 grams of carbs. Plum: One medium-sized (80 grams) contains 6 grams of carbs.      VEGETABLES  Low carb vegetables

## 2022-08-23 DIAGNOSIS — E66.9 OBESITY WITH BODY MASS INDEX (BMI) OF 30.0 TO 39.9: ICD-10-CM

## 2022-08-23 DIAGNOSIS — Z51.81 THERAPEUTIC DRUG MONITORING: ICD-10-CM

## 2022-08-23 NOTE — TELEPHONE ENCOUNTER
Requesting Mounjaro  LOV: 7/26/22  RTC: 8 weeks  Last Relevant Labs: 5/20/22  Filled: 7/26/22 #2ml with 0 refills    Future Appointments   Date Time Provider Manolo Shelby   9/13/2022  1:40 PM VIMAL Hernandez [facility] [address]

## 2022-08-24 RX ORDER — TIRZEPATIDE 2.5 MG/.5ML
2.5 INJECTION, SOLUTION SUBCUTANEOUS WEEKLY
Qty: 2 ML | Refills: 0 | Status: SHIPPED | OUTPATIENT
Start: 2022-08-24

## 2022-09-13 ENCOUNTER — OFFICE VISIT (OUTPATIENT)
Dept: INTERNAL MEDICINE CLINIC | Facility: CLINIC | Age: 62
End: 2022-09-13
Payer: COMMERCIAL

## 2022-09-13 VITALS
WEIGHT: 197 LBS | HEIGHT: 67.5 IN | SYSTOLIC BLOOD PRESSURE: 118 MMHG | RESPIRATION RATE: 16 BRPM | HEART RATE: 101 BPM | DIASTOLIC BLOOD PRESSURE: 80 MMHG | BODY MASS INDEX: 30.56 KG/M2 | OXYGEN SATURATION: 98 %

## 2022-09-13 DIAGNOSIS — E55.9 VITAMIN D DEFICIENCY: ICD-10-CM

## 2022-09-13 DIAGNOSIS — I10 HYPERTENSION, BENIGN: ICD-10-CM

## 2022-09-13 DIAGNOSIS — Z51.81 THERAPEUTIC DRUG MONITORING: Primary | ICD-10-CM

## 2022-09-13 DIAGNOSIS — F41.9 ANXIETY AND DEPRESSION: ICD-10-CM

## 2022-09-13 DIAGNOSIS — E78.00 PURE HYPERCHOLESTEROLEMIA: ICD-10-CM

## 2022-09-13 DIAGNOSIS — R73.03 PREDIABETES: ICD-10-CM

## 2022-09-13 DIAGNOSIS — F32.A ANXIETY AND DEPRESSION: ICD-10-CM

## 2022-09-13 DIAGNOSIS — E66.9 OBESITY WITH BODY MASS INDEX (BMI) OF 30.0 TO 39.9: ICD-10-CM

## 2022-09-13 DIAGNOSIS — E03.9 HYPOTHYROIDISM (ACQUIRED): ICD-10-CM

## 2022-09-13 PROCEDURE — 99214 OFFICE O/P EST MOD 30 MIN: CPT | Performed by: NURSE PRACTITIONER

## 2022-09-13 PROCEDURE — 3079F DIAST BP 80-89 MM HG: CPT | Performed by: NURSE PRACTITIONER

## 2022-09-13 PROCEDURE — 3074F SYST BP LT 130 MM HG: CPT | Performed by: NURSE PRACTITIONER

## 2022-09-13 PROCEDURE — 3008F BODY MASS INDEX DOCD: CPT | Performed by: NURSE PRACTITIONER

## 2022-09-13 RX ORDER — TIRZEPATIDE 2.5 MG/.5ML
2.5 INJECTION, SOLUTION SUBCUTANEOUS WEEKLY
Qty: 2 ML | Refills: 0 | Status: SHIPPED | OUTPATIENT
Start: 2022-09-13

## 2022-09-13 NOTE — PATIENT INSTRUCTIONS
We are here to support you with weight loss, but please remember that you still need your primary care provider for your routine health maintenance. PLAN:  Will continue with mounjaro 2.5mg weekly and phentermine 37.5mg   Follow up with me in 8-12 weeks  Schedule follow up appointments: Leonard Diaz (dietitian) or Sean Nelson (presurgery dietitian)   Check for insurance coverage for dietitian and labwork prior to scheduling appointment. Please try to work on the following dietary changes:  1. Goals: Aim for 20-30 grams of protein/ meal  i. Aim for 100 grams of carbohydrates/day  ii. Eat 4-6 vegetables/day  iii. Avoid skipping meals- eat every 4-5 hours  iv. Aim for 3 meals/day  2. Drink lots of water and cut down on soda/juice consumption if soda/juice drinker  3. Focus on protein: (15-30 grams with each meal) ie. greek yogurt, cottage cheese, string cheese, hard boiled eggs  4. Healthy snacks: peanut butter and apples, hummus and carrots, berries, nuts (1/4 cup), tuna and crackers                 Protein Shakes: Premier protein or Core Power                Protein Bars: Rx Bars, Oatmega, Power Crunch                 S[employer]ento balanced breaks (cheese and nuts)- without chocolate  5. Reduce carbohydrates which includes sweets as well as rice, pasta, potatoes, bread, corn and instead choose whole grain options or more protein or vegetables (4-6 servings of vegetables per day)  6. Get a good night of sleep  7. Try to decrease stress in life     Please download apps:  1. \"My Fitness Pal\" (other option is Lose it)) to help you to monitor daily dietary intake and you will be able to see if you are eating the right amount of calories, protein, carbs                With My Fitness Pal-->When you set-up the mary or need to adjust settings:                Goals should include:                  Lose 1.5-2 lbs per week                Activity level: not very active (can't count exercise towards calorie number per day) ** Daily INPUT> Look at nutrition section-- \"nutrients\" and it will break down your macros for the day (ie. Protein, carbs, fibers, sugars and fats). Try to stay within these numbers daily     2. \"7 minute workout\" to help with exercise/activity which takes 7 minutes of your day and that you can do at home! 3. \"Calm\" or \"Headspace\" which helps with mindfulness, meditation, clarity, sleep, and shahzad to your daily life. 4. [employer] blog for healthy recipe ideas  5. [employer] for low carb resources    HIGH PROTEIN SNACK IDEAS  -cottage cheese  -plain yogurt  -kefir  -hard-boiled eggs  -natural cheeses  -nuts (measure portion size)   -unsweetened nut butters  -dried edamame   -elvin seeds soaked in water or almond milk  -soy nuts  -cured meats (monitor for sodium issues)   -hummus with vegetables  -bean dip with vegetables     FRUIT  Low carb fruit options   Raspberries: Half a cup (60 grams) contains 3 grams of carbs. Blackberries: Half a cup (70 grams) contains 4 grams of carbs. Strawberries: Half a cup (100 grams) contains 6 grams of carbs. Blueberries: Half a cup (50 grams) contains 6 grams of carbs. Plum: One medium-sized (80 grams) contains 6 grams of carbs.      VEGETABLES  Low carb vegetables

## 2022-09-14 ENCOUNTER — TELEPHONE (OUTPATIENT)
Dept: INTERNAL MEDICINE CLINIC | Facility: CLINIC | Age: 62
End: 2022-09-14

## 2022-09-14 NOTE — TELEPHONE ENCOUNTER
PA requested for [facility]  Patient is not diabetic - needs to use coupon - my chart sent to patient.

## 2022-10-14 DIAGNOSIS — Z51.81 THERAPEUTIC DRUG MONITORING: ICD-10-CM

## 2022-10-14 DIAGNOSIS — E66.9 OBESITY WITH BODY MASS INDEX (BMI) OF 30.0 TO 39.9: ICD-10-CM

## 2022-10-16 RX ORDER — TIRZEPATIDE 5 MG/.5ML
5 INJECTION, SOLUTION SUBCUTANEOUS WEEKLY
Qty: 2 ML | Refills: 0 | Status: SHIPPED | OUTPATIENT
Start: 2022-10-16

## 2022-10-16 RX ORDER — TIRZEPATIDE 2.5 MG/.5ML
INJECTION, SOLUTION SUBCUTANEOUS
Qty: 2 ML | Refills: 0 | OUTPATIENT
Start: 2022-10-16

## 2022-10-16 NOTE — TELEPHONE ENCOUNTER
Requesting Mounjaro  LOV: 9/13/22  RTC: 10 weeks  Last Relevant Labs: 5/20/22  Filled: 9/13/22 #2ml with 0 refills  Mounjaro 2.5 mg    Future Appointments   Date Time Provider Manolo Shelby   10/25/2022 12:40 PM Ollie Primrose, APRN [facility] [address]

## 2022-10-24 ENCOUNTER — HOSPITAL ENCOUNTER (OUTPATIENT)
Age: 62
Discharge: HOME | End: 2022-10-24
Payer: COMMERCIAL

## 2022-10-24 ENCOUNTER — PATIENT MESSAGE (OUTPATIENT)
Dept: FAMILY MEDICINE CLINIC | Facility: CLINIC | Age: 62
End: 2022-10-24

## 2022-10-24 VITALS
HEIGHT: 67 IN | WEIGHT: 192 LBS | SYSTOLIC BLOOD PRESSURE: 145 MMHG | RESPIRATION RATE: 18 BRPM | DIASTOLIC BLOOD PRESSURE: 82 MMHG | BODY MASS INDEX: 30.13 KG/M2 | HEART RATE: 84 BPM | OXYGEN SATURATION: 99 % | TEMPERATURE: 97 F

## 2022-10-24 DIAGNOSIS — S39.012A STRAIN OF LUMBAR PARASPINAL MUSCLE, INITIAL ENCOUNTER: Primary | ICD-10-CM

## 2022-10-24 PROCEDURE — 99213 OFFICE O/P EST LOW 20 MIN: CPT

## 2022-10-24 PROCEDURE — 99214 OFFICE O/P EST MOD 30 MIN: CPT

## 2022-10-24 PROCEDURE — 96372 THER/PROPH/DIAG INJ SC/IM: CPT

## 2022-10-24 RX ORDER — KETOROLAC TROMETHAMINE 30 MG/ML
60 INJECTION, SOLUTION INTRAMUSCULAR; INTRAVENOUS ONCE
Status: COMPLETED | OUTPATIENT
Start: 2022-10-24 | End: 2022-10-24

## 2022-10-24 RX ORDER — IBUPROFEN 600 MG/1
600 TABLET ORAL EVERY 8 HOURS PRN
Qty: 30 TABLET | Refills: 0 | Status: SHIPPED | OUTPATIENT
Start: 2022-10-24 | End: 2022-10-31

## 2022-10-24 RX ORDER — CYCLOBENZAPRINE HCL 10 MG
10 TABLET ORAL 3 TIMES DAILY PRN
Qty: 20 TABLET | Refills: 0 | Status: SHIPPED | OUTPATIENT
Start: 2022-10-24 | End: 2022-10-31

## 2022-10-24 NOTE — ED INITIAL ASSESSMENT (HPI)
C/o right mid back pain on and off for a week but more constant for 2 days. No injury. Hx of back surgery L4-L5. Denies urinary symptoms. Emmy Yu(Attending)

## 2022-10-25 ENCOUNTER — OFFICE VISIT (OUTPATIENT)
Dept: INTERNAL MEDICINE CLINIC | Facility: CLINIC | Age: 62
End: 2022-10-25
Payer: COMMERCIAL

## 2022-10-25 VITALS
WEIGHT: 195 LBS | BODY MASS INDEX: 30.25 KG/M2 | SYSTOLIC BLOOD PRESSURE: 120 MMHG | RESPIRATION RATE: 14 BRPM | HEART RATE: 70 BPM | HEIGHT: 67.5 IN | DIASTOLIC BLOOD PRESSURE: 70 MMHG

## 2022-10-25 DIAGNOSIS — E78.00 PURE HYPERCHOLESTEROLEMIA: ICD-10-CM

## 2022-10-25 DIAGNOSIS — R73.03 PREDIABETES: ICD-10-CM

## 2022-10-25 DIAGNOSIS — E55.9 VITAMIN D DEFICIENCY: ICD-10-CM

## 2022-10-25 DIAGNOSIS — F41.9 ANXIETY AND DEPRESSION: ICD-10-CM

## 2022-10-25 DIAGNOSIS — E03.9 HYPOTHYROIDISM (ACQUIRED): ICD-10-CM

## 2022-10-25 DIAGNOSIS — I10 HYPERTENSION, BENIGN: ICD-10-CM

## 2022-10-25 DIAGNOSIS — F32.A ANXIETY AND DEPRESSION: ICD-10-CM

## 2022-10-25 DIAGNOSIS — E66.9 OBESITY WITH BODY MASS INDEX (BMI) OF 30.0 TO 39.9: ICD-10-CM

## 2022-10-25 DIAGNOSIS — Z51.81 THERAPEUTIC DRUG MONITORING: Primary | ICD-10-CM

## 2022-10-25 PROCEDURE — 3074F SYST BP LT 130 MM HG: CPT | Performed by: NURSE PRACTITIONER

## 2022-10-25 PROCEDURE — 3008F BODY MASS INDEX DOCD: CPT | Performed by: NURSE PRACTITIONER

## 2022-10-25 PROCEDURE — 3078F DIAST BP <80 MM HG: CPT | Performed by: NURSE PRACTITIONER

## 2022-10-25 PROCEDURE — 99213 OFFICE O/P EST LOW 20 MIN: CPT | Performed by: NURSE PRACTITIONER

## 2022-10-25 RX ORDER — PHENTERMINE HYDROCHLORIDE 37.5 MG/1
37.5 TABLET ORAL
Qty: 90 TABLET | Refills: 0 | Status: SHIPPED | OUTPATIENT
Start: 2022-10-25

## 2022-11-01 ENCOUNTER — OFFICE VISIT (OUTPATIENT)
Dept: FAMILY MEDICINE CLINIC | Facility: CLINIC | Age: 62
End: 2022-11-01
Payer: COMMERCIAL

## 2022-11-01 VITALS
BODY MASS INDEX: 30.09 KG/M2 | HEART RATE: 80 BPM | DIASTOLIC BLOOD PRESSURE: 76 MMHG | WEIGHT: 194 LBS | TEMPERATURE: 98 F | HEIGHT: 67.5 IN | OXYGEN SATURATION: 99 % | RESPIRATION RATE: 18 BRPM | SYSTOLIC BLOOD PRESSURE: 130 MMHG

## 2022-11-01 DIAGNOSIS — E04.9 GOITER: ICD-10-CM

## 2022-11-01 DIAGNOSIS — R45.86 LABILE MOOD: ICD-10-CM

## 2022-11-01 DIAGNOSIS — Z01.419 WELL WOMAN EXAM WITH ROUTINE GYNECOLOGICAL EXAM: Primary | ICD-10-CM

## 2022-11-01 DIAGNOSIS — Z12.31 ENCOUNTER FOR SCREENING MAMMOGRAM FOR HIGH-RISK PATIENT: ICD-10-CM

## 2022-11-01 DIAGNOSIS — E03.9 HYPOTHYROIDISM (ACQUIRED): ICD-10-CM

## 2022-11-01 DIAGNOSIS — R73.03 PREDIABETES: ICD-10-CM

## 2022-11-01 DIAGNOSIS — Z00.00 ANNUAL PHYSICAL EXAM: ICD-10-CM

## 2022-11-01 PROCEDURE — 3075F SYST BP GE 130 - 139MM HG: CPT | Performed by: FAMILY MEDICINE

## 2022-11-01 PROCEDURE — 3008F BODY MASS INDEX DOCD: CPT | Performed by: FAMILY MEDICINE

## 2022-11-01 PROCEDURE — 3078F DIAST BP <80 MM HG: CPT | Performed by: FAMILY MEDICINE

## 2022-11-01 PROCEDURE — 99396 PREV VISIT EST AGE 40-64: CPT | Performed by: FAMILY MEDICINE

## 2022-11-01 PROCEDURE — 99213 OFFICE O/P EST LOW 20 MIN: CPT | Performed by: FAMILY MEDICINE

## 2022-11-01 RX ORDER — VENLAFAXINE HYDROCHLORIDE 75 MG/1
75 CAPSULE, EXTENDED RELEASE ORAL DAILY
Qty: 90 CAPSULE | Refills: 1 | Status: SHIPPED | OUTPATIENT
Start: 2022-11-01 | End: 2023-01-30

## 2022-11-01 RX ORDER — LEVOTHYROXINE SODIUM 88 UG/1
88 TABLET ORAL
Qty: 90 TABLET | Refills: 1 | Status: SHIPPED | OUTPATIENT
Start: 2022-11-01

## 2022-11-04 ENCOUNTER — HOSPITAL ENCOUNTER (OUTPATIENT)
Dept: ULTRASOUND IMAGING | Age: 62
Discharge: HOME | End: 2022-11-04
Attending: FAMILY MEDICINE
Payer: COMMERCIAL

## 2022-11-04 DIAGNOSIS — E04.9 GOITER: ICD-10-CM

## 2022-11-04 PROCEDURE — 76536 US EXAM OF HEAD AND NECK: CPT | Performed by: FAMILY MEDICINE

## 2022-11-20 NOTE — TELEPHONE ENCOUNTER
Requesting Mounjaro  LOV: 10/25/22  RTC: 6 weeks  Last Relevant Labs: 5/20/22  Filled: 10/16/22 #2ml with 0 refills  Mounjaro 5 mg    Future Appointments   Date Time Provider Manolo Shelby   12/7/2022 11:20 AM VIMAL Vega [facility]IA[facility] [address]   1/16/2023 11:00 AM CURT Husain Vimal [address]

## 2022-11-21 ENCOUNTER — LAB ENCOUNTER (OUTPATIENT)
Dept: LAB | Age: 62
End: 2022-11-21
Attending: FAMILY MEDICINE
Payer: COMMERCIAL

## 2022-11-21 ENCOUNTER — LAB ENCOUNTER (OUTPATIENT)
Dept: LAB | Age: 62
End: 2022-11-21
Attending: INTERNAL MEDICINE
Payer: COMMERCIAL

## 2022-11-21 DIAGNOSIS — Z00.00 GENERAL MEDICAL EXAM: ICD-10-CM

## 2022-11-21 LAB
ALBUMIN SERPL-MCNC: 3.8 G/DL (ref 3.4–5)
ALBUMIN/GLOB SERPL: 1.2 {RATIO} (ref 1–2)
ALP LIVER SERPL-CCNC: 78 U/L
ALT SERPL-CCNC: 23 U/L
ANION GAP SERPL CALC-SCNC: 8 MMOL/L (ref 0–18)
AST SERPL-CCNC: 17 U/L (ref 15–37)
BASOPHILS # BLD AUTO: 0.08 X10(3) UL (ref 0–0.2)
BASOPHILS NFR BLD AUTO: 1.1 %
BILIRUB SERPL-MCNC: 0.5 MG/DL (ref 0.1–2)
BUN BLD-MCNC: 17 MG/DL (ref 7–18)
CALCIUM BLD-MCNC: 9.5 MG/DL (ref 8.5–10.1)
CHLORIDE SERPL-SCNC: 106 MMOL/L (ref 98–112)
CHOLEST SERPL-MCNC: 105 MG/DL (ref ?–200)
CO2 SERPL-SCNC: 27 MMOL/L (ref 21–32)
CREAT BLD-MCNC: 0.77 MG/DL
EOSINOPHIL # BLD AUTO: 0.65 X10(3) UL (ref 0–0.7)
EOSINOPHIL NFR BLD AUTO: 9.1 %
ERYTHROCYTE [DISTWIDTH] IN BLOOD BY AUTOMATED COUNT: 12.7 %
EST. AVERAGE GLUCOSE BLD GHB EST-MCNC: 111 MG/DL (ref 68–126)
FASTING PATIENT LIPID ANSWER: YES
FASTING STATUS PATIENT QL REPORTED: YES
GFR SERPLBLD BASED ON 1.73 SQ M-ARVRAT: 87 ML/MIN/1.73M2 (ref 60–?)
GLOBULIN PLAS-MCNC: 3.2 G/DL (ref 2.8–4.4)
GLUCOSE BLD-MCNC: 89 MG/DL (ref 70–99)
HBA1C MFR BLD: 5.5 % (ref ?–5.7)
HCT VFR BLD AUTO: 43.5 %
HDLC SERPL-MCNC: 56 MG/DL (ref 40–59)
HGB BLD-MCNC: 14.7 G/DL
IMM GRANULOCYTES # BLD AUTO: 0.02 X10(3) UL (ref 0–1)
IMM GRANULOCYTES NFR BLD: 0.3 %
LDLC SERPL CALC-MCNC: 31 MG/DL (ref ?–100)
LYMPHOCYTES # BLD AUTO: 1.78 X10(3) UL (ref 1–4)
LYMPHOCYTES NFR BLD AUTO: 24.9 %
MCH RBC QN AUTO: 30.2 PG (ref 26–34)
MCHC RBC AUTO-ENTMCNC: 33.8 G/DL (ref 31–37)
MCV RBC AUTO: 89.3 FL
MONOCYTES # BLD AUTO: 0.38 X10(3) UL (ref 0.1–1)
MONOCYTES NFR BLD AUTO: 5.3 %
NEUTROPHILS # BLD AUTO: 4.23 X10 (3) UL (ref 1.5–7.7)
NEUTROPHILS # BLD AUTO: 4.23 X10(3) UL (ref 1.5–7.7)
NEUTROPHILS NFR BLD AUTO: 59.3 %
NONHDLC SERPL-MCNC: 49 MG/DL (ref ?–130)
OSMOLALITY SERPL CALC.SUM OF ELEC: 293 MOSM/KG (ref 275–295)
PLATELET # BLD AUTO: 269 10(3)UL (ref 150–450)
POTASSIUM SERPL-SCNC: 3.5 MMOL/L (ref 3.5–5.1)
PROT SERPL-MCNC: 7 G/DL (ref 6.4–8.2)
RBC # BLD AUTO: 4.87 X10(6)UL
SODIUM SERPL-SCNC: 141 MMOL/L (ref 136–145)
T4 FREE SERPL-MCNC: 1.4 NG/DL (ref 0.8–1.7)
TRIGL SERPL-MCNC: 92 MG/DL (ref 30–149)
TSI SER-ACNC: 2.31 MIU/ML (ref 0.36–3.74)
VIT B12 SERPL-MCNC: 508 PG/ML (ref 193–986)
VIT D+METAB SERPL-MCNC: 58.5 NG/ML (ref 30–100)
VLDLC SERPL CALC-MCNC: 12 MG/DL (ref 0–30)
WBC # BLD AUTO: 7.1 X10(3) UL (ref 4–11)

## 2022-11-21 PROCEDURE — 82306 VITAMIN D 25 HYDROXY: CPT | Performed by: FAMILY MEDICINE

## 2022-11-21 PROCEDURE — 83036 HEMOGLOBIN GLYCOSYLATED A1C: CPT | Performed by: FAMILY MEDICINE

## 2022-11-21 PROCEDURE — 80050 GENERAL HEALTH PANEL: CPT | Performed by: FAMILY MEDICINE

## 2022-11-21 PROCEDURE — 84439 ASSAY OF FREE THYROXINE: CPT | Performed by: FAMILY MEDICINE

## 2022-11-21 PROCEDURE — 80061 LIPID PANEL: CPT | Performed by: FAMILY MEDICINE

## 2022-11-21 PROCEDURE — 82607 VITAMIN B-12: CPT | Performed by: FAMILY MEDICINE

## 2022-11-22 RX ORDER — TIRZEPATIDE 5 MG/.5ML
5 INJECTION, SOLUTION SUBCUTANEOUS WEEKLY
Qty: 2 ML | Refills: 0 | Status: SHIPPED | OUTPATIENT
Start: 2022-11-22

## 2022-11-26 ENCOUNTER — PATIENT MESSAGE (OUTPATIENT)
Dept: FAMILY MEDICINE CLINIC | Facility: CLINIC | Age: 62
End: 2022-11-26

## 2022-11-26 DIAGNOSIS — R73.03 PREDIABETES: ICD-10-CM

## 2022-11-26 DIAGNOSIS — E66.9 OBESITY WITH BODY MASS INDEX (BMI) OF 30.0 TO 39.9: ICD-10-CM

## 2022-11-26 DIAGNOSIS — Z51.81 THERAPEUTIC DRUG MONITORING: ICD-10-CM

## 2022-11-26 DIAGNOSIS — R45.86 LABILE MOOD: ICD-10-CM

## 2022-11-26 DIAGNOSIS — E03.9 HYPOTHYROIDISM (ACQUIRED): ICD-10-CM

## 2022-11-28 NOTE — TELEPHONE ENCOUNTER
Please approve/deny rx's pt had to go to [facility] for a family emergency and ran out of med she brought with.

## 2022-11-29 DIAGNOSIS — Z51.81 THERAPEUTIC DRUG MONITORING: ICD-10-CM

## 2022-11-29 DIAGNOSIS — R73.03 PREDIABETES: ICD-10-CM

## 2022-11-29 RX ORDER — VENLAFAXINE HYDROCHLORIDE 75 MG/1
75 CAPSULE, EXTENDED RELEASE ORAL DAILY
Qty: 4 CAPSULE | Refills: 0 | Status: SHIPPED | OUTPATIENT
Start: 2022-11-29 | End: 2022-12-03

## 2022-11-29 RX ORDER — PHENTERMINE HYDROCHLORIDE 37.5 MG/1
37.5 TABLET ORAL
Qty: 4 TABLET | Refills: 0 | Status: SHIPPED | OUTPATIENT
Start: 2022-11-29

## 2022-11-29 RX ORDER — LEVOTHYROXINE SODIUM 88 UG/1
88 TABLET ORAL
Qty: 8 TABLET | Refills: 0 | Status: SHIPPED | OUTPATIENT
Start: 2022-11-29

## 2022-11-30 ENCOUNTER — PATIENT MESSAGE (OUTPATIENT)
Dept: FAMILY MEDICINE CLINIC | Facility: CLINIC | Age: 62
End: 2022-11-30

## 2022-12-07 ENCOUNTER — OFFICE VISIT (OUTPATIENT)
Dept: INTERNAL MEDICINE CLINIC | Facility: CLINIC | Age: 62
End: 2022-12-07
Payer: COMMERCIAL

## 2022-12-07 VITALS
DIASTOLIC BLOOD PRESSURE: 84 MMHG | BODY MASS INDEX: 28.23 KG/M2 | OXYGEN SATURATION: 97 % | HEART RATE: 73 BPM | RESPIRATION RATE: 16 BRPM | HEIGHT: 67.5 IN | SYSTOLIC BLOOD PRESSURE: 118 MMHG | WEIGHT: 182 LBS

## 2022-12-07 DIAGNOSIS — F32.A ANXIETY AND DEPRESSION: ICD-10-CM

## 2022-12-07 DIAGNOSIS — F41.9 ANXIETY AND DEPRESSION: ICD-10-CM

## 2022-12-07 DIAGNOSIS — E66.3 OVERWEIGHT (BMI 25.0-29.9): ICD-10-CM

## 2022-12-07 DIAGNOSIS — R73.03 PREDIABETES: ICD-10-CM

## 2022-12-07 DIAGNOSIS — Z51.81 THERAPEUTIC DRUG MONITORING: Primary | ICD-10-CM

## 2022-12-07 DIAGNOSIS — I10 HYPERTENSION, BENIGN: ICD-10-CM

## 2022-12-07 DIAGNOSIS — E55.9 VITAMIN D DEFICIENCY: ICD-10-CM

## 2022-12-07 DIAGNOSIS — E78.00 PURE HYPERCHOLESTEROLEMIA: ICD-10-CM

## 2022-12-07 PROCEDURE — 3008F BODY MASS INDEX DOCD: CPT | Performed by: NURSE PRACTITIONER

## 2022-12-07 PROCEDURE — 3074F SYST BP LT 130 MM HG: CPT | Performed by: NURSE PRACTITIONER

## 2022-12-07 PROCEDURE — 99213 OFFICE O/P EST LOW 20 MIN: CPT | Performed by: NURSE PRACTITIONER

## 2022-12-07 PROCEDURE — 3079F DIAST BP 80-89 MM HG: CPT | Performed by: NURSE PRACTITIONER

## 2022-12-07 NOTE — PATIENT INSTRUCTIONS
Next steps:  1. Fill your prescribed medication and take as discussed and prescribed: mounjaro 5mg weekly and phentermine   2. Schedule a personal nutrition consultation with one of our registered dieticians     Please try to work on the following dietary changes:    1. Drink water with meals and throughout the day, cut down on soda and/or juice if consumed. Consider flavored water options like Bubbly, Spindrift, Hint and Efrain. 2.  Eat breakfast daily and focus on having protein with each meal, examples include: greek yogurt, cottage cheese, hard boiled egg, whole grain toast with peanut butter. 3.  Reduce refined carbohydrates and sugars which includes items such as sweets, as well as rice, pasta, and bread and make sure to choose whole grain options when having them with just 1 serving per meal about the size of your inner palm. 4.  Consume non starchy veggies daily working towards making them a good 50% of your daily food intake. Add them to lunch and dinner consistently. 5.  Start a daily probiotic: VSL#3 is recommended, (order on line at www.vsl3. com). Take 1 capsule daily with water for 30 days, then reduce to 1 every other day (this will reduce the cost). Capsules can be left out for 2 weeks, but then must be refrigerated. Please download mary My Fitness Jordan Combs! Or Net Diary to monitor daily dietary intake and you will be able to see if you are eating the right amount of calories or too much or too little which would hinder weight loss. Additionally this will help to see your daily carbohydrate and protein intake. When you set the mary up choose 1-2 lbs/week as a goal.  Keeping a paper food journal is an option as well to remain accountable for your choices- this is the start to mindful eating! A low calorie diet has been consistently shown to support weight loss. Continue or start exercising to help establish a routine.  If not already exercising begin with 1 day and progress as able with long-term goal of 30 minutes 5 days a week at a minimum. Meditation daily can help manage and control stress. Chronic stress can make weight loss difficult. Exercising is one way to help with stress, but meditation using the CALM Trace or another comparable alternative can be done in your home or place of work with little time commitment. This Trace can also help work on behavior change and improve sleep. Check out the segment under Calm Masterclass and listen to The 4 Pillars of Health. A great way to begin learning about the foundation of lifestyle with practical tips to use in your every day. Check out www.yourweightmatters. org blog for continued daily support and education along this weight loss journey! Patient Resources:     Personal Training/Fitness Classes/Health Coaching     Clara Davis and Elizabeth Sophiaside @ [email or web address]/ Full fitness center with group fitness and personal training. Discount available as client of [facility]. Health Coaching and Personal Training with Betty Owens at our [facility]- individual weekly coaching with option to add personal training and small group fitness classes targeted at weight loss- [phone] and/or email @ Dior [email or web address]@"[email or web address][employer] [email or web address].". org  360FIT [place] [email or web address] Group Fitness [phone] and/or email Dorothy Marinelli at [email or web address]. Sensorflare PC  [address] [facility] with multiple locations: Aetna (www.TrustPoint International. Sensorflare PC), Eat The Rock Flow Dynamics Fitness (www.Cox Communications. Sensorflare PC), Fit Body Bootcamp (www.Accerabodybootcamp.Sensorflare PC), IOCOM (www.uberall. Sensorflare PC), The Exercise  (www.exercisecoach.Sensorflare PC)     Online Fitness  Fitness  on Whole Foods in 10 DVD series- www. hilfe42SSR. Sensorflare PC  Sit and Be Fit - Chair exercise series Www.sitandbefit. org  Hip Hop Fit with James Melendez at www.hiphopfit. net     Apps for on the [employer] 7 Minute Workout (orange box with white 7) - free on the go HIIT training trace  Peloton Trace @ www. Harlyn Medical     Nutrition Trackers and Tools  LoseIT! And My Fitness Pal apps and on line for tracking nutrition  NOOM - virtual health coaching  FitFoundation (healthy meals on the go) in [employer] @ www. uxblophmphwne3e. Sissy Frye MD @ www.Easy VinotromdNovelos Therapeutics and Rei Newman (keto and low carb plans recommended) @ www. HGTVWC67.YWC, Metabolic Meals @ www. MyMetabolicMeals. com - individual prepared meals to go  Ruxter, Wedit, International Business Machines, Every Plate, G[employer] Mangement [employer]- on line meal delivery programs for preparation at home  [place] Puzzlium in [place] for homemade meals to go @ wwwMidfin Systems  Diet Doctor @ www. dietdoctor. [place] - low carb swaps  tenXer - meal prep and planning trace (www.yummly. com)     Stress Management/Behavior/Mindful Eating  CALM meditation trace (www.Canvace)  Headspace  Am I Hungry? Mindful eating virtual  trace  Www.yourweightmatters. org - Obesity Action Coalition sponsored Blog posts daily  Motivation trace (black box with white \")- daily supportive messages sent to your phone     Books/Video Education/Podcasts  Mindless Eating by Roby Drew  Why We Get Sick by Mitzi Araya (a book about insulin resistance)  Atomic Habits by Kaity Castle (a book about taking small steps to promote greater behavior change)   Can't Hurt Me by Beverly Parada (a book exploring the power of discipline in achieving your goals)  The End of Dieting: How to Live for Life by Dr. Tg Torres M.D. or listen to The [address] Episode 61: Understanding \"Nutritarian\" Eating w/Dr. Tg Torres  Your Body in Balance: The World [employer] Services Corporation of Food, Hormones, and Health by Dr. Tianna Ortega  The Menopause Diet Plan by Arian Kang and [facility] AT [facility]  The Complete Guide to fasting by Dr. Cohn July, [address] [facility] by Cindy Santo, Ph.D, R.D.   Weight Loss Surgery Will Not Treat Food Addiction by Tristin Troy Ph.D  The Game Changers- Netflix Documentary on plant based nutrition  Fed Up - documentary about obesity (Free on Utube)  The Truth About Sugar - documentary on sugar (Free on Utube, https://youFonJaxu. be/5L3zybeJJ8q)  The Dr. Diana rIwin by Dr. Moe Fried MD  Fitlosophy Fitspiration - journal to better health (found at [employer] in fitness aisle)  What Happened to You?- a look at the impact trauma has on behavior written by Marisol Weldon and Dr. Ivelisse Ball Again by Suzanne Bragg - discovering your true self after trauma  Nathalie Brambila talk on Netflix, The Call to Courage  Podcasts: The Exam Room by the Physician's Committee, Nutrition Facts by Dr. Brittany Wisdom    We are here to support you with weight loss, but please remember that you still need your primary care provider for your routine health maintenance.

## 2022-12-15 RX ORDER — TIRZEPATIDE 5 MG/.5ML
5 INJECTION, SOLUTION SUBCUTANEOUS WEEKLY
Qty: 2 ML | Refills: 0 | Status: SHIPPED | OUTPATIENT
Start: 2022-12-15

## 2023-01-16 ENCOUNTER — HOSPITAL ENCOUNTER (OUTPATIENT)
Dept: MAMMOGRAPHY | Age: 63
Discharge: HOME | End: 2023-01-16
Attending: FAMILY MEDICINE
Payer: COMMERCIAL

## 2023-01-16 DIAGNOSIS — Z12.31 ENCOUNTER FOR SCREENING MAMMOGRAM FOR HIGH-RISK PATIENT: ICD-10-CM

## 2023-01-16 PROCEDURE — 77067 SCR MAMMO BI INCL CAD: CPT | Performed by: FAMILY MEDICINE

## 2023-01-16 PROCEDURE — 77063 BREAST TOMOSYNTHESIS BI: CPT | Performed by: FAMILY MEDICINE

## 2023-01-20 RX ORDER — TIRZEPATIDE 7.5 MG/.5ML
7.5 INJECTION, SOLUTION SUBCUTANEOUS WEEKLY
Qty: 2 ML | Refills: 0 | Status: SHIPPED | OUTPATIENT
Start: 2023-01-20

## 2023-01-20 RX ORDER — TIRZEPATIDE 5 MG/.5ML
INJECTION, SOLUTION SUBCUTANEOUS
Refills: 0 | OUTPATIENT
Start: 2023-01-20

## 2023-01-20 NOTE — TELEPHONE ENCOUNTER
Requesting Mounjaro increase  LOV: 12/7/22  RTC: 8 weeks  Last Relevant Labs: 11/21/22  Filled: 12/21/22 #2ml with 0 refills  Mounjaro 5 mg    Future Appointments   Date Time Provider Manolo Shelby   1/31/2023 11:20 AM Cleopatra Brittle, Karen Irani, APRN EMGWEI [facility]

## 2023-01-31 ENCOUNTER — OFFICE VISIT (OUTPATIENT)
Dept: INTERNAL MEDICINE CLINIC | Facility: CLINIC | Age: 63
End: 2023-01-31
Payer: COMMERCIAL

## 2023-01-31 VITALS
BODY MASS INDEX: 28.08 KG/M2 | DIASTOLIC BLOOD PRESSURE: 74 MMHG | HEIGHT: 67.5 IN | WEIGHT: 181 LBS | SYSTOLIC BLOOD PRESSURE: 114 MMHG | HEART RATE: 99 BPM | OXYGEN SATURATION: 99 % | RESPIRATION RATE: 16 BRPM

## 2023-01-31 DIAGNOSIS — E66.9 OBESITY WITH BODY MASS INDEX (BMI) OF 30.0 TO 39.9: ICD-10-CM

## 2023-01-31 DIAGNOSIS — I10 HYPERTENSION, BENIGN: ICD-10-CM

## 2023-01-31 DIAGNOSIS — E55.9 VITAMIN D DEFICIENCY: ICD-10-CM

## 2023-01-31 DIAGNOSIS — E66.3 OVERWEIGHT (BMI 25.0-29.9): ICD-10-CM

## 2023-01-31 DIAGNOSIS — F32.A ANXIETY AND DEPRESSION: ICD-10-CM

## 2023-01-31 DIAGNOSIS — F41.9 ANXIETY AND DEPRESSION: ICD-10-CM

## 2023-01-31 DIAGNOSIS — R73.03 PREDIABETES: ICD-10-CM

## 2023-01-31 DIAGNOSIS — E03.9 HYPOTHYROIDISM (ACQUIRED): ICD-10-CM

## 2023-01-31 DIAGNOSIS — Z51.81 THERAPEUTIC DRUG MONITORING: Primary | ICD-10-CM

## 2023-01-31 DIAGNOSIS — E78.00 PURE HYPERCHOLESTEROLEMIA: ICD-10-CM

## 2023-01-31 PROCEDURE — 3074F SYST BP LT 130 MM HG: CPT | Performed by: NURSE PRACTITIONER

## 2023-01-31 PROCEDURE — 99214 OFFICE O/P EST MOD 30 MIN: CPT | Performed by: NURSE PRACTITIONER

## 2023-01-31 PROCEDURE — 3078F DIAST BP <80 MM HG: CPT | Performed by: NURSE PRACTITIONER

## 2023-01-31 PROCEDURE — 3008F BODY MASS INDEX DOCD: CPT | Performed by: NURSE PRACTITIONER

## 2023-01-31 RX ORDER — PHENTERMINE HYDROCHLORIDE 37.5 MG/1
37.5 TABLET ORAL
Qty: 90 TABLET | Refills: 0 | Status: SHIPPED | OUTPATIENT
Start: 2023-01-31

## 2023-01-31 RX ORDER — VENLAFAXINE HYDROCHLORIDE 75 MG/1
75 CAPSULE, EXTENDED RELEASE ORAL EVERY MORNING
COMMUNITY
Start: 2023-01-26

## 2023-01-31 NOTE — PATIENT INSTRUCTIONS
Next steps:  1. Fill your prescribed medication and take as discussed and prescribed: mounjaro 7.5mg weekly and phentermine  2. Schedule a personal nutrition consultation with one of our registered dieticians     Please try to work on the following dietary changes:    1. Drink water with meals and throughout the day, cut down on soda and/or juice if consumed. Consider flavored water options like Bubbly, Spindrift, Hint and Efrain. 2.  Eat breakfast daily and focus on having protein with each meal, examples include: greek yogurt, cottage cheese, hard boiled egg, whole grain toast with peanut butter. 3.  Reduce refined carbohydrates and sugars which includes items such as sweets, as well as rice, pasta, and bread and make sure to choose whole grain options when having them with just 1 serving per meal about the size of your inner palm. 4.  Consume non starchy veggies daily working towards making them a good 50% of your daily food intake. Add them to lunch and dinner consistently. 5.  Start a daily probiotic: VSL#3 is recommended, (order on line at www.vsl3. com). Take 1 capsule daily with water for 30 days, then reduce to 1 every other day (this will reduce the cost). Capsules can be left out for 2 weeks, but then must be refrigerated. Please download mary My Fitness Dea Erickson! Or Net Diary to monitor daily dietary intake and you will be able to see if you are eating the right amount of calories or too much or too little which would hinder weight loss. Additionally this will help to see your daily carbohydrate and protein intake. When you set the mary up choose 1-2 lbs/week as a goal.  Keeping a paper food journal is an option as well to remain accountable for your choices- this is the start to mindful eating! A low calorie diet has been consistently shown to support weight loss. Continue or start exercising to help establish a routine.  If not already exercising begin with 1 day and progress as able with long-term goal of 30 minutes 5 days a week at a minimum. Meditation daily can help manage and control stress. Chronic stress can make weight loss difficult. Exercising is one way to help with stress, but meditation using the CALM Trace or another comparable alternative can be done in your home or place of work with little time commitment. This Trace can also help work on behavior change and improve sleep. Check out the segment under Calm Masterclass and listen to The 4 Pillars of Health. A great way to begin learning about the foundation of lifestyle with practical tips to use in your every day. Check out www.yourweightmatters. org blog for continued daily support and education along this weight loss journey! Patient Resources:     Personal Training/Fitness Classes/Health Coaching     Clara Davis and Elizabeth Sophiaside @ [email or web address]/ Full fitness center with group fitness and personal training. Discount available as client of [facility]. Health Coaching and Personal Training with Key Mcnally at our [facility]- individual weekly coaching with option to add personal training and small group fitness classes targeted at weight loss- [phone] and/or email @ [email or web address]  360FIT [place] [email or web address] Group Fitness [phone] and/or email Serge Banks at [email or web address]. ZAINA PHARMA  [address] [facility] with multiple locations: Aetna (www.Blacksumac. ZAINA PHARMA), Eat The Wishbone.org Fitness (www.Coupons.com. ZAINA PHARMA), Fit Body Bootcamp (www.Qingguobodybootcamp.ZAINA PHARMA), Networked Insights Fitness (www.Quisk. ZAINA PHARMA), The Exercise  (www.exercisecoach.ZAINA PHARMA)     Online Fitness  Fitness  on Whole Foods in 10 DVD series- www. pqxmm02SMX. ZAINA PHARMA  Sit and Be Fit - Chair exercise series Www.sitandbefit. org  Hip Hop Fit with James Melendez at www.hiphopfit. net     Apps for on the Bank [employer] 7 Minute Workout (orange box with white 7) - free on the go HIIT training trace  Peloton Trace @ www. TheraBiologics     Nutrition Trackers and Tools  LoseIT! And My Fitness Pal apps and on line for tracking nutrition  NOOM - virtual health coaching  FitFoundation (healthy meals on the go) in [facility] @ www. igqaujfjslifd4i. Nichole Baer MD @ www.Adelphic MobiledQifang and Kirk Chris (keto and low carb plans recommended) @ www. MINXIY54.JUX, Metabolic Meals @ www. MyMetabolicMeals. com - individual prepared meals to go  [place], [place], [facility], Every [facility], SalesPredict- on line meal delivery programs for preparation at home  [place] Uniplaces in [place] for homemade meals to go @ wwwH&R Century  Diet Doctor @ www. dietdoctor. Dandelion - low carb swaps  YuIntegrated Plasmonics - meal prep and planning trace (www.yummly. com)     Stress Management/Behavior/Mindful Eating  CALM meditation trace (www.Gemino Healthcare Finance)  Headspace  Am I Hungry? Mindful eating virtual  trace  Www.yourweightmatters. org - Obesity Action Coalition sponsored Blog posts daily  Motivation trace (black box with white \")- daily supportive messages sent to your phone     Books/Video Education/Podcasts  Mindless Eating by Yesy Cortez  Why We Get Sick by Vlad Bray (a book about insulin resistance)  Atomic Habits by Lev Goel (a book about taking small steps to promote greater behavior change)   Can't Hurt Me by Monty Bobo (a book exploring the power of discipline in achieving your goals)  The End of Dieting: How to Live for Life by Dr. Hamida Malcolm M.D. or listen to The [address] [facility] Episode 61: Understanding \"Nutritarian\" Eating w/Dr. Hamida Malcolm  Your Body in Balance: The World Fuel Services Corporation of Food, Hormones, and Health by Dr. Deena Gage  The Menopause Diet Plan by Cruz Ayala and [facility]P AT [facility]  The Complete Guide to fasting by Dr. Jacquelin Alcala, [address] [facility] by Muriel Tsai, Ph.D, R.D.   Weight Loss Surgery Will Not Treat Food Addiction by Daralene Cushing Ph.D  The Game Changers- Netflix Documentary on plant based nutrition  Fed Up - documentary about obesity (Free on Utube)  The Truth About Sugar - documentary on sugar (Free on Utube, https://youtu. be/2Z7fmxpUL1p)  The Dr. Camron Nicholas by Dr. Bob Mccormick MD  Fitlosophy Fitspiration - journal to better health (found at [employer] in fitness aisle)  What Happened to You?- a look at the impact trauma has on behavior written by Janelle Elizabeth and Dr. Sabrina Novak Again by Felton Burton - discovering your true self after trauma  Ekaterina Wallace talk on Netflix, The Call to Courage  Podcasts: The Exam Room by the Physician's Committee, Nutrition Facts by Dr. Esther Gutierrez    We are here to support you with weight loss, but please remember that you still need your primary care provider for your routine health maintenance.

## 2023-02-19 RX ORDER — TIRZEPATIDE 7.5 MG/.5ML
INJECTION, SOLUTION SUBCUTANEOUS
Qty: 2 ML | Refills: 0 | Status: SHIPPED | OUTPATIENT
Start: 2023-02-19

## 2023-02-27 ENCOUNTER — PATIENT MESSAGE (OUTPATIENT)
Dept: FAMILY MEDICINE CLINIC | Facility: CLINIC | Age: 63
End: 2023-02-27

## 2023-03-02 ENCOUNTER — PATIENT MESSAGE (OUTPATIENT)
Dept: INTERNAL MEDICINE CLINIC | Facility: CLINIC | Age: 63
End: 2023-03-02

## 2023-03-03 RX ORDER — TIRZEPATIDE 5 MG/.5ML
5 INJECTION, SOLUTION SUBCUTANEOUS WEEKLY
Qty: 2 ML | Refills: 0 | Status: SHIPPED | OUTPATIENT
Start: 2023-03-03

## 2023-03-03 NOTE — TELEPHONE ENCOUNTER
Patient is unable to get 7.5 mg dose of mounjaro. She would like to resume 5 mg and that was sent today d/t problems with back order.

## 2023-04-04 ENCOUNTER — OFFICE VISIT (OUTPATIENT)
Dept: INTERNAL MEDICINE CLINIC | Facility: CLINIC | Age: 63
End: 2023-04-04
Payer: COMMERCIAL

## 2023-04-04 VITALS
SYSTOLIC BLOOD PRESSURE: 118 MMHG | WEIGHT: 177 LBS | DIASTOLIC BLOOD PRESSURE: 74 MMHG | OXYGEN SATURATION: 98 % | HEIGHT: 67.5 IN | HEART RATE: 88 BPM | BODY MASS INDEX: 27.46 KG/M2 | RESPIRATION RATE: 18 BRPM

## 2023-04-04 DIAGNOSIS — I10 HYPERTENSION, BENIGN: ICD-10-CM

## 2023-04-04 DIAGNOSIS — F32.A ANXIETY AND DEPRESSION: ICD-10-CM

## 2023-04-04 DIAGNOSIS — F41.9 ANXIETY AND DEPRESSION: ICD-10-CM

## 2023-04-04 DIAGNOSIS — E55.9 VITAMIN D DEFICIENCY: ICD-10-CM

## 2023-04-04 DIAGNOSIS — Z51.81 THERAPEUTIC DRUG MONITORING: Primary | ICD-10-CM

## 2023-04-04 DIAGNOSIS — R73.03 PREDIABETES: ICD-10-CM

## 2023-04-04 DIAGNOSIS — E66.3 OVERWEIGHT (BMI 25.0-29.9): ICD-10-CM

## 2023-04-04 DIAGNOSIS — E78.00 PURE HYPERCHOLESTEROLEMIA: ICD-10-CM

## 2023-04-04 DIAGNOSIS — E03.9 HYPOTHYROIDISM (ACQUIRED): ICD-10-CM

## 2023-04-04 PROCEDURE — 3008F BODY MASS INDEX DOCD: CPT | Performed by: NURSE PRACTITIONER

## 2023-04-04 PROCEDURE — 3078F DIAST BP <80 MM HG: CPT | Performed by: NURSE PRACTITIONER

## 2023-04-04 PROCEDURE — 3074F SYST BP LT 130 MM HG: CPT | Performed by: NURSE PRACTITIONER

## 2023-04-04 PROCEDURE — 99213 OFFICE O/P EST LOW 20 MIN: CPT | Performed by: NURSE PRACTITIONER

## 2023-04-04 NOTE — PATIENT INSTRUCTIONS
Next steps:  1. Fill your prescribed medication and take as discussed and prescribed: phentermine  Check to see if your insurance would cover wegovy  2. Schedule a personal nutrition consultation with one of our registered dieticians     1. Drink water with meals and throughout the day, cut down on soda and/or juice if consumed. Consider flavored water options like Bubbly, Spindrift, Hint and Efrain. 2.  Eat breakfast daily and focus on having protein with each meal, examples include: greek yogurt, cottage cheese, hard boiled egg, whole grain toast with peanut butter. 3.  Reduce refined carbohydrates and sugars which includes items such as sweets, as well as rice, pasta, and bread and make sure to choose whole grain options when having them with just 1 serving per meal about the size of your inner palm. 4.  Consume non starchy veggies daily working towards making them a good 50% of your daily food intake. Add them to lunch and dinner consistently. 5.  Start a daily probiotic: VSL#3 is recommended, (order on line at www.vsl3. com). Take 1 capsule daily with water for 30 days, then reduce to 1 every other day (this will reduce the cost). Capsules can be left out for 2 weeks, but then must be refrigerated. Please download mary My Fitness Xuan Bazan Or Net Diary to monitor daily dietary intake and you will be able to see if you are eating the right amount of calories or too much or too little which would hinder weight loss. Additionally this will help to see your daily carbohydrate and protein intake. When you set the mary up choose 1-2 lbs/week as a goal.  Keeping a paper food journal is an option as well to remain accountable for your choices- this is the start to mindful eating! A low calorie diet has been consistently shown to support weight loss. Continue or start exercising to help establish a routine.  If not already exercising begin with 1 day and progress as able with long-term goal of 30 minutes 5 days a week at a minimum. Meditation daily can help manage and control stress. Chronic stress can make weight loss difficult. Exercising is one way to help with stress, but meditation using the CALM Trace or another comparable alternative can be done in your home or place of work with little time commitment. This Trace can also help work on behavior change and improve sleep. Check out the segment under Calm Masterclass and listen to The 4 Pillars of Health. A great way to begin learning about the foundation of lifestyle with practical tips to use in your every day. Check out www.yourweightmatters. org blog for continued daily support and education along this weight loss journey! Patient Resources:     Personal Training/Fitness Classes/Health Coaching     The [facility] K[facility] and [place] @ [email or web address]/ Full fitness center with group fitness and personal training. Discount available as client of [facility]. Health Coaching and Personal Training with Catrachito White at our [facility]- individual weekly coaching with option to add personal training and small group fitness classes targeted at weight loss- [phone] and/or email @ [email or web address]  360FIT [place] [email or web address] Group Fitness [phone] and/or email Maico Young at [email or web address]  [address] [facility] with multiple locations: Aetna (www.Blend Labs), Eat The Extreme Reality Fitness (www.PayrollHero. Lightyear Network Solutions), Fit Body Bootcamp (www.Soldbodybootcamp.com), i-nexus (www.LiquidText. Lightyear Network Solutions), The Exercise  (www.exercisecoach.Lightyear Network Solutions)     Online Fitness  Fitness  on Whole Foods in 10 DVD series- www. qxwvm88HHQ. Lightyear Network Solutions  Sit and Be Fit - Chair exercise series Www.sitandbefit. org  Hip Hop Fit with James Melendez at www.hiphopfit. net     Apps for on the Player X 7 Minute Workout (orange box with white 7) - free on the go HIIT training trace  Peloton Trace @ www. ReadyDock     Nutrition Trackers and Tools  LoseIT! And My Fitness Pal apps and on line for tracking nutrition  NOOM - virtual health coaching  FitFoundation (healthy meals on the go) in [facility] Centera-SCI @ www. bdidbpyakwebw7d. Ja Dave MD @ www.Car reviewstromd.com and Scott Phan (keto and low carb plans recommended) @ www. ELIZABETH.Critical access hospital, Metabolic Meals @ www. [facility] and Life SciencesMetabolicMeals. com - individual prepared meals to go  "Zesty, Inc.", Priceline, International Business Machines, Every Plate, Building Robotics- on line meal delivery programs for preparation at home  [place] Sharingforce in [place] for homemade meals to go @ www.mealUbiquity Global Services. [place]  Diet Doctor @ www. dietdoctor. com - low carb swaps  YummMapori - meal prep and planning trace (www.yummly. com)     Stress Management/Behavior/Mindful Eating  CALM meditation trace (www.TerraEchos)  Headspace  Am I Hungry? Mindful eating virtual  trace  Www.yourweightmatters. org - Obesity Action Coalition sponsored Blog posts daily  Motivation trace (black box with white \")- daily supportive messages sent to your phone     Books/Video Education/Podcasts  Mindless Eating by Marilynn Cockayne  Why We Get Sick by Kayla Barker (a book about insulin resistance)  Atomic Habits by Savanna Ask (a book about taking small steps to promote greater behavior change)   Can't Hurt Me by Radha Nowak (a book exploring the power of discipline in achieving your goals)  The End of Dieting: How to Live for Life by Dr. Rojelio Jiang M.D. or listen to The [address] [facility] [address] Episode 61: Understanding \"Nutritarian\" Eating w/Dr. Rojelio Jiang  Your Body in Balance: The World Fuel Services Corporation of Food, Hormones, and Health by Dr. Ghislaine Vicente  The Menopause Diet Plan by Navid Dalton and [facility]P AT [facility]  The Complete Guide to fasting by Dr. Cornell Brady, [address] [facility] by Pedro Severino, Ph.D, R.D.   Weight Loss Surgery Will Not Treat Food Addiction by Shaun Lala Ph.D  The [address] [facility] on plant based nutrition  Fed Up - documentary about obesity (Free on Utube)  The Truth About Sugar - documentary on sugar (Free on Utube, https://youtu. be/8A2tdgbND1z)  The Dr. Louise Lockhart by Dr. Stalin Fatima MD  Fitlosophy Fitspiration - journal to better health (found at [employer] in fitness aisle)  What Happened to You?- a look at the impact trauma has on behavior written by Madina Tirado and Dr. Cayetano Hernandez Again by [place] - discovering your true self after trauma  Marie Fair talk on Vivogig, The Call to Courage  Podcasts: The Exam Room by the Physician's Committee, Nutrition Facts by Dr. Saida Castillo    We are here to support you with weight loss, but please remember that you still need your primary care provider for your routine health maintenance.

## 2023-04-16 RX ORDER — TIRZEPATIDE 5 MG/.5ML
INJECTION, SOLUTION SUBCUTANEOUS
Qty: 2 ML | Refills: 0 | Status: SHIPPED | OUTPATIENT
Start: 2023-04-16

## 2023-04-17 ENCOUNTER — PATIENT MESSAGE (OUTPATIENT)
Dept: INTERNAL MEDICINE CLINIC | Facility: CLINIC | Age: 63
End: 2023-04-17

## 2023-04-18 NOTE — TELEPHONE ENCOUNTER
From: Chris Devine  To: VIMAL Gutierrez  Sent: 4/17/2023 2:47 PM CDT  Subject: Chelle Loera, I called BC/BS and they are sending you a pre authorization form in order to determine if they will cover the [facility]. Just a heads up to look out for it.   Effie Olivares

## 2023-04-19 RX ORDER — SEMAGLUTIDE 0.5 MG/.5ML
0.5 INJECTION, SOLUTION SUBCUTANEOUS WEEKLY
Qty: 2 ML | Refills: 0 | Status: SHIPPED | OUTPATIENT
Start: 2023-04-19 | End: 2023-05-11

## 2023-04-26 ENCOUNTER — TELEPHONE (OUTPATIENT)
Dept: INTERNAL MEDICINE CLINIC | Facility: CLINIC | Age: 63
End: 2023-04-26

## 2023-06-03 ENCOUNTER — PATIENT MESSAGE (OUTPATIENT)
Dept: INTERNAL MEDICINE CLINIC | Facility: CLINIC | Age: 63
End: 2023-06-03

## 2023-06-03 DIAGNOSIS — E66.9 OBESITY WITH BODY MASS INDEX (BMI) OF 30.0 TO 39.9: ICD-10-CM

## 2023-06-03 DIAGNOSIS — Z51.81 THERAPEUTIC DRUG MONITORING: ICD-10-CM

## 2023-06-04 RX ORDER — PHENTERMINE HYDROCHLORIDE 37.5 MG/1
37.5 TABLET ORAL
Qty: 90 TABLET | Refills: 0 | Status: SHIPPED | OUTPATIENT
Start: 2023-06-04

## 2023-06-04 NOTE — TELEPHONE ENCOUNTER
Requesting phentermine  LOV: 4/4/23  RTC: 3 months  Last Relevant Labs: na  Filled: 1/31/23 #90 with 0 refills [place]31/23 [place]    No future appointments.     Patient informed to schedule for July

## 2023-06-04 NOTE — TELEPHONE ENCOUNTER
From: Eden Dose  To: VIMAL Raman  Sent: 6/3/2023 6:46 AM CDT  Subject: Phentermine     Did you ever refill my phentermine? I have not heard from pharmacy and when I went they said they were still waiting to hear from you.   Thanks

## 2023-06-20 ENCOUNTER — PATIENT MESSAGE (OUTPATIENT)
Dept: INTERNAL MEDICINE CLINIC | Facility: CLINIC | Age: 63
End: 2023-06-20

## 2023-06-20 NOTE — TELEPHONE ENCOUNTER
Requesting wegovy increase  LOV: 4/4/23  RTC: 3 months  Last Relevant Labs: na  Filled: 4/19/23 #2ml with 0 refills wegovy 0.5 mg    Future Appointments   Date Time Provider Manolo Shelby   7/5/2023 10:40 AM VIMAL Zambrano EMGAMELIAI [facility]

## 2023-06-30 DIAGNOSIS — E03.9 HYPOTHYROIDISM (ACQUIRED): ICD-10-CM

## 2023-06-30 RX ORDER — LEVOTHYROXINE SODIUM 88 UG/1
88 TABLET ORAL
Qty: 90 TABLET | Refills: 0 | Status: SHIPPED | OUTPATIENT
Start: 2023-06-30

## 2023-07-05 ENCOUNTER — OFFICE VISIT (OUTPATIENT)
Dept: INTERNAL MEDICINE CLINIC | Facility: CLINIC | Age: 63
End: 2023-07-05
Payer: COMMERCIAL

## 2023-07-05 VITALS
OXYGEN SATURATION: 98 % | RESPIRATION RATE: 16 BRPM | HEIGHT: 67.5 IN | DIASTOLIC BLOOD PRESSURE: 76 MMHG | HEART RATE: 86 BPM | SYSTOLIC BLOOD PRESSURE: 130 MMHG | WEIGHT: 177 LBS | BODY MASS INDEX: 27.46 KG/M2

## 2023-07-05 DIAGNOSIS — E55.9 VITAMIN D DEFICIENCY: ICD-10-CM

## 2023-07-05 DIAGNOSIS — E66.3 OVERWEIGHT (BMI 25.0-29.9): ICD-10-CM

## 2023-07-05 DIAGNOSIS — E03.9 HYPOTHYROIDISM (ACQUIRED): ICD-10-CM

## 2023-07-05 DIAGNOSIS — F41.9 ANXIETY AND DEPRESSION: ICD-10-CM

## 2023-07-05 DIAGNOSIS — F32.A ANXIETY AND DEPRESSION: ICD-10-CM

## 2023-07-05 DIAGNOSIS — Z51.81 THERAPEUTIC DRUG MONITORING: Primary | ICD-10-CM

## 2023-07-05 DIAGNOSIS — I10 HYPERTENSION, BENIGN: ICD-10-CM

## 2023-07-05 DIAGNOSIS — R73.03 PREDIABETES: ICD-10-CM

## 2023-07-05 DIAGNOSIS — E78.00 PURE HYPERCHOLESTEROLEMIA: ICD-10-CM

## 2023-07-05 PROCEDURE — 3078F DIAST BP <80 MM HG: CPT | Performed by: NURSE PRACTITIONER

## 2023-07-05 PROCEDURE — 3075F SYST BP GE 130 - 139MM HG: CPT | Performed by: NURSE PRACTITIONER

## 2023-07-05 PROCEDURE — 99213 OFFICE O/P EST LOW 20 MIN: CPT | Performed by: NURSE PRACTITIONER

## 2023-07-05 PROCEDURE — 3008F BODY MASS INDEX DOCD: CPT | Performed by: NURSE PRACTITIONER

## 2023-07-05 RX ORDER — SEMAGLUTIDE 0.25 MG/.5ML
0.25 INJECTION, SOLUTION SUBCUTANEOUS WEEKLY
Qty: 2 ML | Refills: 0 | Status: SHIPPED | OUTPATIENT
Start: 2023-07-05 | End: 2023-07-27

## 2023-07-05 NOTE — PATIENT INSTRUCTIONS
Next steps:  1. Fill your prescribed medication and take as discussed and prescribed: phentermine and wegovy 0.25mg weekly   2. Schedule a personal nutrition consultation with one of our registered dieticians       1. Drink water with meals and throughout the day, cut down on soda and/or juice if consumed. Consider flavored water options like Bubbly, Spindrift, Hint and Efrain. 2.  Eat breakfast daily and focus on having protein with each meal, examples include: greek yogurt, cottage cheese, hard boiled egg, whole grain toast with peanut butter. 3.  Reduce refined carbohydrates and sugars which includes items such as sweets, as well as rice, pasta, and bread and make sure to choose whole grain options when having them with just 1 serving per meal about the size of your inner palm. 4.  Consume non starchy veggies daily working towards making them a good 50% of your daily food intake. Add them to lunch and dinner consistently. 5.  Start a daily probiotic: VSL#3 is recommended, (order on line at www.vsl3. com). Take 1 capsule daily with water for 30 days, then reduce to 1 every other day (this will reduce the cost). Capsules can be left out for 2 weeks, but then must be refrigerated. Please download mary My Fitness Lavona Crea! Or Net Diary to monitor daily dietary intake and you will be able to see if you are eating the right amount of calories or too much or too little which would hinder weight loss. Additionally this will help to see your daily carbohydrate and protein intake. When you set the mary up choose 1-2 lbs/week as a goal.  Keeping a paper food journal is an option as well to remain accountable for your choices- this is the start to mindful eating! A low calorie diet has been consistently shown to support weight loss. Continue or start exercising to help establish a routine.  If not already exercising begin with 1 day and progress as able with long-term goal of 30 minutes 5 days a week at a minimum. Meditation daily can help manage and control stress. Chronic stress can make weight loss difficult. Exercising is one way to help with stress, but meditation using the CALM Trace or another comparable alternative can be done in your home or place of work with little time commitment. This Trace can also help work on behavior change and improve sleep. Check out the segment under Calm Masterclass and listen to The 4 Pillars of Health. A great way to begin learning about the foundation of lifestyle with practical tips to use in your every day. Check out www.yourweightmatters. org blog for continued daily support and education along this weight loss journey! Patient Resources:     Personal Training/Fitness Classes/Health Coaching     [address], [facility] [address] and [place] @ [email or web address] Full fitness center with group fitness and personal training. Discount available as client of [facility]. Health Coaching and Personal Training with Whit Pisano at our [facility]- individual weekly coaching with option to add personal training and small group fitness classes targeted at weight loss- [phone] and/or email @ [email or web address]  360FIT [place] [email or web address] Group Fitness [phone] and/or email John Kunz at [email or web address]. Nano Game Studio  [address] with multiple locations: Aetna (www.PassKit), Eat The [place] Group Fitness (www.ThriveOn. Nano Game Studio), Fit Body Bootcamp (www.Mount Knowledge USAbodybootFaveousp.Nano Game Studio), Sundance Diagnostics (www.CanWeNetwork), The Exercise  (www.exercisecoach.Nano Game Studio)     Online Fitness  Fitness  on Whole Foods in 10 DVD series- www. mdyqj40XCP. Nano Game Studio  Sit and Be Fit - Chair exercise series Www.sitandbefit. org  Hip Hop Fit with James Melendez at www.hiphopfit. net     Apps for on the HealthID Profile Inc 7 Minute Workout (orange box with white 7) - free on the go HIIT training trace  Peloton Trace @ www. GoPago     Nutrition Trackers and Tools  LoseIT! And My Fitness Pal apps and on line for tracking nutrition  NOOM - virtual health coaching  FitFoundation (healthy meals on the go) in [facility] @ www. cwnuhibkuovlz8c. Emerita Malloy MD @ www.Best Before MediatrTakepindWriteOn and Fátima Reis (keto and low carb plans recommended) @ www. XYBZPH53.HWX, Metabolic Meals @ www. MyMetabolicMeals. com - individual prepared meals to go  Mandiant, My1login, International Business Machines, Every Plate, Biozone Pharmaceuticals- on line meal delivery programs for preparation at home  [place] [employer] in [place] for homemade meals to go @ wwwRamamiamealMy-Hammer. LightPath Apps  Diet Doctor @ www. dietdoctor. com - low carb swaps  YummJimdo - meal prep and planning trace (www.yummly. com)     Stress Management/Behavior/Mindful Eating  CALM meditation trace (wwwLinguaSys)  Headspace  Am I Hungry? Mindful eating virtual  trace  Www.yourweightmatters. org - Obesity Action Coalition sponsored Blog posts daily  Motivation trace (black box with white \")- daily supportive messages sent to your phone     Books/Video Education/Podcasts  Mindless Eating by Carmina Peraza  Why We Get Sick by Marycarmen Barry (a book about insulin resistance)  Atomic Habits by Emma Espinal (a book about taking small steps to promote greater behavior change)   Can't Hurt Me by Kerry Mccarthy (a book exploring the power of discipline in achieving your goals)  The End of Dieting: How to Live for Life by Dr. Duke Domínguez M.D. or listen to The [facility] Episode 61: Understanding \"Nutritarian\" Eating w/Dr. Duke Domínguez  Your Body in Balance: The World Fuel Services Corporation of Food, Hormones, and Health by Dr. Jaimee Schneider  The Menopause Diet Plan by Bruno Al and [facility] AT [facility]  The Complete Guide to fasting by Dr. Gennaro Patel, [address] [facility] by Clark Willson, Ph.D, R.D.   Weight Loss Surgery Will Not Treat Food Addiction by Bucky Mojica Ph.D  The Game Changers- Flux Power Documentary on plant based nutrition  Fed Up - documentary about obesity (Free on Utube)  The Truth About Sugar - documentary on sugar (Free on Utube, https://youtu. be/7W6xlrkZO7o)  The Dr. Storm Adams by Dr. Mio Higuera MD  Fitlosophy Fitspiration - journal to better health (found at [employer] in fitness aisle)  What Happened to You?- a look at the impact trauma has on behavior written by Brigette Flood and Dr. Ghassan Blanton Again by Negro Marquez - discovering your true self after trauma  Kina Antoine talk on Turpitude, The Call to Tinsel Cinema  Podcasts: The Exam Room by the Physician's Committee, Nutrition Facts by Dr. Julián Rasmussen    We are here to support you with weight loss, but please remember that you still need your primary care provider for your routine health maintenance.

## 2023-07-25 RX ORDER — VENLAFAXINE HYDROCHLORIDE 75 MG/1
75 CAPSULE, EXTENDED RELEASE ORAL EVERY MORNING
Qty: 30 CAPSULE | Refills: 0 | Status: SHIPPED | OUTPATIENT
Start: 2023-07-25

## 2023-07-28 ENCOUNTER — LAB ENCOUNTER (OUTPATIENT)
Dept: LAB | Age: 63
End: 2023-07-28
Attending: INTERNAL MEDICINE
Payer: COMMERCIAL

## 2023-07-28 DIAGNOSIS — E78.00 PURE HYPERCHOLESTEROLEMIA: ICD-10-CM

## 2023-07-28 DIAGNOSIS — I25.10 CORONARY ATHEROSCLEROSIS OF NATIVE CORONARY ARTERY: Primary | ICD-10-CM

## 2023-07-28 DIAGNOSIS — E78.1 HYPERTRIGLYCERIDEMIA: ICD-10-CM

## 2023-07-28 DIAGNOSIS — R53.83 MALAISE AND FATIGUE: ICD-10-CM

## 2023-07-28 DIAGNOSIS — I10 ESSENTIAL HYPERTENSION, BENIGN: ICD-10-CM

## 2023-07-28 DIAGNOSIS — R53.81 MALAISE AND FATIGUE: ICD-10-CM

## 2023-07-28 LAB
ALBUMIN SERPL-MCNC: 3.8 G/DL (ref 3.4–5)
ALBUMIN/GLOB SERPL: 1 {RATIO} (ref 1–2)
ALP LIVER SERPL-CCNC: 97 U/L
ALT SERPL-CCNC: 29 U/L
ANION GAP SERPL CALC-SCNC: 6 MMOL/L (ref 0–18)
AST SERPL-CCNC: 21 U/L (ref 15–37)
BILIRUB SERPL-MCNC: 0.5 MG/DL (ref 0.1–2)
BUN BLD-MCNC: 18 MG/DL (ref 7–18)
CALCIUM BLD-MCNC: 9.6 MG/DL (ref 8.5–10.1)
CHLORIDE SERPL-SCNC: 104 MMOL/L (ref 98–112)
CHOLEST SERPL-MCNC: 135 MG/DL (ref ?–200)
CO2 SERPL-SCNC: 25 MMOL/L (ref 21–32)
CREAT BLD-MCNC: 0.97 MG/DL
EGFRCR SERPLBLD CKD-EPI 2021: 66 ML/MIN/1.73M2 (ref 60–?)
FASTING PATIENT LIPID ANSWER: YES
FASTING STATUS PATIENT QL REPORTED: YES
GLOBULIN PLAS-MCNC: 3.8 G/DL (ref 2.8–4.4)
GLUCOSE BLD-MCNC: 96 MG/DL (ref 70–99)
HDLC SERPL-MCNC: 71 MG/DL (ref 40–59)
LDLC SERPL CALC-MCNC: 48 MG/DL (ref ?–100)
NONHDLC SERPL-MCNC: 64 MG/DL (ref ?–130)
OSMOLALITY SERPL CALC.SUM OF ELEC: 282 MOSM/KG (ref 275–295)
POTASSIUM SERPL-SCNC: 4.1 MMOL/L (ref 3.5–5.1)
PROT SERPL-MCNC: 7.6 G/DL (ref 6.4–8.2)
SODIUM SERPL-SCNC: 135 MMOL/L (ref 136–145)
TRIGL SERPL-MCNC: 83 MG/DL (ref 30–149)
TSI SER-ACNC: 1.71 MIU/ML (ref 0.36–3.74)
VLDLC SERPL CALC-MCNC: 12 MG/DL (ref 0–30)

## 2023-07-28 PROCEDURE — 80061 LIPID PANEL: CPT

## 2023-07-28 PROCEDURE — 84443 ASSAY THYROID STIM HORMONE: CPT

## 2023-07-28 PROCEDURE — 36415 COLL VENOUS BLD VENIPUNCTURE: CPT

## 2023-07-28 PROCEDURE — 80053 COMPREHEN METABOLIC PANEL: CPT

## 2023-08-21 ENCOUNTER — PATIENT MESSAGE (OUTPATIENT)
Dept: INTERNAL MEDICINE CLINIC | Facility: CLINIC | Age: 63
End: 2023-08-21

## 2023-08-21 NOTE — TELEPHONE ENCOUNTER
Requesting   Requested Prescriptions     Pending Prescriptions Disp Refills    semaglutide-weight management 0.25 MG/0.5ML Subcutaneous Solution Auto-injector 2 mL 0     Sig: Inject 0.5 mL (0.25 mg total) into the skin once a week for 4 doses.      LOV: 7/5/23  RTC: 3 months  Filled: 8/3/23 #2 with 0 refills    Future Appointments   Date Time Provider Manolo Deei   9/12/2023  2:40 PM VIMAL Chamorro [facility] [address]   10/2/2023  9:30 AM Jun Pacheco DO EMG [address] [facility]     Pt ready for next dose

## 2023-08-21 NOTE — TELEPHONE ENCOUNTER
From: Radha Flores  To: Keira Steele, VIMAL  Sent: 8/21/2023 7:47 AM CDT  Subject: Cierrapuma Roca,  Please send in next dose ( level up) of Delorisvy to [address] [place] [address]. It is early, but I was notified by [facility] That it is extremely limited and to order early.   Thanks

## 2023-08-29 ENCOUNTER — PATIENT MESSAGE (OUTPATIENT)
Dept: INTERNAL MEDICINE CLINIC | Facility: CLINIC | Age: 63
End: 2023-08-29

## 2023-08-29 DIAGNOSIS — R73.03 PREDIABETES: ICD-10-CM

## 2023-08-29 DIAGNOSIS — Z51.81 THERAPEUTIC DRUG MONITORING: ICD-10-CM

## 2023-09-08 DIAGNOSIS — E66.9 OBESITY WITH BODY MASS INDEX (BMI) OF 30.0 TO 39.9: ICD-10-CM

## 2023-09-08 DIAGNOSIS — Z51.81 THERAPEUTIC DRUG MONITORING: ICD-10-CM

## 2023-09-08 RX ORDER — VENLAFAXINE HYDROCHLORIDE 75 MG/1
75 CAPSULE, EXTENDED RELEASE ORAL EVERY MORNING
Qty: 30 CAPSULE | Refills: 0 | Status: SHIPPED | OUTPATIENT
Start: 2023-09-08

## 2023-09-08 RX ORDER — PHENTERMINE HYDROCHLORIDE 37.5 MG/1
37.5 TABLET ORAL
Qty: 90 TABLET | Refills: 0 | Status: SHIPPED | OUTPATIENT
Start: 2023-09-08

## 2023-09-08 NOTE — TELEPHONE ENCOUNTER
Requesting   Requested Prescriptions     Pending Prescriptions Disp Refills    PHENTERMINE HCL 37.5 MG Oral Tab [Pharmacy Med Name: Phentermine HCl Oral Tablet 37.5 MG] 90 tablet 0     Sig: TAKE 1 TABLET BY MOUTH EVERY MORNING BEFORE BREAKFAST.        LOV: 7/5/23  RTC:   Last Relevant Labs:   Filled: 6/4/23 #90 with 0 refills    Future Appointments   Date Time Provider Manolo [place]   9/12/2023  2:40 PM VIMAL Fox [facility] EMG [address]   10/2/2023  9:30 AM Jase Monteiro DO EM[facility] [address]

## 2023-09-08 NOTE — TELEPHONE ENCOUNTER
.A refill request was received for:  Requested Prescriptions     Pending Prescriptions Disp Refills    VENLAFAXINE ER 75 MG Oral Capsule SR 24 Hr [Pharmacy Med Name: Venlafaxine HCl ER Oral Capsule Extended Release 24 Hour 75 MG] 30 capsule 0     Sig: Take 1 capsule (75 mg total) by mouth every morning.        Last refill date:   7/25/2023    Last office visit: 11/1/2023    Follow up due:  Future Appointments   Date Time Provider Manolo [place]   9/12/2023  2:40 PM VIMAL Vega [facility] EMG [address]   10/2/2023  9:30 AM Deonte Iqbal DO EM[facility] [address]

## 2023-09-12 ENCOUNTER — OFFICE VISIT (OUTPATIENT)
Dept: INTERNAL MEDICINE CLINIC | Facility: CLINIC | Age: 63
End: 2023-09-12
Payer: COMMERCIAL

## 2023-09-12 VITALS
HEIGHT: 67.5 IN | DIASTOLIC BLOOD PRESSURE: 70 MMHG | BODY MASS INDEX: 28.54 KG/M2 | RESPIRATION RATE: 18 BRPM | HEART RATE: 96 BPM | SYSTOLIC BLOOD PRESSURE: 126 MMHG | WEIGHT: 184 LBS

## 2023-09-12 DIAGNOSIS — F32.A ANXIETY AND DEPRESSION: ICD-10-CM

## 2023-09-12 DIAGNOSIS — E66.9 OBESITY WITH BODY MASS INDEX (BMI) OF 30.0 TO 39.9: ICD-10-CM

## 2023-09-12 DIAGNOSIS — F41.9 ANXIETY AND DEPRESSION: ICD-10-CM

## 2023-09-12 DIAGNOSIS — Z51.81 THERAPEUTIC DRUG MONITORING: Primary | ICD-10-CM

## 2023-09-12 DIAGNOSIS — E55.9 VITAMIN D DEFICIENCY: ICD-10-CM

## 2023-09-12 DIAGNOSIS — E78.00 PURE HYPERCHOLESTEROLEMIA: ICD-10-CM

## 2023-09-12 DIAGNOSIS — E03.9 HYPOTHYROIDISM (ACQUIRED): ICD-10-CM

## 2023-09-12 DIAGNOSIS — R73.03 PREDIABETES: ICD-10-CM

## 2023-09-12 DIAGNOSIS — E66.3 OVERWEIGHT (BMI 25.0-29.9): ICD-10-CM

## 2023-09-12 DIAGNOSIS — I10 HYPERTENSION, BENIGN: ICD-10-CM

## 2023-09-12 PROCEDURE — 3078F DIAST BP <80 MM HG: CPT | Performed by: NURSE PRACTITIONER

## 2023-09-12 PROCEDURE — 99213 OFFICE O/P EST LOW 20 MIN: CPT | Performed by: NURSE PRACTITIONER

## 2023-09-12 PROCEDURE — 3008F BODY MASS INDEX DOCD: CPT | Performed by: NURSE PRACTITIONER

## 2023-09-12 PROCEDURE — 3074F SYST BP LT 130 MM HG: CPT | Performed by: NURSE PRACTITIONER

## 2023-09-12 RX ORDER — SEMAGLUTIDE 0.25 MG/.5ML
INJECTION, SOLUTION SUBCUTANEOUS
Qty: 2 ML | Refills: 0 | OUTPATIENT
Start: 2023-09-12

## 2023-10-13 ENCOUNTER — TELEPHONE (OUTPATIENT)
Dept: INTERNAL MEDICINE CLINIC | Facility: CLINIC | Age: 63
End: 2023-10-13

## 2023-10-13 NOTE — TELEPHONE ENCOUNTER
Phylliss Dural (Key: [id number])  VQSSMA 0.25MG/0.5ML auto-injectors    PA partially done on CMM, need to attach \"note\" Please advise.

## 2023-10-23 ENCOUNTER — PATIENT MESSAGE (OUTPATIENT)
Dept: INTERNAL MEDICINE CLINIC | Facility: CLINIC | Age: 63
End: 2023-10-23

## 2023-12-06 ENCOUNTER — OFFICE VISIT (OUTPATIENT)
Dept: INTERNAL MEDICINE CLINIC | Facility: CLINIC | Age: 63
End: 2023-12-06
Payer: COMMERCIAL

## 2023-12-06 VITALS
HEIGHT: 67.5 IN | SYSTOLIC BLOOD PRESSURE: 130 MMHG | WEIGHT: 185 LBS | DIASTOLIC BLOOD PRESSURE: 78 MMHG | BODY MASS INDEX: 28.7 KG/M2 | HEART RATE: 76 BPM | RESPIRATION RATE: 16 BRPM

## 2023-12-06 DIAGNOSIS — E55.9 VITAMIN D DEFICIENCY: ICD-10-CM

## 2023-12-06 DIAGNOSIS — Z51.81 THERAPEUTIC DRUG MONITORING: Primary | ICD-10-CM

## 2023-12-06 DIAGNOSIS — E03.9 HYPOTHYROIDISM (ACQUIRED): ICD-10-CM

## 2023-12-06 DIAGNOSIS — E78.00 PURE HYPERCHOLESTEROLEMIA: ICD-10-CM

## 2023-12-06 DIAGNOSIS — R73.03 PREDIABETES: ICD-10-CM

## 2023-12-06 DIAGNOSIS — F41.9 ANXIETY AND DEPRESSION: ICD-10-CM

## 2023-12-06 DIAGNOSIS — I10 HYPERTENSION, BENIGN: ICD-10-CM

## 2023-12-06 DIAGNOSIS — E66.3 OVERWEIGHT (BMI 25.0-29.9): ICD-10-CM

## 2023-12-06 DIAGNOSIS — F32.A ANXIETY AND DEPRESSION: ICD-10-CM

## 2023-12-06 PROCEDURE — 3008F BODY MASS INDEX DOCD: CPT | Performed by: NURSE PRACTITIONER

## 2023-12-06 PROCEDURE — 3075F SYST BP GE 130 - 139MM HG: CPT | Performed by: NURSE PRACTITIONER

## 2023-12-06 PROCEDURE — 99214 OFFICE O/P EST MOD 30 MIN: CPT | Performed by: NURSE PRACTITIONER

## 2023-12-06 PROCEDURE — 3078F DIAST BP <80 MM HG: CPT | Performed by: NURSE PRACTITIONER

## 2023-12-06 RX ORDER — TIRZEPATIDE 2.5 MG/.5ML
2.5 INJECTION, SOLUTION SUBCUTANEOUS WEEKLY
Qty: 2 ML | Refills: 0 | Status: SHIPPED | OUTPATIENT
Start: 2023-12-06

## 2023-12-07 RX ORDER — DIETHYLPROPION HYDROCHLORIDE 75 MG/1
1 TABLET ORAL EVERY MORNING
Qty: 30 TABLET | Refills: 2 | Status: SHIPPED | OUTPATIENT
Start: 2023-12-07

## 2023-12-07 NOTE — PATIENT INSTRUCTIONS
Next steps:  1. Fill your prescribed medication and take as discussed and prescribed: stop phentermine  Will trial diethylpropion  Will trial zepbound 2.5mg weekly x 4 weeks and then increase  2. Schedule a personal nutrition consultation with one of our registered dieticians     Please try to work on the following dietary changes:  Daily protein recommendation to start:  grams  Daily carbohydrate: <110g  Daily calories: 1,200-1,300  1. Drink water with meals and throughout the day, cut down on soda and/or juice if consumed. Consider flavored water options like Bubbly, Spindrift, Hint and Efrain. 2.  Eat breakfast daily and focus on having protein with each meal, examples include: greek yogurt, cottage cheese, hard boiled egg, whole grain toast with peanut butter. 3.  Reduce refined carbohydrates and sugars which includes items such as sweets, as well as rice, pasta, and bread and make sure to choose whole grain options when having them with just 1 serving per meal about the size of your inner palm. 4.  Consume non starchy veggies daily working towards making them a good 50% of your daily food intake. Add them to lunch and dinner consistently. 5.  Start a daily probiotic: VSL#3 is recommended, (order on line at www.vsl3. com). Take 1 capsule daily with water for 30 days, then reduce to 1 every other day (this will reduce the cost). Capsules can be left out for 2 weeks, but then must be refrigerated. Please download mary My Fitness Jo Jarrett! Or Net Diary to monitor daily dietary intake and you will be able to see if you are eating the right amount of calories or too much or too little which would hinder weight loss. Additionally this will help to see your daily carbohydrate and protein intake. When you set the mary up choose 1-2 lbs/week as a goal.  Keeping a paper food journal is an option as well to remain accountable for your choices- this is the start to mindful eating!  A low calorie diet has been consistently shown to support weight loss. Continue or start exercising to help establish a routine. If not already exercising begin with 1 day and progress as able with long-term goal of 30 minutes 5 days a week at a minimum. Meditation daily can help manage and control stress. Chronic stress can make weight loss difficult. Exercising is one way to help with stress, but meditation using the CALM Trace or another comparable alternative can be done in your home or place of work with little time commitment. This Trace can also help work on behavior change and improve sleep. Check out the segment under Calm Masterclass and listen to The 4 Pillars of Health. A great way to begin learning about the foundation of lifestyle with practical tips to use in your every day. Check out www.yourweightmatters. org blog for continued daily support and education along this weight loss journey! Patient Resources:     Personal Training/Fitness Classes/Health Coaching     Clarkson NORY NUNES and Glenn Ferraro @ [email or web address]/ Full fitness center with group fitness and personal training. Discount available as client of [facility]. Health Coaching and Personal Training with Star Luevano at our [facility]- individual weekly coaching with option to add personal training and small group fitness classes targeted at weight loss- [phone] and/or email @ [email or web address]  360FIT [place] [email or web address] Group Fitness [phone] and/or email Roldan Jacob at [email or web address]  [address] [facility] with multiple locations: Aetna (www.AlphaBeta Labs), Eat The Frog Fitness (www.CausePlay. Hopkins Golf), Fit Body Bootcamp (www.PiximbodybootQualaris Healthcare Solutionsp.Hopkins Golf), Vencosba [place] Small Business Advisors (www.Quantum Materials Corporation. Hopkins Golf), The Exercise  (www.exercisecoach.Hopkins Golf)     Online Fitness  Fitness  on Whole Foods in 10 DVD series- www.wdpzl20XWQ. Branch2  Sit and Be Fit - Chair exercise series Www.sitandbefit. org  Hip Hop Fit with James Melendez at www.hiphopfit. net     Apps for on the MumsWay 7 Minute Workout (orange box with white 7) - free on the go HIIT training trace  Peloton Trace @ wwwYapp     Nutrition Trackers and Tools  LoseIT! And My Fitness Pal apps and on line for tracking nutrition  NOOM - virtual health coaching  FitFoundation (healthy meals on the go) in [facility]-SCI @ www. celutynwuozdj3k. [place] MD @ www.bistromd.com and [place] (keto and low carb plans recommended) @ www. newMentor.WU, Metabolic Meals @ www. Tiantian. comabolicMeals. com - individual prepared meals to go  Culture Kitchen, QPSoftware, International Business Machines, Every Plate, FedCyber- on line meal delivery programs for preparation at home  [employer] in [place] for homemade meals to go @ www.mealvillage. Branch2  Diet Doctor @ www. dietdoctor. Branch2 - low carb swaps  YummDynamic Yield - meal prep and planning trace (www.yummly. com)     Stress Management/Behavior/Mindful Eating  CALM meditation trace (www.calmPrudent Energy)  Headspace  Am I Hungry? Mindful eating virtual  trace  Www.yourweightmatters. org - Obesity Action Coalition sponsored Blog posts daily  Motivation trace (black box with white \")- daily supportive messages sent to your phone     Books/Video Education/Podcasts  Mindless Eating by Gucci Bennett  Why We Get Sick by Bridget Genao (a book about insulin resistance)  Atomic Habits by Lubna Garnett (a book about taking small steps to promote greater behavior change)   Can't Hurt Me by Quinton Barry (a book exploring the power of discipline in achieving your goals)  The End of Dieting: How to Live for Life by Dr. Celine Ramírez M.D. or listen to The [address] Episode 61: Understanding \"Nutritarian\" Eating w/Dr. Celine Ramírez  Your Body in Balance:  The World Fuel Services Corporation of Food, Hormones, and Health by Dr. Leona Henson  The Menopause Diet Plan by Magnus Green and WOMANS [facility] HOSP AT [facility]  The Complete Guide to fasting by Dr. Jaimee Knight, [address] [facility] by Thi Braswell, Ph.D, R.D. Weight Loss Surgery Will Not Treat Food Addiction by Jane Dewitt Ph.D  The [address] [place] [address] on plant based nutrition  Fed Up - documentary about obesity (Free on [facility])  The Truth About Sugar - documentary on sugar (Free on Utube, https://youtu. be/7M1uglpXQ3k)  The Dr. Remedios Joya by Dr. Paola Alcala MD  Fitlosophy Fitspiration - journal to better health (found at [employer] in fitness aisle)  What Happened to You?- a look at the impact trauma has on behavior written by Ray Childress and Dr. Epstein Lot Again by Jackie Mayorga - discovering your true self after trauma  Lola Browning talk on Stitcher, The Call to Courage  Podcasts: The Exam Room by the Physician's Committee, Nutrition Facts by Dr. Dan Shows    We are here to support you with weight loss, but please remember that you still need your primary care provider for your routine health maintenance.

## 2023-12-14 DIAGNOSIS — Z51.81 THERAPEUTIC DRUG MONITORING: ICD-10-CM

## 2023-12-14 DIAGNOSIS — R73.03 PREDIABETES: ICD-10-CM

## 2023-12-14 NOTE — TELEPHONE ENCOUNTER
Requesting   Requested Prescriptions     Pending Prescriptions Disp Refills    METFORMIN HCL 1000 MG Oral Tab [Pharmacy Med Name: metFORMIN HCl Oral Tablet 1000 MG] 180 tablet 0     Sig: TAKE 1 TABLET BY MOUTH 2 TIMES A DAY WITH MEALS     LOV: 12/6/23  RTC: 3 months  Filled: 8/31/23 #180 with 0 refills    No future appointments.

## 2024-01-01 ENCOUNTER — PATIENT MESSAGE (OUTPATIENT)
Dept: INTERNAL MEDICINE CLINIC | Facility: CLINIC | Age: 64
End: 2024-01-01

## 2024-01-01 DIAGNOSIS — E78.00 PURE HYPERCHOLESTEROLEMIA: ICD-10-CM

## 2024-01-01 DIAGNOSIS — E66.3 OVERWEIGHT (BMI 25.0-29.9): Primary | ICD-10-CM

## 2024-01-04 ENCOUNTER — APPOINTMENT (OUTPATIENT)
Dept: ULTRASOUND IMAGING | Age: 64
End: 2024-01-04
Attending: EMERGENCY MEDICINE
Payer: COMMERCIAL

## 2024-01-04 ENCOUNTER — APPOINTMENT (OUTPATIENT)
Dept: GENERAL RADIOLOGY | Age: 64
End: 2024-01-04
Attending: EMERGENCY MEDICINE
Payer: COMMERCIAL

## 2024-01-04 ENCOUNTER — HOSPITAL ENCOUNTER (OUTPATIENT)
Age: 64
Discharge: HOME | End: 2024-01-04
Attending: EMERGENCY MEDICINE
Payer: COMMERCIAL

## 2024-01-04 VITALS
HEIGHT: 67 IN | SYSTOLIC BLOOD PRESSURE: 137 MMHG | DIASTOLIC BLOOD PRESSURE: 89 MMHG | WEIGHT: 185 LBS | BODY MASS INDEX: 29.03 KG/M2 | TEMPERATURE: 97 F | RESPIRATION RATE: 18 BRPM | HEART RATE: 75 BPM | OXYGEN SATURATION: 99 %

## 2024-01-04 DIAGNOSIS — M71.21 SYNOVIAL CYST OF RIGHT POPLITEAL SPACE: ICD-10-CM

## 2024-01-04 DIAGNOSIS — M25.561 ACUTE PAIN OF RIGHT KNEE: Primary | ICD-10-CM

## 2024-01-04 PROCEDURE — 73560 X-RAY EXAM OF KNEE 1 OR 2: CPT | Performed by: EMERGENCY MEDICINE

## 2024-01-04 PROCEDURE — 93971 EXTREMITY STUDY: CPT | Performed by: EMERGENCY MEDICINE

## 2024-01-04 PROCEDURE — 99214 OFFICE O/P EST MOD 30 MIN: CPT

## 2024-01-04 RX ORDER — CYCLOBENZAPRINE HCL 10 MG
10 TABLET ORAL 3 TIMES DAILY PRN
Qty: 20 TABLET | Refills: 0 | Status: SHIPPED | OUTPATIENT
Start: 2024-01-04 | End: 2024-01-11

## 2024-01-04 NOTE — ED PROVIDER NOTES
Patient Seen in: [facility]      History     Chief Complaint   Patient presents with    Knee Pain    Swelling     Stated Complaint: knee pain, swelling    Subjective:   63-year-old female presents with right knee pain.  States mostly anterior lateral knee pain.  States she was on her knees cleaning for several hours.  States that after that start developing the pain.  Swelling as well.  No pain in the right posterior proximal calf as well.  Has been icing and elevating and resting it at home with little improvement.  Came in for evaluation.            Objective:   Past Medical History:   Diagnosis Date    ANXIETY     Anxiety     DEPRESSION     Depression     High cholesterol     HYPOTHYROIDISM     Night sweats     Thyroid disease     Weight gain               Past Surgical History:   Procedure Laterality Date    BACK SURGERY            X 2                Social History     Socioeconomic History    Marital status:     Number of children: 2   Tobacco Use    Smoking status: Former     Types: Cigarettes     Quit date:      Years since quittin.0    Smokeless tobacco: Never    Tobacco comments:     5-10 per week    Vaping Use    Vaping Use: Never used   Substance and Sexual Activity    Alcohol use: Yes     Alcohol/week: 0.0 standard drinks of alcohol     Comment: occasional    Drug use: No   Other Topics Concern     Service No    Blood Transfusions No    Caffeine Concern Yes     Comment: 2 cups per day     Occupational Exposure No    Hobby Hazards No    Sleep Concern No    Stress Concern Yes    Weight Concern Yes     Comment: would like to lose    Special Diet No    Back Care No    Exercise No    Bike Helmet Yes    Seat Belt Yes    Self-Exams Yes              Review of Systems   Constitutional:  Negative for fever.   Respiratory:  Negative for shortness of breath.    Cardiovascular:  Negative for chest pain.   Gastrointestinal:  Negative for abdominal pain.    Musculoskeletal:  Positive for arthralgias. Negative for back pain.   Neurological:  Negative for weakness and numbness.       Positive for stated complaint: knee pain, swelling  Other systems are as noted in HPI.  Constitutional and vital signs reviewed.      All other systems reviewed and negative except as noted above.    Physical Exam     ED Triage Vitals [01/04/24 1326]   /89   Pulse 75   Resp 18   Temp 97.2 °F (36.2 °C)   Temp src Temporal   SpO2 99 %   O2 Device None (Room air)       Current:/89   Pulse 75   Temp 97.2 °F (36.2 °C) (Temporal)   Resp 18   Ht 170.2 cm (5' 7\")   Wt 83.9 kg   SpO2 99%   BMI 28.98 kg/m²         Physical Exam  Vitals and nursing note reviewed.   Constitutional:       Appearance: She is not toxic-appearing.   HENT:      Head: Normocephalic.   Cardiovascular:      Rate and Rhythm: Normal rate.      Pulses: Normal pulses.      Heart sounds: Normal heart sounds.   Pulmonary:      Effort: Pulmonary effort is normal. No respiratory distress.      Breath sounds: Normal breath sounds.   Abdominal:      Palpations: Abdomen is soft.   Musculoskeletal:      Cervical back: Neck supple.      Comments: Fullness in the popliteal fossa consistent with Baker's cyst.  She has clinically a small suprapatellar joint effusion of the right knee.  No asymmetry of the lower extremities.  Neurovascular intact.  No skin changes   Skin:     General: Skin is warm and dry.   Neurological:      General: No focal deficit present.      Mental Status: She is alert.      Sensory: No sensory deficit.      Coordination: Coordination normal.   Psychiatric:         Mood and Affect: Mood normal.               ED Course   Labs Reviewed - No data to display                   MDM     US VENOUS DOPPLER LEG RIGHT - DIAG IMG (CPT=93971)    Result Date: 1/4/2024  CONCLUSION:  Negative for DVT right leg.  Baker's cyst present.   LOCATION:  [id number]    Dictated by (CST): Ubaldo Lewis MD on 1/04/2024 at 2:49  PM     Finalized by (CST): Ubaldo Lewis MD on 1/04/2024 at 2:51 PM       XR KNEE (1 OR 2 VIEWS), RIGHT (CPT=73560)    Result Date: 1/4/2024  CONCLUSION:    No acute fracture, or dislocation.  Tiny osteophyte upper pole patella lateral view indicating early degenerative changes at the patellofemoral joint.  Mild anterior knee swelling.  No sizable joint effusion.  No destructive lytic lesion.  Traction enthesopathy seen with benign-appearing bone thickening posterolateral aspect of the proximal tibia.  LOCATION:  [id number]   Dictated by (CST): Ubaldo Lewis MD on 1/04/2024 at 2:08 PM     Finalized by (CST): Ubaldo Lewis MD on 1/04/2024 at 2:09 PM        63-year-old female presents with Baker's cyst and right anterolateral knee pain.  See the x-ray findings as above.  Given orthopedic follow-up.  Declines pain medication but would like some muscle relaxants.  May need some orthopedic surgical intervention.  Will follow-up as an outpatient peer return precaution provided, shared decision made utilized                                       Medical Decision Making      Disposition and Plan     Clinical Impression:  1. Acute pain of right knee    2. Synovial cyst of right popliteal space         Disposition:  Discharge  1/4/2024  3:05 pm    Follow-up:  Ila Gifford DO  [address] [facility] [address]  [facility] [address]  [phone]          [facility] - Orthopedics  [address]  [facility] [address] [phone]              Medications Prescribed:  Current Discharge Medication List        START taking these medications    Details   cyclobenzaprine 10 MG Oral Tab Take 1 tablet (10 mg total) by mouth 3 (three) times daily as needed for Muscle spasms.  Qty: 20 tablet, Refills: 0

## 2024-01-05 RX ORDER — TIRZEPATIDE 2.5 MG/.5ML
2.5 INJECTION, SOLUTION SUBCUTANEOUS WEEKLY
Qty: 2 ML | Refills: 0 | Status: SHIPPED | OUTPATIENT
Start: 2024-01-05

## 2024-01-05 NOTE — TELEPHONE ENCOUNTER
From: Jimena Goel  To: Chrissie Vega  Sent: 1/1/2024 9:18 AM CST  Subject: Still no Zepbound    Hi Chrissie Suarez,  Just wanted you to know that I have not noticed anything with the new medication. I also can not get any Zepbound from the two pharmacies the prescription was sent. Is this a joke? Are we really going to play the same game as with the Wegovy? Are any of your patients getting this drug? If so why aren’t you sending it to those pharmacies? I’m done with this run around.

## 2024-01-08 ENCOUNTER — OFFICE VISIT (OUTPATIENT)
Dept: ORTHOPEDICS CLINIC | Facility: CLINIC | Age: 64
End: 2024-01-08
Payer: COMMERCIAL

## 2024-01-08 VITALS — BODY MASS INDEX: 29.03 KG/M2 | WEIGHT: 185 LBS | HEIGHT: 67 IN

## 2024-01-08 DIAGNOSIS — M17.11 PATELLOFEMORAL ARTHRITIS OF RIGHT KNEE: ICD-10-CM

## 2024-01-08 DIAGNOSIS — M25.461 KNEE EFFUSION, RIGHT: Primary | ICD-10-CM

## 2024-01-08 DIAGNOSIS — M71.21 SYNOVIAL CYST OF RIGHT POPLITEAL SPACE: ICD-10-CM

## 2024-01-08 RX ORDER — KETOROLAC TROMETHAMINE 30 MG/ML
30 INJECTION, SOLUTION INTRAMUSCULAR; INTRAVENOUS ONCE
Status: SHIPPED | OUTPATIENT
Start: 2024-01-08 | End: 2024-01-09

## 2024-01-08 RX ORDER — TRIAMCINOLONE ACETONIDE 40 MG/ML
40 INJECTION, SUSPENSION INTRA-ARTICULAR; INTRAMUSCULAR ONCE
Status: SHIPPED | OUTPATIENT
Start: 2024-01-08

## 2024-01-08 NOTE — H&P
Orthopaedic Surgery  [address] [place] [id number]  [phone]     NEW PATIENT VISIT - HISTORY AND PHYSICAL EXAMINATION     Name: Jimena Goel   MRN: [id number]  Date: 2024     CC: Right Knee Pain    REFERRED BY: Ila Gifford DO    HPI:   Jimena Goel is a very pleasant 63 year old female who presents today for evaluation of right knee pain caused by a Baker's cyst. This began 1 month ago after cleaning the floor on her hands and knees. She felt pain in the back of the knee where she noticed a bump in the area. She then iced and rested. Pain is up to 4/10 with swelling. She has not tried any conservative treatment measures.    She enjoys walking and water classes.    PMH:   Past Medical History:   Diagnosis Date    ANXIETY     Anxiety     DEPRESSION     Depression     High cholesterol     HYPOTHYROIDISM     Night sweats     Thyroid disease     Weight gain        PAST SURGICAL HX:  Past Surgical History:   Procedure Laterality Date    BACK SURGERY            X 2       FAMILY HX:  Family History   Problem Relation Age of Onset    Breast Cancer Maternal Grandmother 53    Diabetes Maternal Grandmother     Thyroid disease Mother     Heart Disease Father     Hypertension Father     Heart Attack Father     Hypertension Maternal Grandfather     Heart Attack Maternal Grandfather     Colon Cancer Brother     Heart Attack Paternal Uncle     Genetic Disease Paternal Grandfather        ALLERGIES:  Patient has no known allergies.    MEDICATIONS:   Current Outpatient Medications   Medication Sig Dispense Refill    Tirzepatide-Weight Management (ZEPBOUND) 2.5 MG/0.5ML Subcutaneous Solution Auto-injector Inject 2.5 mg into the skin once a week. 2 mL 0    cyclobenzaprine 10 MG Oral Tab Take 1 tablet (10 mg total) by mouth 3 (three) times daily as needed for Muscle spasms. 20 tablet 0    Tirzepatide-Weight Management (ZEPBOUND) 2.5 MG/0.5ML Subcutaneous Solution Auto-injector Inject 2.5 mg into  the skin once a week. 2 mL 0    METFORMIN HCL 1000 MG Oral Tab TAKE 1 TABLET BY MOUTH 2 TIMES A DAY WITH MEALS 180 tablet 0    Diethylpropion HCl ER 75 MG Oral Tablet 24 Hr Take 1 tablet (75 mg total) by mouth every morning. 30 tablet 2    venlafaxine ER 75 MG Oral Capsule SR 24 Hr Take 1 capsule (75 mg total) by mouth every morning. 90 capsule 1    levothyroxine 88 MCG Oral Tab Take 1 tablet (88 mcg total) by mouth before breakfast. 90 tablet 1    Coenzyme Q10 (CO Q 10 OR) Take by mouth.      BIOTIN OR Take by mouth daily.        hydrochlorothiazide 12.5 MG Oral Tab Take 1 tablet (12.5 mg total) by mouth daily.      BABY ASPIRIN OR daily.        atorvastatin 20 MG Oral Tab Take 1 tablet (20 mg total) by mouth nightly.         ROS: A comprehensive 14 point review of systems was performed and was negative aside from the aforementioned per history of present illness.    SOCIAL HX:  Social History     Tobacco Use    Smoking status: Former     Types: Cigarettes     Quit date: 2018     Years since quittin.0    Smokeless tobacco: Never    Tobacco comments:     5-10 per week    Substance Use Topics    Alcohol use: Yes     Alcohol/week: 0.0 standard drinks of alcohol     Comment: occasional       PE:   There were no vitals filed for this visit.  Estimated body mass index is 28.98 kg/m² as calculated from the following:    Height as of 24: 5' 7\" (1.702 m).    Weight as of 24: 185 lb.    Physical Exam  Constitutional:       Appearance: Normal appearance.   HENT:      Head: Normocephalic and atraumatic.   Eyes:      Extraocular Movements: Extraocular movements intact.   Neck:      Musculoskeletal: Normal range of motion and neck supple.   Cardiovascular:      Pulses: Normal pulses.   Pulmonary:      Effort: Pulmonary effort is normal. No respiratory distress.   Abdominal:      General: There is no distension.   Skin:     General: Skin is warm.      Capillary Refill: Capillary refill takes less than 2 seconds.       Findings: No bruising.   Neurological:      General: No focal deficit present.      Mental Status: Alert.   Psychiatric:         Mood and Affect: Mood normal.     Examination of the right knee demonstrates:   Skin is intact, warm and dry.   Atrophy: mild    Effusion: small    Joint line tenderness: medial  Crepitation: none   Ankush: Negative   Patellar mobility: normal without apprehension  J-sign: none    ROM: Extension lacking less than 5 degrees  Flexion 120 degrees  ACL:  Negative Lachman, Negative Pivot Shift   PCL:  Negative Posterior Drawer  Collateral Ligaments: Stable to Varus and Valgus stress at 0 and 30 degrees  Strength: mild weakness   Hip joint: normal pain-free ROM   Gait:  mildly antalgic   Leg length: equal and symmetric  Alignment:  neutral     No obvious peripheral edema noted.   Distal neurovascular exam demonstrates normal perfusion, intact sensation to light touch and full strength.     Examination of the contralateral knee demonstrates:  No significant atrophy, swelling or effusion. Full range of motion. Neurovascularly intact distally    Radiographic Examination/Diagnostics:  Knee XR and US personally viewed, independently interpreted and radiology report was reviewed.    US VENOUS DOPPLER LEG RIGHT - DIAG IMG (CPT=93971)    Result Date: 1/4/2024  PROCEDURE:  US VENOUS DOPPLER LEG RIGHT - DIAG IMG (CPT=93971)  COMPARISON:  None.  INDICATIONS:  knee pain, swelling, r calf pain  TECHNIQUE:  Real time, grey scale, and duplex ultrasound was used to evaluate the lower extremity venous system. B-mode two-dimensional images of the vascular structures, Doppler spectral analysis, and color flow.  Doppler imaging were performed.  The following veins were imaged:  Common, deep, and superficial femoral, popliteal, sapheno-femoral junction, posterior tibial veins, and the contralateral common femoral vein.  PATIENT STATED HISTORY: (As transcribed by Technologist)  Patient states she has right knee  pain and swelling for a month.    FINDINGS:  EXTREMITY EXAMINED:  Right lower SAPHENOFEMORAL JUNCTION:  No reflux. THROMBI:  None visible. COMPRESSION:  Normal compressibility, phasicity, and augmentation. OTHER:  Baker's cyst 6.8 x 4.8 x 1.7 cm.            CONCLUSION:  Negative for DVT right leg.  Baker's cyst present.   LOCATION:  [id number]    Dictated by ([facility]): Ubaldo Lewis MD on 1/04/2024 at 2:49 PM     Finalized by ([facility]): Ubaldo Lewis MD on 1/04/2024 at 2:51 PM       XR KNEE (1 OR 2 VIEWS), RIGHT (CPT=73560)    Result Date: 1/4/2024  PROCEDURE:  XR KNEE (1 OR 2 VIEWS), RIGHT (CPT=73560)  COMPARISON:  None.  INDICATIONS:  knee pain, swelling  PATIENT STATED HISTORY: (As transcribed by Technologist)  Patient c/o right lateral knee pain and swelling for 1 month after washing floors on her knees.              CONCLUSION:    No acute fracture, or dislocation.  Tiny osteophyte upper pole patella lateral view indicating early degenerative changes at the patellofemoral joint.  Mild anterior knee swelling.  No sizable joint effusion.  No destructive lytic lesion.  Traction enthesopathy seen with benign-appearing bone thickening posterolateral aspect of the proximal tibia.  LOCATION:  [id number]   Dictated by ([facility]): Ubaldo Lewis MD on 1/04/2024 at 2:08 PM     Finalized by ([facility]): Ubaldo Lewis MD on 1/04/2024 at 2:09 PM       Independent review reveals mild osteophytosis of the patella suggestive patellofemoral osteoarthritis.  Overall mild knee effusion without significant lytic pathology.    IMPRESSION: Jimena Goel is a 63 year old female with right knee effusion and synovial cyst of popliteal space ongoing or 1 month. Fortunately, treatment is amenable to conservative management with intra-articular corticosteroid and ketorolac injection coupled with physical therapy.    PLAN:   We had a detailed discussion outlining the etiology, anatomy, pathophysiology, and natural history of patient's findings. Imaging was  reviewed in detail and correlated to a 3-dimensional model of the knee.     We reviewed the treatment of this disease condition.  Fortunately, treatment is amenable to conservative treatment which we chose to optimize at today's visit.  After a discussion of a variety of conservative treatment options, I recommended intra-articular injection with corticosteroid and ketorolac coupled with physical therapy to aid in strengthening, range of motion, functional improvement, and return to baseline activity.       We elected to proceed with the injection procedure at today's visit. We discussed the risk and benefits of the procedure; including, but not limited to: infection, injury to blood vessels, nerve injury, prolonged pain, swelling, site soreness, failure to progress, and need for advanced treatments.  The patient voiced understanding and agreed to proceed with the treatment plan.      FOLLOW-UP:  Return to clinic on an as needed basis.       Latrice Molina MD  Knee, Shoulder, & Elbow Surgery / Sports Medicine Specialist  Orthopaedic Surgery  [address] [place] [address] [id number] [place] [address].org  [email or web address]  t: [phone]  o: [phone]  f: [phone]    This note was dictated using Dragon software.  While it was briefly proofread prior to completion, some grammatical, spelling, and word choice errors due to dictation may still occur.

## 2024-01-08 NOTE — PROCEDURES
Right Knee Intra-articular Injection    Name: Jimena Goel   MRN: [id number]  Date: 1/8/2024     Clinical Indications:   Persistent knee pain refractory to conservative measures.     After informed consent, the injection site was marked, sterilized with topical chlorhexidine antiseptic, and locally anesthetized with skin refrigerant.    The patient was situation in a comfortable position. Using sterile technique: 1 mL of 30mg/mL of Ketorolac, 2 mL of 0.5% Bupivicaine, 2 mL of 1% Lidocaine, and 1 mL of 40 mg/ml Triamcinolone was injected utilizing anterolateral approach with a 22 gauge needle.  A band-aid was applied.  The patient tolerated the procedure well.        Latrice Molina MD  Knee, Shoulder, & Elbow Surgery / Sports Medicine Specialist  Orthopaedic Surgery  [address] [place] [address].org  [email or web address]  t: [phone]  o: [phone]  f: [phone]

## 2024-01-10 ENCOUNTER — TELEPHONE (OUTPATIENT)
Dept: INTERNAL MEDICINE CLINIC | Facility: CLINIC | Age: 64
End: 2024-01-10

## 2024-01-14 NOTE — TELEPHONE ENCOUNTER
I tried to apply on CMM and got this notice    Information regarding your request  The requested medication is not covered under the [employer] formulary. These non-covered drugs have available covered options in the same therapeutic class which are listed along with information on the formulary exception process for [employer] online: <[email or web address]> The full approved drug list is available at: <[email or web address]>    We need to call [employer] to see

## 2024-01-16 NOTE — TELEPHONE ENCOUNTER
I spoke to Nick at [facility] and we cannot do a PA for Zepbound  He is transferring me to clinical to see what the alternatives are - I did mention to him the alternatives are usually wegovy, saxenda, qsymia which we cannot give her.   He will have me speak to clinical.  I spoke to Maria A at [facility] and she said the alternatives wegovy and saxenda are approved but we cannot get approval for zepbound as it is too new and not on formulary - I explained we cannot get those 2 meds - it does not matter because the zepbound is not on formulary.

## 2024-01-16 NOTE — TELEPHONE ENCOUNTER
I called [facility] Sania and I need to call [facility] FEP at   Patient ID      [id number]

## 2024-02-08 NOTE — TELEPHONE ENCOUNTER
Chronic, controlled. Latest blood pressure and vitals reviewed-      .   Home meds for hypertension were reviewed and noted below.   Hypertension Medications               lisinopriL-hydrochlorothiazide (PRINZIDE,ZESTORETIC) 20-12.5 mg per tablet Take 1 tablet by mouth once daily.            While in the hospital, will manage blood pressure as follows; Continue home antihypertensive regimen    Will utilize p.r.n. blood pressure medication only if patient's  systolic blood pressure greater than 175 and he develops symptoms such as worsening chest pain or shortness of breath.   Rx Request  LEVOTHYROXINE SODIUM 125 MCG Oral Tab    Disp:     90               R: 0      Associated Dx: Hashimoto's thyroiditis    Last Visit: 08/08/2018    Last Refilled: 08/15/2018

## 2024-02-19 ENCOUNTER — LAB ENCOUNTER (OUTPATIENT)
Dept: LAB | Age: 64
End: 2024-02-19
Attending: INTERNAL MEDICINE
Payer: COMMERCIAL

## 2024-02-19 DIAGNOSIS — R60.0 LOCALIZED EDEMA: ICD-10-CM

## 2024-02-19 DIAGNOSIS — E78.00 PURE HYPERCHOLESTEROLEMIA: Primary | ICD-10-CM

## 2024-02-19 DIAGNOSIS — E78.1 PURE HYPERGLYCERIDEMIA: ICD-10-CM

## 2024-02-19 LAB
ALBUMIN SERPL-MCNC: 3.6 G/DL (ref 3.4–5)
ALBUMIN/GLOB SERPL: 0.9 {RATIO} (ref 1–2)
ALP LIVER SERPL-CCNC: 97 U/L
ALT SERPL-CCNC: 25 U/L
ANION GAP SERPL CALC-SCNC: 5 MMOL/L (ref 0–18)
AST SERPL-CCNC: 21 U/L (ref 15–37)
BILIRUB SERPL-MCNC: 0.4 MG/DL (ref 0.1–2)
BUN BLD-MCNC: 19 MG/DL (ref 9–23)
CALCIUM BLD-MCNC: 9.6 MG/DL (ref 8.5–10.1)
CHLORIDE SERPL-SCNC: 106 MMOL/L (ref 98–112)
CHOLEST SERPL-MCNC: 130 MG/DL (ref ?–200)
CO2 SERPL-SCNC: 28 MMOL/L (ref 21–32)
CREAT BLD-MCNC: 0.77 MG/DL
EGFRCR SERPLBLD CKD-EPI 2021: 87 ML/MIN/1.73M2 (ref 60–?)
FASTING PATIENT LIPID ANSWER: YES
FASTING STATUS PATIENT QL REPORTED: YES
GLOBULIN PLAS-MCNC: 3.8 G/DL (ref 2.8–4.4)
GLUCOSE BLD-MCNC: 91 MG/DL (ref 70–99)
HDLC SERPL-MCNC: 68 MG/DL (ref 40–59)
LDLC SERPL CALC-MCNC: 47 MG/DL (ref ?–100)
NONHDLC SERPL-MCNC: 62 MG/DL (ref ?–130)
OSMOLALITY SERPL CALC.SUM OF ELEC: 290 MOSM/KG (ref 275–295)
POTASSIUM SERPL-SCNC: 4.2 MMOL/L (ref 3.5–5.1)
PROT SERPL-MCNC: 7.4 G/DL (ref 6.4–8.2)
SODIUM SERPL-SCNC: 139 MMOL/L (ref 136–145)
TRIGL SERPL-MCNC: 73 MG/DL (ref 30–149)
TSI SER-ACNC: 0.86 MIU/ML (ref 0.36–3.74)
VLDLC SERPL CALC-MCNC: 10 MG/DL (ref 0–30)

## 2024-02-19 PROCEDURE — 84443 ASSAY THYROID STIM HORMONE: CPT

## 2024-02-19 PROCEDURE — 36415 COLL VENOUS BLD VENIPUNCTURE: CPT

## 2024-02-19 PROCEDURE — 80053 COMPREHEN METABOLIC PANEL: CPT

## 2024-02-19 PROCEDURE — 80061 LIPID PANEL: CPT

## 2024-02-21 ENCOUNTER — HOSPITAL ENCOUNTER (OUTPATIENT)
Dept: MAMMOGRAPHY | Age: 64
Discharge: HOME | End: 2024-02-21
Attending: FAMILY MEDICINE
Payer: COMMERCIAL

## 2024-02-21 DIAGNOSIS — Z12.31 ENCOUNTER FOR SCREENING MAMMOGRAM FOR MALIGNANT NEOPLASM OF BREAST: ICD-10-CM

## 2024-02-21 PROCEDURE — 77063 BREAST TOMOSYNTHESIS BI: CPT | Performed by: FAMILY MEDICINE

## 2024-02-21 PROCEDURE — 77067 SCR MAMMO BI INCL CAD: CPT | Performed by: FAMILY MEDICINE

## 2024-02-22 DIAGNOSIS — Z51.81 THERAPEUTIC DRUG MONITORING: ICD-10-CM

## 2024-02-22 DIAGNOSIS — E66.3 OVERWEIGHT (BMI 25.0-29.9): ICD-10-CM

## 2024-02-22 RX ORDER — DIETHYLPROPION HYDROCHLORIDE 75 MG/1
1 TABLET ORAL EVERY MORNING
Qty: 30 TABLET | Refills: 2 | Status: SHIPPED
Start: 2024-02-22

## 2024-02-22 NOTE — TELEPHONE ENCOUNTER
Requesting   Requested Prescriptions     Pending Prescriptions Disp Refills    Diethylpropion HCl ER 75 MG Oral Tablet 24 Hr 30 tablet 2     Sig: Take 1 tablet (75 mg total) by mouth every morning.      LOV: 12/6/23  RTC: n/a  Last Relevant Labs: n/a  Filled: 12/7/23 #30 with 2 refills    DIETHYLPROPION HCL 01/19/2024 12/07/2023 75 mg 30  30 [facility]     Patient notified via [employer] future appointment is needed.      Future Appointments   Date Time Provider Department Center   4/17/2024  2:30 PM Ila Gifford DO [facility]

## 2024-03-01 DIAGNOSIS — Z51.81 THERAPEUTIC DRUG MONITORING: ICD-10-CM

## 2024-03-01 DIAGNOSIS — R73.03 PREDIABETES: ICD-10-CM

## 2024-03-04 NOTE — TELEPHONE ENCOUNTER
Requesting   Requested Prescriptions     Pending Prescriptions Disp Refills    metFORMIN HCl 1000 MG Oral Tab 180 tablet 0     Sig: Take 1 tablet (1,000 mg total) by mouth 2 (two) times daily with meals.     LOV: 12/6/23  RTC: 3 months  Filled: 12/14/23 #180 with 0 refills    Future Appointments   Date Time Provider Department Center   4/17/2024  2:30 PM Ila Gifford DO [facility]   6/17/2024 11:40 AM Chrissie Vega APRN [facility]

## 2024-03-27 ENCOUNTER — PATIENT MESSAGE (OUTPATIENT)
Dept: INTERNAL MEDICINE CLINIC | Facility: CLINIC | Age: 64
End: 2024-03-27

## 2024-03-28 DIAGNOSIS — R45.86 LABILE MOOD: ICD-10-CM

## 2024-03-28 RX ORDER — VENLAFAXINE HYDROCHLORIDE 75 MG/1
75 CAPSULE, EXTENDED RELEASE ORAL EVERY MORNING
Qty: 90 CAPSULE | Refills: 0 | Status: SHIPPED | OUTPATIENT
Start: 2024-03-28

## 2024-03-28 NOTE — TELEPHONE ENCOUNTER
A refill request was received for:  Requested Prescriptions     Pending Prescriptions Disp Refills    VENLAFAXINE ER 75 MG Oral Capsule SR 24 Hr [Pharmacy Med Name: Venlafaxine HCl ER Oral Capsule Extended Release 24 Hour 75 MG] 90 capsule 0     Sig: TAKE 1 CAPSULE BY MOUTH IN THE MORNING       Last refill date:   10-2-23    Last office visit: 10-2-23    Follow up due:  Future Appointments   Date Time Provider Department Center   4/17/2024  2:30 PM Ila Gifford DO [facility]   6/17/2024 11:40 AM Chrissie Vega APRN [facility]

## 2024-03-28 NOTE — TELEPHONE ENCOUNTER
Pharmacy left message to obtain ICD 10 code for zepbound      Overweight (BMI 25.0-29.9)  E66.3     BMI 28.55    I called [facility] and left voicemail with code and BMI today.

## 2024-03-29 RX ORDER — TIRZEPATIDE 2.5 MG/.5ML
2.5 INJECTION, SOLUTION SUBCUTANEOUS WEEKLY
Qty: 2 ML | Refills: 0 | OUTPATIENT
Start: 2024-03-29

## 2024-03-29 NOTE — TELEPHONE ENCOUNTER
Requesting   Requested Prescriptions     Pending Prescriptions Disp Refills    ZEPBOUND 2.5 MG/0.5ML Subcutaneous Solution Auto-injector [Pharmacy Med Name: ZEPBOUND 2.5MG/0.5ML INJ (4PF PENS)] 2 mL 0     Sig: INJECT 2.5MG INTO THE SKIN ONCE A WEEK       LOV: 12/06/2024  RTC: in about 3 months  Filled: 01/05/2024 #2ml with 0 refills    Future Appointments   Date Time Provider Department Center   4/17/2024  2:30 PM Ila Gifford DO [facility]   6/17/2024 11:40 AM Chrissie Vega APRN [facility]     Waiting for pt response

## 2024-04-09 RX ORDER — HYDROCHLOROTHIAZIDE 12.5 MG/1
12.5 TABLET ORAL DAILY
Qty: 90 TABLET | Refills: 0 | Status: SHIPPED | OUTPATIENT
Start: 2024-04-09 | End: 2024-04-09

## 2024-04-17 ENCOUNTER — OFFICE VISIT (OUTPATIENT)
Dept: FAMILY MEDICINE CLINIC | Facility: CLINIC | Age: 64
End: 2024-04-17
Payer: COMMERCIAL

## 2024-04-17 VITALS
WEIGHT: 201 LBS | SYSTOLIC BLOOD PRESSURE: 128 MMHG | HEIGHT: 67 IN | HEART RATE: 78 BPM | OXYGEN SATURATION: 98 % | RESPIRATION RATE: 16 BRPM | BODY MASS INDEX: 31.55 KG/M2 | DIASTOLIC BLOOD PRESSURE: 82 MMHG

## 2024-04-17 DIAGNOSIS — Z01.419 WELL WOMAN EXAM WITH ROUTINE GYNECOLOGICAL EXAM: Primary | ICD-10-CM

## 2024-04-17 DIAGNOSIS — Z00.00 ANNUAL PHYSICAL EXAM: ICD-10-CM

## 2024-04-17 DIAGNOSIS — Z51.81 THERAPEUTIC DRUG MONITORING: ICD-10-CM

## 2024-04-17 DIAGNOSIS — Z12.11 COLON CANCER SCREENING: ICD-10-CM

## 2024-04-17 DIAGNOSIS — R45.86 LABILE MOOD: ICD-10-CM

## 2024-04-17 DIAGNOSIS — Z13.820 SCREENING FOR OSTEOPOROSIS: ICD-10-CM

## 2024-04-17 DIAGNOSIS — R73.03 PREDIABETES: ICD-10-CM

## 2024-04-17 DIAGNOSIS — E03.9 HYPOTHYROIDISM (ACQUIRED): ICD-10-CM

## 2024-04-17 DIAGNOSIS — D22.9 ATYPICAL NEVI: ICD-10-CM

## 2024-04-17 DIAGNOSIS — Z12.31 ENCOUNTER FOR SCREENING MAMMOGRAM FOR HIGH-RISK PATIENT: ICD-10-CM

## 2024-04-17 PROCEDURE — 99396 PREV VISIT EST AGE 40-64: CPT | Performed by: FAMILY MEDICINE

## 2024-04-17 PROCEDURE — 3074F SYST BP LT 130 MM HG: CPT | Performed by: FAMILY MEDICINE

## 2024-04-17 PROCEDURE — 3079F DIAST BP 80-89 MM HG: CPT | Performed by: FAMILY MEDICINE

## 2024-04-17 PROCEDURE — 3008F BODY MASS INDEX DOCD: CPT | Performed by: FAMILY MEDICINE

## 2024-04-17 PROCEDURE — 99214 OFFICE O/P EST MOD 30 MIN: CPT | Performed by: FAMILY MEDICINE

## 2024-04-17 RX ORDER — VENLAFAXINE HYDROCHLORIDE 75 MG/1
75 CAPSULE, EXTENDED RELEASE ORAL EVERY MORNING
Qty: 90 CAPSULE | Refills: 0 | Status: SHIPPED | OUTPATIENT
Start: 2024-04-17

## 2024-04-17 RX ORDER — HYDROCHLOROTHIAZIDE 12.5 MG/1
12.5 TABLET ORAL DAILY
Qty: 90 TABLET | Refills: 0 | Status: SHIPPED | OUTPATIENT
Start: 2024-04-17

## 2024-04-17 RX ORDER — LEVOTHYROXINE SODIUM 88 UG/1
88 TABLET ORAL
Qty: 90 TABLET | Refills: 1 | Status: SHIPPED | OUTPATIENT
Start: 2024-04-17

## 2024-04-17 NOTE — PROGRESS NOTES
HPI:   Jimena Goel is a 64 year old female who presents for a complete physical exam.     Wt Readings from Last 6 Encounters:   04/17/24 201 lb (91.2 kg)   01/08/24 185 lb (83.9 kg)   01/04/24 185 lb (83.9 kg)   12/06/23 185 lb (83.9 kg)   10/02/23 191 lb (86.6 kg)   09/12/23 184 lb (83.5 kg)     Body mass index is 31.48 kg/m².     Cholesterol, Total (mg/dL)   Date Value   02/19/2024 130   07/28/2023 135   11/21/2022 105   11/21/2013 156   11/21/2012 165   10/01/2011 162     Cholesterol (mg/dL)   Date Value   02/27/2018 232 (H)   12/29/2016 243 (H)     HDL Cholesterol (mg/dL)   Date Value   02/19/2024 68 (H)   07/28/2023 71 (H)   11/21/2022 56   11/21/2013 40   11/21/2012 47   10/01/2011 39 (L)     Direct HDL (mg/dL)   Date Value   02/27/2018 43 (L)   12/29/2016 52     LDL Cholesterol Calc (mg/dL)   Date Value   11/21/2013 74   11/21/2012 82   10/01/2011 77     LDL Cholesterol (mg/dL)   Date Value   02/19/2024 47   07/28/2023 48   11/21/2022 31     Calculated LDL (mg/dL)   Date Value   02/27/2018 135 (H)   12/29/2016 133.0 (H)     Triglycerides (mg/dL)   Date Value   11/21/2013 210 (H)   11/21/2012 179 (H)   10/01/2011 229 (H)     AST (SGOT) (IU/L)   Date Value   11/21/2013 17   11/21/2012 18   10/01/2011 12     AST (U/L)   Date Value   02/19/2024 21   07/28/2023 21   11/21/2022 17   02/27/2018 18   12/29/2016 16     ALT (SGPT) (IU/L)   Date Value   11/21/2013 16   11/21/2012 12   10/01/2011 12     ALT (U/L)   Date Value   02/19/2024 25   07/28/2023 29   11/21/2022 23   02/27/2018 25   12/29/2016 22     last pap- due   all previous paps normal  No vaginal discharge  No bladder dysfunction  No vaginal bleeding   Some performing self breast exams  last mammogram- 2021  dexa- 2021  C-scope - UTD  + calcium and vit D supplementation  No family history of ovarian cancer  MGM breast cancer  Brother colon cancer     Current Outpatient Medications   Medication Sig Dispense Refill    hydroCHLOROthiazide 12.5 MG Oral Tab  Take 1 tablet (12.5 mg total) by mouth daily. 90 tablet 0    venlafaxine ER 75 MG Oral Capsule SR 24 Hr Take 1 capsule (75 mg total) by mouth every morning. 90 capsule 0    metFORMIN HCl 1000 MG Oral Tab Take 1 tablet (1,000 mg total) by mouth 2 (two) times daily with meals. 180 tablet 0    Diethylpropion HCl ER 75 MG Oral Tablet 24 Hr Take 1 tablet (75 mg total) by mouth every morning. 30 tablet 2    levothyroxine 88 MCG Oral Tab Take 1 tablet (88 mcg total) by mouth before breakfast. 90 tablet 1    Coenzyme Q10 (CO Q 10 OR) Take by mouth.      BIOTIN OR Take by mouth daily.        BABY ASPIRIN OR daily.        atorvastatin 20 MG Oral Tab Take 1 tablet (20 mg total) by mouth nightly.        Past Medical History:    ANXIETY    Anxiety    DEPRESSION    Depression    High cholesterol    HYPOTHYROIDISM    Night sweats    Thyroid disease    Weight gain      Past Surgical History:   Procedure Laterality Date    Back surgery            X 2      Family History   Problem Relation Age of Onset    Breast Cancer Maternal Grandmother 53    Diabetes Maternal Grandmother     Thyroid disease Mother     Heart Disease Father     Hypertension Father     Heart Attack Father     Hypertension Maternal Grandfather     Heart Attack Maternal Grandfather     Colon Cancer Brother     Heart Attack Paternal Uncle     Genetic Disease Paternal Grandfather       Social History:   Social History     Socioeconomic History    Marital status:     Number of children: 2   Tobacco Use    Smoking status: Former     Current packs/day: 0.00     Types: Cigarettes     Quit date:      Years since quittin.2    Smokeless tobacco: Never    Tobacco comments:     5-10 per week    Vaping Use    Vaping status: Never Used   Substance and Sexual Activity    Alcohol use: Yes     Alcohol/week: 0.0 standard drinks of alcohol     Comment: occasional    Drug use: No   Other Topics Concern     Service No    Blood Transfusions No    Caffeine  Concern Yes     Comment: 2 cups per day     Occupational Exposure No    Hobby Hazards No    Sleep Concern No    Stress Concern Yes    Weight Concern Yes     Comment: would like to lose    Special Diet No    Back Care No    Exercise No    Bike Helmet Yes    Seat Belt Yes    Self-Exams Yes     Social Determinants of Health     Physical Activity: Sufficiently Active (12/17/2019)    Received from [facility]    Exercise Vital Sign     Days of Exercise per Week: 2 days     Minutes of Exercise per Session: 90 min     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   Diet: watches minimally     REVIEW OF SYSTEMS:   GENERAL: feels well otherwise  SKIN: denies any unusual skin lesions  EYES:denies blurred vision or double vision  HEENT: denies nasal congestion, sinus pain or ST  LUNGS: denies shortness of breath with exertion  CARDIOVASCULAR: denies chest pain on exertion  GI: denies abdominal pain,denies heartburn  : denies dysuria, vaginal discharge or itchin   MUSCULOSKELETAL: denies back pain  NEURO: denies headaches  PSYCHE: denies depression or anxiety  HEMATOLOGIC: denies hx of anemia  ENDOCRINE:  thyroid history  ALL/ASTHMA: denies hx of allergy or asthma    EXAM:   /82   Pulse 78   Resp 16   Ht 5' 7\" (1.702 m)   Wt 201 lb (91.2 kg)   SpO2 98%   BMI 31.48 kg/m²   Body mass index is 31.48 kg/m².   GENERAL: alert and oriented X 3, well developed, well nourished,in no apparent distress  CARDIO: RRR without murmur  LUNGS: clear to auscultation  NECK: supple,no adenopathy,no thyromegaly  HEENT: atraumatic, normocephalic,ears and throat are clear  EYES:PERRLA, EOMI, normal,conjunctiva are clear  SKIN: norashes,no suspicious lesions  GI: good BS's,no masses, HSM or tenderness  CHEST: no chest tenderness  BREAST: no axillary LAD, no masses no nipple discharge bilaterally  : external genitalia - no inguinal LAD, no lesions.    Speculum exam-  introitus is normal,scant discharge,cervix is pink.   Bimanual exam- no adnexal masses or tenderness   RECTAL:good rectal tone,no mass, brown stool, stool is OB negative  MUSCULOSKELETAL: back is not tender,FROM of the back  EXTREMITIES: no cyanosis, clubbing or edema  NEURO: cranial nerves are intact,motor and sensory are grossly intact    ASSESSMENT AND PLAN:   Jimena Goel is a 64 year old female who presents for a well woman exam.     1. Well woman exam with routine gynecological exam    - THINPREP TIS AND HPV MRNA E6/E7 [65333] [Q]    2. Annual physical exam    - Vitamin D [E]; Future  - Free T4, (Free Thyroxine); Future  - Assay, Thyroid Stim Hormone; Future  - Lipid Panel; Future  - CBC With Differential With Platelet; Future  - Vitamin B12; Future  - Comp Metabolic Panel (14); Future  - Hemoglobin A1C; Future    3. Encounter for screening mammogram for high-risk patient    - [facility] JOSE 2D+3D SCREENING BILAT (CPT=77067/24303); Future    4. Screening for osteoporosis    - XR DEXA BONE DENSITOMETRY (CPT=77080); Future    5. Colon cancer screening    - Gastro Referral - In Network    6. Hypothyroidism (acquired)    - levothyroxine 88 MCG Oral Tab; Take 1 tablet (88 mcg total) by mouth before breakfast.  Dispense: 90 tablet; Refill: 1    10. Atypical nevi    - Derm Referral - In Network      Discussed diet, exercise,calcium, vitamin D, fish oil and self breast exams. Questions answered and patient indicates understanding of these issues and agrees to the plan.  Follow up in 1 mo or sooner if needed      HPI:   Jimena Goel is a 64 year old female here for medication follow up.     Pt has h/o hashimoto's hypothyroid     Pt had a high calcium score on her heart scan.  s/p cards eval ; now on asa / statin / co q 10  Sees Dr. Barba  Patient denies chest pain, shortness of breath, dizziness, and lightheadedness. No exertional symptoms.    Mood good overall   Some family stressors   Well controlled on the effexor    No anxiety   No depressed   Pt denies suicidal or homicidal ideation.   Pt denies hopelessness or anhedonia.   Sleep ok    Normal appetite  Pt is working out   Eating healthy    Trouble getting meds rx by the weight loss clinic   Open to compounded ozempic - it was very helpful in the past       Current Outpatient Medications   Medication Sig Dispense Refill    hydroCHLOROthiazide 12.5 MG Oral Tab Take 1 tablet (12.5 mg total) by mouth daily. 90 tablet 0    venlafaxine ER 75 MG Oral Capsule SR 24 Hr Take 1 capsule (75 mg total) by mouth every morning. 90 capsule 0    metFORMIN HCl 1000 MG Oral Tab Take 1 tablet (1,000 mg total) by mouth 2 (two) times daily with meals. 180 tablet 0    Diethylpropion HCl ER 75 MG Oral Tablet 24 Hr Take 1 tablet (75 mg total) by mouth every morning. 30 tablet 2    levothyroxine 88 MCG Oral Tab Take 1 tablet (88 mcg total) by mouth before breakfast. 90 tablet 1    Coenzyme Q10 (CO Q 10 OR) Take by mouth.      BIOTIN OR Take by mouth daily.        BABY ASPIRIN OR daily.        atorvastatin 20 MG Oral Tab Take 1 tablet (20 mg total) by mouth nightly.        Past Medical History:    ANXIETY    Anxiety    DEPRESSION    Depression    High cholesterol    HYPOTHYROIDISM    Night sweats    Thyroid disease    Weight gain      Past Surgical History:   Procedure Laterality Date    Back surgery            X 2      Family History   Problem Relation Age of Onset    Breast Cancer Maternal Grandmother 53    Diabetes Maternal Grandmother     Thyroid disease Mother     Heart Disease Father     Hypertension Father     Heart Attack Father     Hypertension Maternal Grandfather     Heart Attack Maternal Grandfather     Colon Cancer Brother     Heart Attack Paternal Uncle     Genetic Disease Paternal Grandfather       Social History:   Social History     Socioeconomic History    Marital status:     Number of children: 2   Tobacco Use    Smoking status: Former     Current packs/day: 0.00      Types: Cigarettes     Quit date:      Years since quittin.2    Smokeless tobacco: Never    Tobacco comments:     5-10 per week    Vaping Use    Vaping status: Never Used   Substance and Sexual Activity    Alcohol use: Yes     Alcohol/week: 0.0 standard drinks of alcohol     Comment: occasional    Drug use: No   Other Topics Concern     Service No    Blood Transfusions No    Caffeine Concern Yes     Comment: 2 cups per day     Occupational Exposure No    Hobby Hazards No    Sleep Concern No    Stress Concern Yes    Weight Concern Yes     Comment: would like to lose    Special Diet No    Back Care No    Exercise No    Bike Helmet Yes    Seat Belt Yes    Self-Exams Yes     Social Determinants of Health     Physical Activity: Sufficiently Active (2019)    [place] [facility]    Exercise Vital Sign     Days of Exercise per Week: 2 days     Minutes of Exercise per Session: 90 min     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  Diet: watches calories closely     REVIEW OF SYSTEMS:   GENERAL: feels well otherwise  SKIN: denies any unusual skin lesions  EYES:denies blurred vision or double vision  HEENT: denies nasal congestion, sinus pain or ST  LUNGS: denies shortness of breath with exertion  CARDIOVASCULAR: denies chest pain on exertion  GI: denies abdominal pain,denies heartburn  : denies dysuria, vaginal discharge or itching  MUSCULOSKELETAL: denies back pain  NEURO: denies headaches  PSYCHE: denies depression or anxiety  HEMATOLOGIC: denies hx of anemia  ENDOCRINE:  thyroid history  ALL/ASTHMA: denies hx of allergy or asthma    EXAM:   /82   Pulse 78   Resp 16   Ht 5' 7\" (1.702 m)   Wt 201 lb (91.2 kg)   SpO2 98%   BMI 31.48 kg/m²   Body mass index is 31.48 kg/m².   GENERAL: alert and oriented X 3, well developed, well nourished,in no apparent distress  NEURO: cranial nerves are intact,motor and sensory  are grossly intact  PSYCH: normal mood and affect, good eye contact appropriate     ASSESSMENT AND PLAN:   Jimena Goel is a 64 year old female here with   7. Prediabetes  Rx sent for compounded ozempic   - metFORMIN HCl 1000 MG Oral Tab; Take 1 tablet (1,000 mg total) by mouth 2 (two) times daily with meals.  Dispense: 180 tablet; Refill: 0    8. Therapeutic drug monitoring    - metFORMIN HCl 1000 MG Oral Tab; Take 1 tablet (1,000 mg total) by mouth 2 (two) times daily with meals.  Dispense: 180 tablet; Refill: 0    9. Labile mood    - venlafaxine ER 75 MG Oral Capsule SR 24 Hr; Take 1 capsule (75 mg total) by mouth every morning.  Dispense: 90 capsule; Refill: 0      Questions answered and patient indicates understanding of these issues and agrees to the plan.  Follow up in 1 mo or sooner if needed.

## 2024-04-19 LAB — HPV MRNA E6/E7: NOT DETECTED

## 2024-05-11 ENCOUNTER — HOSPITAL ENCOUNTER (EMERGENCY)
Facility: HOSPITAL | Age: 64
Discharge: HOME | End: 2024-05-11
Attending: EMERGENCY MEDICINE

## 2024-05-11 VITALS
TEMPERATURE: 98 F | WEIGHT: 195 LBS | RESPIRATION RATE: 18 BRPM | HEART RATE: 78 BPM | SYSTOLIC BLOOD PRESSURE: 163 MMHG | BODY MASS INDEX: 30.25 KG/M2 | OXYGEN SATURATION: 98 % | HEIGHT: 67.5 IN | DIASTOLIC BLOOD PRESSURE: 97 MMHG

## 2024-05-11 DIAGNOSIS — K04.7 DENTAL ABSCESS: Primary | ICD-10-CM

## 2024-05-11 PROCEDURE — 99284 EMERGENCY DEPT VISIT MOD MDM: CPT

## 2024-05-11 PROCEDURE — 99283 EMERGENCY DEPT VISIT LOW MDM: CPT

## 2024-05-11 RX ORDER — IBUPROFEN 400 MG/1
400 TABLET ORAL
COMMUNITY
Start: 2024-03-14 | End: 2024-05-20

## 2024-05-11 RX ORDER — CHLORHEXIDINE GLUCONATE ORAL RINSE 1.2 MG/ML
15 SOLUTION DENTAL 2 TIMES DAILY
Qty: 210 ML | Refills: 0 | Status: SHIPPED | OUTPATIENT
Start: 2024-05-11 | End: 2024-05-20

## 2024-05-11 RX ORDER — PENICILLIN V POTASSIUM 500 MG/1
500 TABLET ORAL 2 TIMES DAILY
Qty: 14 TABLET | Refills: 0 | Status: ON HOLD | OUTPATIENT
Start: 2024-05-11 | End: 2024-05-17

## 2024-05-11 NOTE — ED PROVIDER NOTES
Patient Seen in: [facility] Emergency Department      History     Chief Complaint   Patient presents with    Dental Problem     Stated Complaint: dental pain and swelling    Subjective:   HPI    64-year-old female presents reporting pain to the left lower posterior molar.  She had a dental cleaning 2 days ago and started feeling pain to the area shortly thereafter.  They called the dental office and were instructed to take ibuprofen and Tylenol.  She denies any fevers.  She had trouble sleeping last night due to pain.  She says the pain medication did not help.    Objective:   Past Medical History:    ANXIETY    Anxiety    DEPRESSION    Depression    High cholesterol    HYPOTHYROIDISM    Night sweats    Thyroid disease    Weight gain              Past Surgical History:   Procedure Laterality Date    Back surgery            X 2                Social History     Socioeconomic History    Marital status:     Number of children: 2   Tobacco Use    Smoking status: Former     Current packs/day: 0.00     Types: Cigarettes     Quit date:      Years since quittin.3    Smokeless tobacco: Never    Tobacco comments:     5-10 per week    Vaping Use    Vaping status: Every Day   Substance and Sexual Activity    Alcohol use: Yes     Alcohol/week: 0.0 standard drinks of alcohol     Comment: occasional    Drug use: No   Other Topics Concern     Service No    Blood Transfusions No    Caffeine Concern Yes     Comment: 2 cups per day     Occupational Exposure No    Hobby Hazards No    Sleep Concern No    Stress Concern Yes    Weight Concern Yes     Comment: would like to lose    Special Diet No    Back Care No    Exercise No    Bike Helmet Yes    Seat Belt Yes    Self-Exams Yes     Social Determinants of Health     Physical Activity: Sufficiently Active (2019)    [facility]    Exercise Vital Sign     Days of Exercise per Week: 2 days      Minutes of Exercise per Session: 90 min              Review of Systems    Positive for stated complaint: dental pain and swelling  Other systems are as noted in HPI.  Constitutional and vital signs reviewed.      All other systems reviewed and negative except as noted above.    Physical Exam     ED Triage Vitals [05/11/24 0955]   BP (!) 163/97   Pulse 78   Resp 18   Temp 97.6 °F (36.4 °C)   Temp src Temporal   SpO2 98 %   O2 Device None (Room air)       Current Vitals:   Vital Signs  BP: (!) 163/97  Pulse: 78  Resp: 18  Temp: 97.6 °F (36.4 °C)  Temp src: Temporal  MAP (mmHg): (!) 119    Oxygen Therapy  SpO2: 98 %  O2 Device: None (Room air)            Physical Exam    General:  Vitals as listed.  No acute distress   HEENT: There is mild swelling in the area of the left lower posterior molar.  There is tenderness on palpation of the molar.  No fluctuance appreciated.  No facial cellulitis.  Uvula is midline.  No significant posterior pharyngeal swelling.  Neck: No palpable lymphadenopathy  Neuro: Awake and alert.  Moving all extremities.  Skin: no rashes or nodules    ED Course   Labs Reviewed - No data to display                   MDM      64-year-old female presents with dental pain after cleaning.  She has some swelling to the mandible in the area of the left lower posterior molar.  Tenderness on palpation of the tooth    Additional history obtained by patient's  reports that they called the dentist office and were instructed to take ibuprofen and Tylenol but it has not helped her discomfort.  I personally talked to also discussed that there could be an early infection.    Differential includes but is not limited to dental caries, dental fracture, dental abscess, a life threat.    On examination there is tenderness on palpation of the tooth with some mild swelling to the gum and mandible area.  No significant swelling.  No facial cellulitis.  Appears consistent with possible dental abscess.  Will start on  penicillin and discharged with chlorhexidine as well.  We discussed pain management but she does not want to take anything stronger than ibuprofen or Tylenol so we discussed optimization of OTC pain medication.  I will also provide oral surgery clinic information for further follow-up as needed.                                       Medical Decision Making      Disposition and Plan     Clinical Impression:  1. Dental abscess         Disposition:  Discharge  5/11/2024 11:10 am    Follow-up:  Juan Miguel Segovia, LYUBOV  [address] [facility] [address]  [phone]    Follow up  Oral surgery for further management, As needed          Medications Prescribed:  Current Discharge Medication List        START taking these medications    Details   penicillin v potassium 500 MG Oral Tab Take 1 tablet (500 mg total) by mouth 2 (two) times daily for 7 days.  Qty: 14 tablet, Refills: 0      chlorhexidine gluconate 0.12 % Mouth/Throat Solution Use as directed 15 mL in the mouth or throat 2 (two) times daily for 7 days.  Qty: 210 mL, Refills: 0

## 2024-05-11 NOTE — ED QUICK NOTES
Pt resting comfortably, pt denies difficulty breathing secondary to the swelling in her left jaw. Pt does state it is painful to swallow d/t her left jaw pain.    Family at bedside.

## 2024-05-11 NOTE — ED INITIAL ASSESSMENT (HPI)
Pt here due to left sided jaw pain. Pt stated that she had her wisdom teeth removed about two months ago and then had a cleaning this past Thursday and then late last night the pain started with swelling to her left lower jaw. Pt denies any other complaint and states that the pain is localized to that left lower jaw.

## 2024-05-16 ENCOUNTER — HOSPITAL ENCOUNTER (EMERGENCY)
Facility: HOSPITAL | Age: 64
Discharge: LEFT BEFORE DISCHARGE | End: 2024-05-16

## 2024-05-16 VITALS
TEMPERATURE: 97 F | HEART RATE: 77 BPM | SYSTOLIC BLOOD PRESSURE: 123 MMHG | DIASTOLIC BLOOD PRESSURE: 80 MMHG | RESPIRATION RATE: 20 BRPM

## 2024-05-16 RX ORDER — AMOXICILLIN AND CLAVULANATE POTASSIUM 875; 125 MG/1; MG/1
1 TABLET, FILM COATED ORAL 2 TIMES DAILY
COMMUNITY
End: 2024-05-20

## 2024-05-17 ENCOUNTER — HOSPITAL ENCOUNTER (INPATIENT)
Facility: HOSPITAL | Age: 64
LOS: 3 days | Discharge: HOME | DRG: 159 | End: 2024-05-20
Attending: STUDENT IN AN ORGANIZED HEALTH CARE EDUCATION/TRAINING PROGRAM | Admitting: INTERNAL MEDICINE

## 2024-05-17 DIAGNOSIS — M27.2 OSTEOMYELITIS, JAW ACUTE: Primary | ICD-10-CM

## 2024-05-17 PROBLEM — M86.9 OSTEOMYELITIS (HCC): Status: ACTIVE | Noted: 2024-05-17

## 2024-05-17 LAB
ALBUMIN SERPL-MCNC: 3.9 G/DL (ref 3.4–5)
ALBUMIN/GLOB SERPL: 1 {RATIO} (ref 1–2)
ALP LIVER SERPL-CCNC: 84 U/L
ALT SERPL-CCNC: 25 U/L
ANION GAP SERPL CALC-SCNC: 8 MMOL/L (ref 0–18)
AST SERPL-CCNC: 26 U/L (ref 15–37)
BASOPHILS # BLD AUTO: 0.06 X10(3) UL (ref 0–0.2)
BASOPHILS NFR BLD AUTO: 0.6 %
BILIRUB SERPL-MCNC: 0.3 MG/DL (ref 0.1–2)
BUN BLD-MCNC: 23 MG/DL (ref 9–23)
CALCIUM BLD-MCNC: 9.4 MG/DL (ref 8.5–10.1)
CHLORIDE SERPL-SCNC: 106 MMOL/L (ref 98–112)
CO2 SERPL-SCNC: 25 MMOL/L (ref 21–32)
CREAT BLD-MCNC: 0.97 MG/DL
EGFRCR SERPLBLD CKD-EPI 2021: 65 ML/MIN/1.73M2 (ref 60–?)
EOSINOPHIL # BLD AUTO: 0.11 X10(3) UL (ref 0–0.7)
EOSINOPHIL NFR BLD AUTO: 1.2 %
ERYTHROCYTE [DISTWIDTH] IN BLOOD BY AUTOMATED COUNT: 12.8 %
GLOBULIN PLAS-MCNC: 3.9 G/DL (ref 2.8–4.4)
GLUCOSE BLD-MCNC: 95 MG/DL (ref 70–99)
HCT VFR BLD AUTO: 41.1 %
HGB BLD-MCNC: 14.6 G/DL
IMM GRANULOCYTES # BLD AUTO: 0.04 X10(3) UL (ref 0–1)
IMM GRANULOCYTES NFR BLD: 0.4 %
LACTATE SERPL-SCNC: 0.7 MMOL/L (ref 0.4–2)
LYMPHOCYTES # BLD AUTO: 2.86 X10(3) UL (ref 1–4)
LYMPHOCYTES NFR BLD AUTO: 30 %
MCH RBC QN AUTO: 29.6 PG (ref 26–34)
MCHC RBC AUTO-ENTMCNC: 35.5 G/DL (ref 31–37)
MCV RBC AUTO: 83.2 FL
MONOCYTES # BLD AUTO: 0.45 X10(3) UL (ref 0.1–1)
MONOCYTES NFR BLD AUTO: 4.7 %
NEUTROPHILS # BLD AUTO: 6.01 X10 (3) UL (ref 1.5–7.7)
NEUTROPHILS # BLD AUTO: 6.01 X10(3) UL (ref 1.5–7.7)
NEUTROPHILS NFR BLD AUTO: 63.1 %
OSMOLALITY SERPL CALC.SUM OF ELEC: 291 MOSM/KG (ref 275–295)
PLATELET # BLD AUTO: 250 10(3)UL (ref 150–450)
POTASSIUM SERPL-SCNC: 3.6 MMOL/L (ref 3.5–5.1)
PROT SERPL-MCNC: 7.8 G/DL (ref 6.4–8.2)
RBC # BLD AUTO: 4.94 X10(6)UL
SODIUM SERPL-SCNC: 139 MMOL/L (ref 136–145)
WBC # BLD AUTO: 9.5 X10(3) UL (ref 4–11)

## 2024-05-17 PROCEDURE — 99223 1ST HOSP IP/OBS HIGH 75: CPT | Performed by: INTERNAL MEDICINE

## 2024-05-17 RX ORDER — HYDROCHLOROTHIAZIDE 12.5 MG/1
12.5 TABLET ORAL DAILY
Status: DISCONTINUED | OUTPATIENT
Start: 2024-05-18 | End: 2024-05-18

## 2024-05-17 RX ORDER — VENLAFAXINE HYDROCHLORIDE 75 MG/1
75 CAPSULE, EXTENDED RELEASE ORAL EVERY MORNING
Status: DISCONTINUED | OUTPATIENT
Start: 2024-05-18 | End: 2024-05-20

## 2024-05-17 RX ORDER — ATORVASTATIN CALCIUM 20 MG/1
20 TABLET, FILM COATED ORAL NIGHTLY
Status: DISCONTINUED | OUTPATIENT
Start: 2024-05-17 | End: 2024-05-20

## 2024-05-17 RX ORDER — LEVOTHYROXINE SODIUM 88 UG/1
88 TABLET ORAL
Status: DISCONTINUED | OUTPATIENT
Start: 2024-05-18 | End: 2024-05-20

## 2024-05-17 RX ORDER — MORPHINE SULFATE 4 MG/ML
1 INJECTION, SOLUTION INTRAMUSCULAR; INTRAVENOUS EVERY 2 HOUR PRN
Status: DISCONTINUED | OUTPATIENT
Start: 2024-05-17 | End: 2024-05-20

## 2024-05-17 RX ORDER — SODIUM CHLORIDE 9 MG/ML
INJECTION, SOLUTION INTRAVENOUS CONTINUOUS
Status: DISCONTINUED | OUTPATIENT
Start: 2024-05-17 | End: 2024-05-18

## 2024-05-17 RX ORDER — MORPHINE SULFATE 4 MG/ML
0.5 INJECTION, SOLUTION INTRAMUSCULAR; INTRAVENOUS EVERY 2 HOUR PRN
Status: DISCONTINUED | OUTPATIENT
Start: 2024-05-17 | End: 2024-05-20

## 2024-05-17 RX ORDER — HYDROCODONE BITARTRATE AND ACETAMINOPHEN 5; 325 MG/1; MG/1
1 TABLET ORAL EVERY 6 HOURS PRN
Status: DISCONTINUED | OUTPATIENT
Start: 2024-05-17 | End: 2024-05-20

## 2024-05-17 RX ORDER — ACETAMINOPHEN 500 MG
500 TABLET ORAL EVERY 6 HOURS PRN
Status: DISCONTINUED | OUTPATIENT
Start: 2024-05-17 | End: 2024-05-20

## 2024-05-17 RX ORDER — MORPHINE SULFATE 4 MG/ML
2 INJECTION, SOLUTION INTRAMUSCULAR; INTRAVENOUS EVERY 2 HOUR PRN
Status: DISCONTINUED | OUTPATIENT
Start: 2024-05-17 | End: 2024-05-20

## 2024-05-17 RX ORDER — SODIUM CHLORIDE 9 MG/ML
INJECTION, SOLUTION INTRAVENOUS CONTINUOUS
Status: ACTIVE | OUTPATIENT
Start: 2024-05-17 | End: 2024-05-17

## 2024-05-17 NOTE — PROGRESS NOTES
NURSING ADMISSION NOTE      Patient admitted via Cart  Oriented to room.  Safety precautions initiated.  Bed in low position.  Call light in reach.    Pt here with L lower jaw osteomyelitis, denies pain. AxO x4, very anxious, tearful, and stressed with diagnosis. RA. Voids. Up self. POC reviewed, call light within reach.

## 2024-05-17 NOTE — ED QUICK NOTES
Orders for admission, patient is aware of plan and ready to go upstairs. Any questions, please call ED RN Osiel at extension [phone].     Patient Covid vaccination status: Fully vaccinated     COVID Test Ordered in ED: None    COVID Suspicion at Admission: N/A    Running Infusions:      Mental Status/LOC at time of transport: Aox4    Other pertinent information:   CIWA score: N/A   NIH score:  N/A

## 2024-05-17 NOTE — ED QUICK NOTES
Pt reprots being on augmentin since Tuesday, noted redness and swelling in her phase and was here last ngiht but was falling asleep in waiting room so went home, was told by oral surgeon that she has osteomyelitis from CT performed in his office, reports swelling has gone down since then did not take augmentin today

## 2024-05-17 NOTE — ED PROVIDER NOTES
Patient Seen in: [facility] Emergency Department      History     Chief Complaint   Patient presents with    Other     Needs picc line     Stated Complaint: here to get PICC line placed, needs for infection in jaw    Subjective:   HPI    Patient is a 64-year-old female who presents emergency department reporting that she was seen by an oral surgeon who had done a CT scan in the office noting a life-threatening infection and advised her to come to the ER for IV antibiotics.  Patient had dental extraction performed of 2 teeth couple months ago.  More recently he had developed severe pain and swelling in the lower jaw in the area where the previous tooth was extracted from.  She was seen in the ER and started on penicillin.  She then followed up with oral surgery.  At the time of that evaluation CT imaging was ordered.  The physician also switched her prescription to Augmentin.  CT imaging was then performed demonstrating retained root tips in the lower left mandible with signs of osteomyelitis and the patient was advised to come in for an urgent extraction at the office and to be set up for a PICC line.  Patient felt really overwhelmed by this diagnosis and did not have any family available at home to support her through that therefore she asked to delay.  She now presents for further care.    Details about CT imaging results discussed over the phone with Dr. Segovia's office.    Objective:   Past Medical History:    ANXIETY    Anxiety    DEPRESSION    Depression    High cholesterol    HYPOTHYROIDISM    Night sweats    Thyroid disease    Weight gain              Past Surgical History:   Procedure Laterality Date    Back surgery            X 2                Social History     Socioeconomic History    Marital status:     Number of children: 2   Tobacco Use    Smoking status: Former     Current packs/day: 0.00     Types: Cigarettes     Quit date:      Years since quittin.3    Smokeless  tobacco: Never    Tobacco comments:     5-10 per week    Vaping Use    Vaping status: Every Day   Substance and Sexual Activity    Alcohol use: Yes     Alcohol/week: 0.0 standard drinks of alcohol     Comment: occasional    Drug use: No   Other Topics Concern     Service No    Blood Transfusions No    Caffeine Concern Yes     Comment: 2 cups per day     Occupational Exposure No    Hobby Hazards No    Sleep Concern No    Stress Concern Yes    Weight Concern Yes     Comment: would like to lose    Special Diet No    Back Care No    Exercise No    Bike Helmet Yes    Seat Belt Yes    Self-Exams Yes     Social Determinants of Health     Physical Activity: Sufficiently Active (12/17/2019)    Received from [facility]    Exercise Vital Sign     Days of Exercise per Week: 2 days     Minutes of Exercise per Session: 90 min              Review of Systems    Positive for stated complaint: here to get PICC line placed, needs for infection in jaw  Other systems are as noted in HPI.  Constitutional and vital signs reviewed.      All other systems reviewed and negative except as noted above.    Physical Exam     ED Triage Vitals [05/17/24 1246]   /89   Pulse 79   Resp 18   Temp 97.8 °F (36.6 °C)   Temp src Temporal   SpO2 100 %   O2 Device None (Room air)       Current:/89   Pulse 79   Temp 97.8 °F (36.6 °C) (Temporal)   Resp 18   Ht 170.2 cm (5' 7\")   Wt 88.5 kg   SpO2 100%   BMI 30.54 kg/m²         Physical Exam    Constitutional: No apparent distress  Eyes: No scleral icterus  Mouth/Throat: Diffuse tenderness overlying the left posterior jaw and postextraction zone  Heart: regular rate rhythm, no murmurs  Lungs: Clear to auscultation bilaterally  Skin: No rash, warm and dry  Neuro: Alert and oriented ×3          ED Course/ My interpretations:     Labs Reviewed   COMP METABOLIC PANEL (14) - Normal   CBC WITH DIFFERENTIAL WITH PLATELET    Narrative:     The following  orders were created for panel order CBC With Differential With Platelet.  Procedure                               Abnormality         Status                     ---------                               -----------         ------                     CBC W/ DIFFERENTIAL[[id number]]                              Final result                 Please view results for these tests on the individual orders.   LACTIC ACID, PLASMA   RAINBOW DRAW LAVENDER   RAINBOW DRAW LIGHT GREEN   BLOOD CULTURE   BLOOD CULTURE   CBC W/ DIFFERENTIAL       All available radiology reports for this visit reviewed.      Medications given:  Orders Placed This Encounter    CBC With Differential With Platelet    Comp Metabolic Panel (14)    Lactic Acid, Plasma    ampicillin-sulbactam (Unasyn) 3 g in sodium chloride 0.9% 100mL IVPB-ADD                      MDM      Diagnosis was considered for the patient including osteomyelitis, abscess, postextraction complication, facial cellulitis, parotitis, sepsis and other pathology.    Patient started on IV antibiotics after cultures obtained.    Details of CT imaging obtained by Contacting the oral surgeons office.  Admission disposition: 5/17/2024  4:47 PM         Patient admitted to hospital for further evaluation and treatment.  Case discussed with Dr. Dotson [facility] over the phone.  He is in agreement with emergency department management and disposition of the patient.        Disposition and Plan     Clinical Impression:  1. Osteomyelitis, jaw acute         Disposition:  Admit  5/17/2024  4:47 pm    Follow-up:  No follow-up provider specified.        Medications Prescribed:  Current Discharge Medication List                            Hospital Problems       Present on Admission  Date Reviewed: 4/20/2024            ICD-10-CM Noted POA    Osteomyelitis (HCC) M86.9 5/17/2024 Unknown           Documentation created with the aid of Dragon voice recognition software.  Although efforts were made to  ensure the accuracy of the note, some inaccuracies may persist.

## 2024-05-17 NOTE — H&P
[facility]     Jimena Goel Patient Status:  Emergency    1960 MRN [id number]   [facility] EMERGENCY DEPARTMENT Attending Khalida Porter MD   Hosp Day # 0 PCP Ila Gifford DO     Chief Complaint: Jaw infection    Subjective:    History of Present Illness:   Jimena Goel is a 64 year old female with PMHx anxiety, depression, hypothyroidism, HTN, DM type II who presents to the hospital after being sent in by her oral surgeon.  Patient had dental extraction to left wisdom teeth a few months ago.  She developed pain and swelling where the previous tooth was extracted from and was seen in the ER and prescribed penicillin.  She then followed up with oral surgery who switched her to Augmentin.  A CT scan was also ordered which showed retained root tips in the left lower mandible with signs of osteomyelitis.  She was then advised to go to an endodontist which she did the same day and plan was for root canal the following day. She also got a call from oral surgeon that she will need jaw surgery the following day due to CT scan results.  However, patient had to delay this as she was overwhelmed and did not have any family at home.  She now presents to the ER.  She was given Unasyn and cultures were taken. Oral surgery consulted.    History/Other:    Past Medical History:  Past Medical History:    ANXIETY    Anxiety    DEPRESSION    Depression    High cholesterol    HYPOTHYROIDISM    Night sweats    Thyroid disease    Weight gain     Past Surgical History:   Past Surgical History:   Procedure Laterality Date    Back surgery            X 2      Family History:   Family History   Problem Relation Age of Onset    Breast Cancer Maternal Grandmother 53    Diabetes Maternal Grandmother     Thyroid disease Mother     Heart Disease Father     Hypertension Father     Heart Attack Father     Hypertension Maternal Grandfather     Heart Attack Maternal Grandfather      Colon Cancer Brother     Heart Attack Paternal Uncle     Genetic Disease Paternal Grandfather      Social History:    reports that she quit smoking about 6 years ago. Her smoking use included cigarettes. She has never used smokeless tobacco. She reports current alcohol use. She reports that she does not use drugs.     Allergies:   Allergies   Allergen Reactions    Augmentin [Amoxicillin-Pot Clavulanate] FACE FLUSHING     Medications:    No current facility-administered medications on file prior to encounter.     Current Outpatient Medications on File Prior to Encounter   Medication Sig Dispense Refill    amoxicillin clavulanate 875-125 MG Oral Tab Take 1 tablet by mouth 2 (two) times daily.      ibuprofen 400 MG Oral Tab Take 1 tablet (400 mg total) by mouth every 4 to 6 hours as needed for Pain.      SEMAGLUTIDE,0.25 OR 0.5MG/DOS, SC Inject 0.125 mg into the skin once a week. Saturdays      chlorhexidine gluconate 0.12 % Mouth/Throat Solution Use as directed 15 mL in the mouth or throat 2 (two) times daily for 7 days. 210 mL 0    hydroCHLOROthiazide 12.5 MG Oral Tab Take 1 tablet (12.5 mg total) by mouth daily. 90 tablet 0    levothyroxine 88 MCG Oral Tab Take 1 tablet (88 mcg total) by mouth before breakfast. 90 tablet 1    metFORMIN HCl 1000 MG Oral Tab Take 1 tablet (1,000 mg total) by mouth 2 (two) times daily with meals. 180 tablet 0    venlafaxine ER 75 MG Oral Capsule SR 24 Hr Take 1 capsule (75 mg total) by mouth every morning. 90 capsule 0    BABY ASPIRIN OR Take 81 mg by mouth daily.      atorvastatin 20 MG Oral Tab Take 1 tablet (20 mg total) by mouth nightly.       Review of Systems:   A comprehensive review of systems was completed.    Pertinent positives and negatives noted in the HPI.    Objective:   Physical Exam:    /63   Pulse 60   Temp 98 °F (36.7 °C)   Resp 18   Ht 5' 7\" (1.702 m)   Wt 195 lb (88.5 kg)   LMP  (LMP Unknown)   SpO2 98%   BMI 30.54 kg/m²   General: No acute  distress, awake and alert  HEENT: Left side of jaw slightly swollen  Respiratory: No rhonchi, no wheezes  Cardiovascular: S1, S2. Regular rate and rhythm  Abdomen: Soft, Non-tender, non-distended, positive bowel sounds  Neuro: WALLACE x 4  Extremities: No edema    Results:    Labs:      Labs Last 24 Hours:  Recent Labs   Lab 05/17/24  1258   RBC 4.94   HGB 14.6   HCT 41.1   MCV 83.2   MCH 29.6   MCHC 35.5   RDW 12.8   NEPRELIM 6.01   WBC 9.5   .0     Recent Labs   Lab 05/17/24  1258   GLU 95   BUN 23   CREATSERUM 0.97   EGFRCR 65   CA 9.4   ALB 3.9      K 3.6      CO2 25.0   ALKPHO 84   AST 26   ALT 25   BILT 0.3   TP 7.8     No results found for: \"PT\", \"INR\"    No results for input(s): \"TROP\", \"TROPHS\", \"CK\" in the last 168 hours.    No results for input(s): \"TROP\", \"PBNP\" in the last 168 hours.    No results for input(s): \"PCT\" in the last 168 hours.    Imaging: Imaging data reviewed in Epic.    Assessment & Plan:      #Recent dental extraction now with retained root tips in the left lower mandible with signs of osteomyelitis  -Blood cultures taken  -IV antibiotics  -Oral surgery on consult  -ID consult  -Pain control  -NPO at midnight    #Hypertension  -Continue home hydrochlorothiazide    #Hyperlipidemia  -Continue home statin    #Prediabetes  -Hold metformin  -Hold of accuchecks and insulin as glucose on labs normal    #Hypothyroidism  -Levothyroxine     #Anxiety  #Depression  -Continue home venlafaxine    Plan of care discussed with patient, RN.    Nato Dotson, DO    Supplementary Documentation:     The 21st Century Cures Act makes medical notes like these available to patients in the interest of transparency. Please be advised this is a medical document. Medical documents are intended to carry relevant information, facts as evident, and the clinical opinion of the practitioner. The medical note is intended as peer to peer communication and may appear blunt or direct. It is written in medical  language and may contain abbreviations or verbiage that are unfamiliar.

## 2024-05-17 NOTE — ED INITIAL ASSESSMENT (HPI)
Pt states she has an infection in her jaw. Pt started augmented for the infection. Pt stated her face turned red and swelled up. Pt states oral surgeon wants her to have a picc line to clear the infection.

## 2024-05-18 ENCOUNTER — APPOINTMENT (OUTPATIENT)
Facility: HOSPITAL | Age: 64
DRG: 159 | End: 2024-05-18
Attending: PHYSICIAN ASSISTANT

## 2024-05-18 PROCEDURE — 05HY33Z INSERTION OF INFUSION DEVICE INTO UPPER VEIN, PERCUTANEOUS APPROACH: ICD-10-PCS | Performed by: STUDENT IN AN ORGANIZED HEALTH CARE EDUCATION/TRAINING PROGRAM

## 2024-05-18 PROCEDURE — 99233 SBSQ HOSP IP/OBS HIGH 50: CPT | Performed by: HOSPITALIST

## 2024-05-18 RX ORDER — ENOXAPARIN SODIUM 100 MG/ML
40 INJECTION SUBCUTANEOUS NIGHTLY
Status: DISCONTINUED | OUTPATIENT
Start: 2024-05-18 | End: 2024-05-20

## 2024-05-18 RX ORDER — METRONIDAZOLE 500 MG/1
500 TABLET ORAL 2 TIMES DAILY
Qty: 84 TABLET | Refills: 0 | Status: SHIPPED | OUTPATIENT
Start: 2024-05-18 | End: 2024-05-20

## 2024-05-18 RX ORDER — METRONIDAZOLE 500 MG/1
500 TABLET ORAL 3 TIMES DAILY
Qty: 126 TABLET | Refills: 0 | Status: SHIPPED | OUTPATIENT
Start: 2024-05-18 | End: 2024-05-18

## 2024-05-18 RX ORDER — KETOROLAC TROMETHAMINE 15 MG/ML
15 INJECTION, SOLUTION INTRAMUSCULAR; INTRAVENOUS EVERY 6 HOURS PRN
Status: ACTIVE | OUTPATIENT
Start: 2024-05-18 | End: 2024-05-20

## 2024-05-18 RX ORDER — CEFTRIAXONE SODIUM 2 G/50ML
2 INJECTION, SOLUTION INTRAVENOUS EVERY 24 HOURS
Qty: 2100 ML | Refills: 0 | Status: SHIPPED | OUTPATIENT
Start: 2024-05-18 | End: 2024-05-20

## 2024-05-18 RX ORDER — SODIUM CHLORIDE 9 MG/ML
INJECTION, SOLUTION INTRAVENOUS CONTINUOUS
Status: DISCONTINUED | OUTPATIENT
Start: 2024-05-18 | End: 2024-05-19

## 2024-05-18 RX ORDER — LIDOCAINE HYDROCHLORIDE 10 MG/ML
5 INJECTION, SOLUTION EPIDURAL; INFILTRATION; INTRACAUDAL; PERINEURAL
Status: DISCONTINUED | OUTPATIENT
Start: 2024-05-18 | End: 2024-05-18 | Stop reason: SDUPTHER

## 2024-05-18 RX ORDER — KETOROLAC TROMETHAMINE 30 MG/ML
30 INJECTION, SOLUTION INTRAMUSCULAR; INTRAVENOUS EVERY 6 HOURS PRN
Status: DISPENSED | OUTPATIENT
Start: 2024-05-18 | End: 2024-05-20

## 2024-05-18 RX ORDER — LIDOCAINE HYDROCHLORIDE 10 MG/ML
5 INJECTION, SOLUTION INFILTRATION; PERINEURAL ONCE
Status: DISCONTINUED | OUTPATIENT
Start: 2024-05-18 | End: 2024-05-20

## 2024-05-18 NOTE — PLAN OF CARE
Resumed care at 0730. Aox4, vitals stable. IV abx given as ordered. Ambulating to bathroom, hallways. Advanced to general diet, no surgical intervention per oral surgery. PICC line placed, flushed/blood return. [facility], report given to Puja ENCARNACION. Patient notified of transfer, transferred with all belongings.

## 2024-05-18 NOTE — PLAN OF CARE
Received patient awake and alert x4. Denies any pain. On IV abx. IV fluids started. NPO. Up walking to the bathroom on steady gait.     Problem: Patient/Family Goals  Goal: Patient/Family Long Term Goal  Description: Patient's Long Term Goal: DC home    Interventions:  - monitor VS, labs, intake and output  - IV fluids  - IV abx.  - administer pain meds prn  - See additional Care Plan goals for specific interventions  Outcome: Progressing  Goal: Patient/Family Short Term Goal  Description: Patient's Short Term Goal: VS stable    Interventions:   - - monitor VS, labs, intake and output  - IV fluids  - IV abx.  - administer pain meds prn    - See additional Care Plan goals for specific interventions  Outcome: Progressing     Problem: PAIN - ADULT  Goal: Verbalizes/displays adequate comfort level or patient's stated pain goal  Description: INTERVENTIONS:  - Encourage pt to monitor pain and request assistance  - Assess pain using appropriate pain scale  - Administer analgesics based on type and severity of pain and evaluate response  - Implement non-pharmacological measures as appropriate and evaluate response  - Consider cultural and social influences on pain and pain management  - Manage/alleviate anxiety  - Utilize distraction and/or relaxation techniques  - Monitor for opioid side effects  - Notify MD/LIP if interventions unsuccessful or patient reports new pain  - Anticipate increased pain with activity and pre-medicate as appropriate  Outcome: Progressing

## 2024-05-18 NOTE — PLAN OF CARE
Assumed care of patient upon transfer to unit. Pt Aox4, denies pain. PICC line to APRIL, pt notes slight discomfort to PICC insertion site, no redness swelling or drainage noted. Up ad heidi. Tolerating diet. Plan to Dc once ins auth completed for home IV abx.

## 2024-05-18 NOTE — CONSULTS
[facility]   part of [facility] ID CONSULT NOTE    Jimena Goel Patient Status:  Inpatient    1960 MRN [id number]   Location [facility] 0SW-A Attending Greg Hunt MD   Hosp Day # 1 PCP Ila Gifford DO       Reason for Consultation:  Osteomyelitis of L mandible    History of Present Illness:  Jimena Goel is a 64 year old female with a history of hypothyroidism. Patient presents for further treatment of her left jaw/mouth and for PICC placement.     Patient states that she initially had 2 teeth extracted 3/2024. She reports that she then had a dental cleaning . She noticed pain and swelling to her L jaw about 6 hours later. Denies any erythema, fevers or chills. She was seen in the ED . She was dc with PCN and chlorhexidine mouth wash. She was advised to see oral surgery.     She went to see oral surgery  and had a CT that showed retained root tips and signs of OM. She was changed to po augmentin at that time. There was also concern for compromise of an additional lower left tooth. She was advised to see an endodontist for a root canal of that tooth which she had done . She reports she was supposed to have surgery Wednesday with the oral surgeon but was unable to coordinate with her family. She states she was advised by oral surgery to go to ED to get PICC line for IV abx.     Patient does endorse some facial flushing with Augmentin but states she has been tolerating Unasyn without any difficulty. Afebrile. WBC wnl. Her jaw swelling has improved from last week. Pain also much better.     History:  Past Medical History:    ANXIETY    Anxiety    DEPRESSION    Depression    High cholesterol    HYPOTHYROIDISM    Night sweats    Thyroid disease    Weight gain     Past Surgical History:   Procedure Laterality Date    Back surgery            X 2     Family History   Problem Relation Age of Onset    Breast Cancer Maternal Grandmother 53    Diabetes Maternal  Grandmother     Thyroid disease Mother     Heart Disease Father     Hypertension Father     Heart Attack Father     Hypertension Maternal Grandfather     Heart Attack Maternal Grandfather     Colon Cancer Brother     Heart Attack Paternal Uncle     Genetic Disease Paternal Grandfather       reports that she quit smoking about 6 years ago. Her smoking use included cigarettes. She has never used smokeless tobacco. She reports current alcohol use. She reports that she does not use drugs.    Allergies:  Allergies   Allergen Reactions    Augmentin [Amoxicillin-Pot Clavulanate] FACE FLUSHING       Medications:    Current Facility-Administered Medications:     ampicillin-sulbactam (Unasyn) 3 g in sodium chloride 0.9% 100mL IVPB-ADD, 3 g, Intravenous, Q6H    acetaminophen (Tylenol Extra Strength) tab 500 mg, 500 mg, Oral, Q6H PRN    HYDROcodone-acetaminophen (Norco) 5-325 MG per tab 1 tablet, 1 tablet, Oral, Q6H PRN    morphINE PF 4 MG/ML injection 0.52 mg, 0.52 mg, Intravenous, Q2H PRN **OR** morphINE PF 4 MG/ML injection 1 mg, 1 mg, Intravenous, Q2H PRN **OR** morphINE PF 4 MG/ML injection 2 mg, 2 mg, Intravenous, Q2H PRN    sodium chloride 0.9% infusion, , Intravenous, Continuous    atorvastatin (Lipitor) tab 20 mg, 20 mg, Oral, Nightly    hydroCHLOROthiazide tab 12.5 mg, 12.5 mg, Oral, Daily    levothyroxine (Synthroid) tab 88 mcg, 88 mcg, Oral, Daily @ 0700    venlafaxine ER (Effexor-XR) 24 hr cap 75 mg, 75 mg, Oral, QAM    Review of Systems:  CONSTITUTIONAL:  No weakness or fatigue.  HEENT: + L jaw pain  SKIN:  No rash or itching.  CARDIOVASCULAR:  No chest pain  RESPIRATORY:  No shortness of breath, cough   GASTROINTESTINAL:  No nausea, vomiting or diarrhea. No abdominal pain  GENITOURINARY:  No Burning on urination.   NEUROLOGICAL:  No headache  MUSCULOSKELETAL:  No muscle pain  HEMATOLOGIC:  No bleeding.  ALLERGIES:  No history of asthma    Physical Exam:  Vital signs: Blood pressure 100/64, pulse (!) 49,  temperature 98.3 °F (36.8 °C), temperature source Oral, resp. rate 20, height 170.2 cm (5' 7\"), weight 195 lb (88.5 kg), SpO2 99%, not currently breastfeeding.    General: Alert, oriented, NAD, on room air.   HEENT: Moist mucous membranes.   Neck: Supple. Some L jaw swelling, no erythema noted.  Cardiovascular: RRR  Respiratory: Clear to auscultation bilaterally.  No wheezes. No rhonchi.  Abdomen: Soft, nontender, nondistended.   Musculoskeletal: Movement of all extremities. No edema noted  Integument: No lesions. No erythema.    Laboratory Data:  Recent Labs   Lab 05/17/24  1258   RBC 4.94   HGB 14.6   HCT 41.1   MCV 83.2   MCH 29.6   MCHC 35.5   RDW 12.8   NEPRELIM 6.01   WBC 9.5   .0     Recent Labs   Lab 05/17/24  1258   GLU 95   BUN 23   CREATSERUM 0.97   CA 9.4   ALB 3.9      K 3.6      CO2 25.0   ALKPHO 84   AST 26   ALT 25   BILT 0.3   TP 7.8       Microbiology: Reviewed in EMR    Radiology: No new imaging noted    ASSESSMENT:  Osteomyelitis to left mandible  S/p dental extraction of 2 teeth 3/2024. Outpatient CT with retained root tips and and c/w osteomyelitis per chart review.  Has been on PCN, followed by Augmentin outpatient x 1 week  Hypothyroidism     PLAN:  - continue IV Unasyn  - oral surgery on consult--> per RN, plans for outpatient procedure. Recommend sending culture and bone bx if possible to help guide treatment.  - obtain copy of CT report if able  - follow temps and wbc  - follow blood cultures  - will place PICC and plan for 6 weeks of IV abx with CTX and po flagyl. Will need a dose of IV CTX prior to dc on the day of dc.  - reviewed labs, micro, imaging reports, available old records    Discussed case with RN, patient, Dr. Hunt, HANK and Dr. Pearson.    Thank you for allowing us to participate in the care of this patient. Please do not hesitate to call if you have any questions.   We will continue to follow with you and will make further recommendations based on her  progress.    GLENIS [facility]  [phone]  5/18/2024

## 2024-05-18 NOTE — CM/SW NOTE
Informed by [facility] ID that patient will need OP IV abx. Sent infusion referrals in aidin. Attempted to contact [facility] however no answer, will try again.     SW/CM to remain available for support and/or discharge planning.    Addendum 12:15pm: Received response from [employer]. They states that [employer] policy is active, but office is closed, unable to obtain benefit info until Monday. Therefore, they are unable to check coverage of ordered IV abx and any potential costs.        ROXANA Ye  Discharge Planner  [phone]

## 2024-05-18 NOTE — PROGRESS NOTES
[facility]   part of [facility]     Hospitalist Progress Note     Jimena Goel Patient Status:  Inpatient    1960 MRN [id number]   Location [facility] 0SW-A Attending Greg Hunt MD   Hosp Day # 1 PCP Ila Gifford DO     Chief Complaint: OM    Subjective:   Patient denies pain at present.     Current medications:   ampicillin-sulbactam  3 g Intravenous Q6H    atorvastatin  20 mg Oral Nightly    levothyroxine  88 mcg Oral Daily @ 0700    venlafaxine ER  75 mg Oral QAM       Objective:    Review of Systems:   10 point ROS completed and was negative, except for pertinent positive and negatives stated in subjective.    Vital signs:  Temp:  [97.8 °F (36.6 °C)-98.3 °F (36.8 °C)] 98.3 °F (36.8 °C)  Pulse:  [49-79] 49  Resp:  [18-20] 20  BP: (100-154)/(63-89) 100/64  SpO2:  [98 %-100 %] 99 %  Patient Weight for the past 72 hrs:   Weight   24 1246 195 lb (88.5 kg)   24 1812 195 lb (88.5 kg)     Physical Exam:    General: No acute distress.   Neuro: AAOx3    Diagnostic Data:    Labs:  Recent Labs   Lab 24  1258   WBC 9.5   HGB 14.6   MCV 83.2   .0       Recent Labs   Lab 24  1258   GLU 95   BUN 23   CREATSERUM 0.97   CA 9.4   ALB 3.9      K 3.6      CO2 25.0   ALKPHO 84   AST 26   ALT 25   BILT 0.3   TP 7.8       Estimated Creatinine Clearance: 57 mL/min (based on SCr of 0.97 mg/dL).    No results for input(s): \"PTP\", \"INR\" in the last 168 hours.         COVID-19 Lab Results    COVID-19  No results found for: \"COVID19\"    Pro-Calcitonin  No results for input(s): \"PCT\" in the last 168 hours.    Cardiac  No results for input(s): \"TROP\", \"PBNP\" in the last 168 hours.    Creatinine Kinase  No results for input(s): \"CK\" in the last 168 hours.    Inflammatory Markers  No results for input(s): \"CRP\", \"SHANNA\", \"LDH\", \"DDIMER\" in the last 168 hours.    No results for input(s): \"TROP\", \"TROPHS\", \"CK\" in the last 168 hours.    Imaging: Imaging data reviewed in  Epic.    Medications:    ampicillin-sulbactam  3 g Intravenous Q6H    atorvastatin  20 mg Oral Nightly    levothyroxine  88 mcg Oral Daily @ 0700    venlafaxine ER  75 mg Oral QAM       Assessment & Plan:    Left mandibular osteomyelitis  Prediabetes  Hypothyroidism on Synthroid   Essential hypertension  Dyslipidemia on Lipitor   Anxiety  Depression    Plan:  IVF  IV abx  Pain control  PICC  ID & Oral surgery consult   SW consult for outpatient abx  Hold hydrochlorothiazide  Monitor hemodynamics    Supplementary Documentation:   Quality:  DVT Prophylaxis: Lovenox    At this point Ms. Goel is expected to be discharge to: Home    Plan of care discussed with patient, RN and ID PA.    Greg Hunt MD

## 2024-05-19 PROCEDURE — 99231 SBSQ HOSP IP/OBS SF/LOW 25: CPT | Performed by: HOSPITALIST

## 2024-05-19 NOTE — PROGRESS NOTES
[facility]   part of [facility]     Hospitalist Progress Note     Jimena Goel Patient Status:  Inpatient    1960 MRN [id number]   Location [facility] 0SW-A Attending Greg Hunt MD   Hosp Day # 2 PCP Ila Gifford DO     Chief Complaint: OM    Subjective:   Patient denies new complaints.     Current medications:   enoxaparin  40 mg Subcutaneous Nightly    lidocaine  5 mL Intradermal Once    ampicillin-sulbactam  3 g Intravenous Q6H    atorvastatin  20 mg Oral Nightly    levothyroxine  88 mcg Oral Daily @ 0700    venlafaxine ER  75 mg Oral QAM       Objective:    Review of Systems:   10 point ROS completed and was negative, except for pertinent positive and negatives stated in subjective.    Vital signs:  Temp:  [97.7 °F (36.5 °C)-98.2 °F (36.8 °C)] 97.8 °F (36.6 °C)  Pulse:  [57-72] 62  Resp:  [18-20] 19  BP: (107-160)/(60-84) 107/60  SpO2:  [97 %-98 %] 97 %  Patient Weight for the past 72 hrs:   Weight   24 1246 195 lb (88.5 kg)   24 1812 195 lb (88.5 kg)     Physical Exam:    General: No acute distress.   Neuro: AAOx3    Diagnostic Data:    Labs:  Recent Labs   Lab 24  1258   WBC 9.5   HGB 14.6   MCV 83.2   .0       Recent Labs   Lab 24  1258   GLU 95   BUN 23   CREATSERUM 0.97   CA 9.4   ALB 3.9      K 3.6      CO2 25.0   ALKPHO 84   AST 26   ALT 25   BILT 0.3   TP 7.8       Estimated Creatinine Clearance: 57 mL/min (based on SCr of 0.97 mg/dL).    No results for input(s): \"PTP\", \"INR\" in the last 168 hours.         COVID-19 Lab Results    COVID-19  No results found for: \"COVID19\"    Pro-Calcitonin  No results for input(s): \"PCT\" in the last 168 hours.    Cardiac  No results for input(s): \"TROP\", \"PBNP\" in the last 168 hours.    Creatinine Kinase  No results for input(s): \"CK\" in the last 168 hours.    Inflammatory Markers  No results for input(s): \"CRP\", \"SHANNA\", \"LDH\", \"DDIMER\" in the last 168 hours.    No results for input(s): \"TROP\",  \"TROPHS\", \"CK\" in the last 168 hours.    Imaging: Imaging data reviewed in Epic.    Medications:    enoxaparin  40 mg Subcutaneous Nightly    lidocaine  5 mL Intradermal Once    ampicillin-sulbactam  3 g Intravenous Q6H    atorvastatin  20 mg Oral Nightly    levothyroxine  88 mcg Oral Daily @ 0700    venlafaxine ER  75 mg Oral QAM       Assessment & Plan:    Left mandibular osteomyelitis  Prediabetes  Hypothyroidism on Synthroid   Essential hypertension  Dyslipidemia on Lipitor   Anxiety  Depression    Plan:  Stop IVF  IV abx  Pain control  PICC placed 5/18  ID & Oral surgery consult   SW consult for outpatient abx  Hold hydrochlorothiazide  Monitor hemodynamics    Supplementary Documentation:   Quality:  DVT Prophylaxis: Lovenox    At this point Ms. Goel is expected to be discharge to: Home    Plan of care discussed with patient and RN.    Greg Hunt MD

## 2024-05-19 NOTE — PLAN OF CARE
A&oriented x4 . VSS. . IS encouraged.  Ankle pumps encouraged. Tolerating diet.  Voiding. Denies pain. Up ad heidi. Await ins auth of IV abx and Dc likely tomorrow. Patient updated and in agreement with plan of care. Safety precautions in place. Instructed patient to call for assistance, call light within reach.

## 2024-05-20 VITALS
RESPIRATION RATE: 16 BRPM | HEIGHT: 67 IN | BODY MASS INDEX: 30.61 KG/M2 | SYSTOLIC BLOOD PRESSURE: 122 MMHG | HEART RATE: 59 BPM | OXYGEN SATURATION: 95 % | WEIGHT: 195 LBS | TEMPERATURE: 98 F | DIASTOLIC BLOOD PRESSURE: 67 MMHG

## 2024-05-20 PROCEDURE — 99238 HOSP IP/OBS DSCHRG MGMT 30/<: CPT | Performed by: HOSPITALIST

## 2024-05-20 RX ORDER — TRAZODONE HYDROCHLORIDE 50 MG/1
50 TABLET ORAL NIGHTLY PRN
Status: DISCONTINUED | OUTPATIENT
Start: 2024-05-20 | End: 2024-05-20

## 2024-05-20 RX ORDER — ERTAPENEM 1 G/1
1 INJECTION, POWDER, LYOPHILIZED, FOR SOLUTION INTRAMUSCULAR; INTRAVENOUS DAILY
Qty: 42 EACH | Refills: 0 | Status: SHIPPED | OUTPATIENT
Start: 2024-05-20 | End: 2024-07-01

## 2024-05-20 NOTE — PLAN OF CARE
Patient A & O x4. VSS, on RA. Denies pain at this time. PICC to LUE. IV abx. Voiding freely. Up ad heidi. Safety measures in place. Call light within reach.

## 2024-05-20 NOTE — PLAN OF CARE
Alert and oriented x 4. Vitals stable on room air. Voiding without difficulty. Last BM 05/18. Tolerating regular diet. Tolerating IV antibiotic. PICC line to the RUE. SCD's on bilaterally. Safety precautions in place. Plan for discharge with IV and PO antibiotic. Plan of care discussed with patient.

## 2024-05-20 NOTE — PROGRESS NOTES
[facility]   part of [facility] ID PROGRESS NOTE    Jimena Goel Patient Status:  Inpatient    1960 MRN [id number]   Location [facility] 0SW-A Attending Greg Hunt MD   Hosp Day # 3 PCP Ila Gifford DO     Abx: IV Unasyn     Subjective: Patient seen and examined today. Denies any pain to her jaw/teeth. Denies any drainage. Afebrile. On room air.     Allergies:  Allergies   Allergen Reactions    Augmentin [Amoxicillin-Pot Clavulanate] FACE FLUSHING       Medications:    Current Facility-Administered Medications:     traZODone (Desyrel) tab 50 mg, 50 mg, Oral, Nightly PRN    enoxaparin (Lovenox) 40 MG/0.4ML SUBQ injection 40 mg, 40 mg, Subcutaneous, Nightly    lidocaine (Xylocaine) 1 % injection, 5 mL, Intradermal, Once    ampicillin-sulbactam (Unasyn) 3 g in sodium chloride 0.9% 100mL IVPB-ADD, 3 g, Intravenous, Q6H    acetaminophen (Tylenol Extra Strength) tab 500 mg, 500 mg, Oral, Q6H PRN    HYDROcodone-acetaminophen (Norco) 5-325 MG per tab 1 tablet, 1 tablet, Oral, Q6H PRN    morphINE PF 4 MG/ML injection 0.52 mg, 0.52 mg, Intravenous, Q2H PRN **OR** morphINE PF 4 MG/ML injection 1 mg, 1 mg, Intravenous, Q2H PRN **OR** morphINE PF 4 MG/ML injection 2 mg, 2 mg, Intravenous, Q2H PRN    atorvastatin (Lipitor) tab 20 mg, 20 mg, Oral, Nightly    levothyroxine (Synthroid) tab 88 mcg, 88 mcg, Oral, Daily @ 0700    venlafaxine ER (Effexor-XR) 24 hr cap 75 mg, 75 mg, Oral, QAM    Review of Systems:  Completed. See pertinent positives and negatives above.    Physical Exam:  Vital signs: Blood pressure 122/67, pulse 59, temperature 97.5 °F (36.4 °C), temperature source Oral, resp. rate 16, height 170.2 cm (5' 7\"), weight 195 lb (88.5 kg), SpO2 95%, not currently breastfeeding.    General: Alert, oriented, NAD, on room air.   HEENT: Moist mucous membranes.   Neck: Supple. Minimal L jaw swelling, no erythema noted.  Cardiovascular: RRR  Respiratory: Clear to auscultation bilaterally.   No wheezes. No rhonchi.  Abdomen: Soft, nontender, nondistended.   Musculoskeletal: Movement of all extremities. No edema noted  Integument: No lesions. No erythema.    Laboratory Data:  Recent Labs   Lab 05/17/24  1258   RBC 4.94   HGB 14.6   HCT 41.1   MCV 83.2   MCH 29.6   MCHC 35.5   RDW 12.8   NEPRELIM 6.01   WBC 9.5   .0     Recent Labs   Lab 05/17/24  1258   GLU 95   BUN 23   CREATSERUM 0.97   CA 9.4   ALB 3.9      K 3.6      CO2 25.0   ALKPHO 84   AST 26   ALT 25   BILT 0.3   TP 7.8       Microbiology: Reviewed in EMR    Radiology: No new imaging noted    ASSESSMENT:  Osteomyelitis to left mandible  S/p dental extraction of 2 teeth 3/2024. Outpatient CT with retained root tips and and c/w osteomyelitis per chart review.  Has been on PCN, followed by Augmentin outpatient x 1 week  Hypothyroidism     PLAN:  - continue IV Unasyn  - oral surgery on consult--> per RN 5/18, plans for outpatient procedure. Recommend sending culture and bone bx if possible to help guide treatment.  - obtain copy of CT report if able  - follow temps and wbc  - follow blood cultures- ngtd  - PICC placed to RUE 5/18 and plan for 6 weeks of IV abx with lnvanz. Will need a dose prior to dc on the day of dc. Ok for dc planning from ID standpoint, once IV abx are arranged.    Discussed case with RN, patient, SW and Dr. Walton.    GLENIS Lund   [facility]  [phone]    ID ATTENDING ADDENDUM     Pt seen an examined independently. Chart reviewed. Agree with above. Note has been reviewed by me and modified as needed.  Exam and Impression/ Recs as noted above.  Will follow up with me in 2-3 weeks. Will also f/u with Dr Segovia  D/w staff and with pt  More than 50% of clinical time and 100% of the clinical decision making performed by me.    Gladis Walton MD

## 2024-05-20 NOTE — PROGRESS NOTES
AVS reviewed, will dc after Invanz dose given ( Med changed) , next dose will be tomorrow , will be done at teach & train class at  [facility],@12pm, Picc line in place-flushed after Invanz completed, family to  shortly .

## 2024-05-20 NOTE — DISCHARGE SUMMARY
[place] HOSPITALIST  DISCHARGE SUMMARY     Jimena Goel Patient Status:  Inpatient    1960 MRN [id number]   Location [facility] 3SW-A Attending Lalit Rivera MD   Hosp Day # 3 PCP Ila Gifford DO     Date of Admission: 2024  Date of Discharge: 2024  Discharge Disposition: home  Chief complaint:   Chief Complaint   Patient presents with    Other     Needs picc line         Discharge Diagnoses and Brief Synopsis:  Left mandibular osteomyelitis; treated with IV abx, seen by ID during stay.  Will need oral surgery OP followup. No complications.  Prediabetes  Hypothyroidism on Synthroid   Essential hypertension  Dyslipidemia on Lipitor   Anxiety  Depression        Lab/Test results pending at Discharge:   cultures        Admission History of Present Illness (author of HPI not necessarily myself; see H&P author): Jimena Goel is a 64 year old female with PMHx anxiety, depression, hypothyroidism, HTN, DM type II who presents to the hospital after being sent in by her oral surgeon.  Patient had dental extraction to left wisdom teeth a few months ago.  She developed pain and swelling where the previous tooth was extracted from and was seen in the ER and prescribed penicillin.  She then followed up with oral surgery who switched her to Augmentin.  A CT scan was also ordered which showed retained root tips in the left lower mandible with signs of osteomyelitis.  She was then advised to go to an endodontist which she did the same day and plan was for root canal the following day. She also got a call from oral surgeon that she will need jaw surgery the following day due to CT scan results.  However, patient had to delay this as she was overwhelmed and did not have any family at home.  She now presents to the ER.  She was given Unasyn and cultures were taken. Oral surgery consulted.       Lace+ Score: 38  59-90 High Risk  29-58 Medium Risk  0-28   Low Risk  Patient was referred to the [place]  [facility].    TCM Follow-Up Recommendation:  LACE 29-58: Moderate Risk of readmission after discharge from the hospital.      Discharge Medication List:     Discharge Medications        START taking these medications        Instructions Prescription details   ertapenem 1 g Solr  Commonly known as: Invanz      Inject 1 g into the vein daily. Will need weekly labs with CBC, BMP, ESR and CRP while on this medication.   Stop taking on: July 1, 2024  Quantity: 42 each  Refills: 0            CONTINUE taking these medications        Instructions Prescription details   atorvastatin 20 MG Tabs  Commonly known as: Lipitor      Take 1 tablet (20 mg total) by mouth nightly.   Refills: 0     BABY ASPIRIN OR      Take 81 mg by mouth daily.   Refills: 0     hydroCHLOROthiazide 12.5 MG Tabs      Take 1 tablet (12.5 mg total) by mouth daily.   Quantity: 90 tablet  Refills: 0     levothyroxine 88 MCG Tabs  Commonly known as: Synthroid      Take 1 tablet (88 mcg total) by mouth before breakfast.   Quantity: 90 tablet  Refills: 1     metFORMIN HCl 1000 MG Tabs  Commonly known as: GLUCOPHAGE      Take 1 tablet (1,000 mg total) by mouth 2 (two) times daily with meals.   Quantity: 180 tablet  Refills: 0     SEMAGLUTIDE(0.25 OR 0.5MG/DOS) SC      Inject 0.125 mg into the skin once a week. Saturdays   Refills: 0     venlafaxine ER 75 MG Cp24  Commonly known as: Effexor-XR      Take 1 capsule (75 mg total) by mouth every morning.   Quantity: 90 capsule  Refills: 0            STOP taking these medications      amoxicillin clavulanate 875-125 MG Tabs  Commonly known as: Augmentin        chlorhexidine gluconate 0.12 % Soln  Commonly known as: Peridex        ibuprofen 400 MG Tabs  Commonly known as: Motrin                  Where to Get Your Medications        Please  your prescriptions at the location directed by your doctor or nurse    Bring a paper prescription for each of these medications  ertapenem 1 g Solr          [facility] reviewed: yes    Follow-up appointment:   Juan Miguel Segovia, LYUBOV  [address] [facility] [address]  [phone]    Follow up  call for follow up appointment unless you already have an oral surgeon to follow up with    [facility]  [address] [facility] [address]  [facility] [id number] [phone]  Follow up in 3 day(s)      Gladis Walton MD  [address] [facility] [address]  [facility] [address]  [phone]    Schedule an appointment as soon as possible for a visit in 2 week(s)  Please call to make an appointment to be seen in 2 weeks.    Appointments for Next 30 Days 5/21/2024 - 6/20/2024        Date Arrival Time Visit Type Length Department Provider     5/24/2024  1:00 PM  NON[facility] HOSPITAL FOLLOW UP [5060] 60 min [facility] Connie Yoon APRN    Patient Instructions:         Location Instructions:     Masks are optional for all patients and visitors, unless otherwise indicated.               6/7/2024 11:00 AM  FOLLOW UP VISIT [5578] 30 min [facility], [address] [place] [address] Ila Gifford DO    Patient Instructions:         Location Instructions:     Masks are optional for all patients and visitors, unless otherwise indicated.               6/17/2024 11:40 AM  MYCHART FOLLOW UP SPECIALTY [0619] 20 min [facility], [address] [place] [address] Chrissie Vega APRN    Patient Instructions:     Contact your primary care provider if your insurance requires a referral.    Please arrive 15 minutes prior to your scheduled appointment. Be sure to bring your current Insurance card, Photo ID, and medication bottles or a list of your current medications.      A 24 hour notice is required to cancel any appointment or you may be charged a $40 No Show Fee.     Important: 24 hour notice is required to cancel any appointment or you may be charged a $40 No Show Fee. Please notify your physician office.         Location Instructions:     Masks are  optional for all patients and visitors, unless otherwise indicated.                      Vital signs:  Temp:  [97.5 °F (36.4 °C)] 97.5 °F (36.4 °C)  Pulse:  [59] 59  Resp:  [16] 16  BP: (122)/(67) 122/67  SpO2:  [95 %] 95 %    Physical Exam:    General: No acute distress.   Respiratory:no increased work of breathing  Neurologic: No focal neurological deficits.   Musculoskeletal: Moves all extremities.  Extremities: No edema.  -----------------------------------------------------------------------------------------------  PATIENT DISCHARGE INSTRUCTIONS: See electronic chart    Lalit Garcia MD    Time spent:   32 minutes

## 2024-05-20 NOTE — PROGRESS NOTES
Patient seen and examined.  Discharge if ok with consultants and if outpatient abx arranged.  Hospitalist portion of med rec completed.    Lalit Rivera MD  [facility]  Internal Medicine Hospitalist

## 2024-05-20 NOTE — PROGRESS NOTES
[address] [address]  ?  05/20/24  ?  Re: Jimena Goel  ?  To Whom It May Concern:    Jimena Goel was admitted to [facility] from 5/17/2024 to 05/20/24.    Please excuse Jimena Goel from attending work for these days.      Patient will follow up with their primary care physician upon discharge.   Further restrictions and work excuse will be based on primary care physician's evaluation.  ?  Thank you,    Lalit Garcia MD, MD  [facility]

## 2024-05-20 NOTE — CM/SW NOTE
05/20/24 1100   Discharge disposition   Expected discharge disposition Home or Self   DME/Infusion Providers [facility]   Discharge transportation Private car     Met with patient at the bedside to discuss discharge planning.     Final IV abx order uploaded in [facility] and [facility] is chosen provider.    Discussed w/pt and she will go to their office for teach and train and weekly labs/line care.    Discussed w/Eboni from [facility] and they will call pt today to set up appointment for tomorrow.    Addendum 1535  Notified by Felicia GALVIN that they are changing med to Invanz.  Confirmed w/[facility] that they can do Invanz and see pt tomorrow.  Pt will need first dose today prior to discharge.  Discussed w/RN    PHQ4- order received.  Pt reports she has no needs- felt anxious due to her medical situation    / to remain available for support and/or discharge planning.     Savannah Cote MBA MSN, RN CTL/  [id number]

## 2024-05-21 ENCOUNTER — PATIENT OUTREACH (OUTPATIENT)
Dept: CASE MANAGEMENT | Age: 64
End: 2024-05-21

## 2024-05-21 DIAGNOSIS — Z02.9 ENCOUNTERS FOR UNSPECIFIED ADMINISTRATIVE PURPOSE: Primary | ICD-10-CM

## 2024-05-21 NOTE — PROGRESS NOTES
LM for pt to call [facility] for TCM since discharge. NCM phone number was provided for pt to call back.    [facility] contact information was provided for pt to call back as well.

## 2024-05-21 NOTE — PROGRESS NOTES
Left message on mailbox for pt to call NCM back for TCM.  [facility] contact information [phone] and [facility] # [phone] included in message.      Future Appointments   Date Time Provider Department Center   5/24/2024  1:00 PM Connie Yoon APRN [facility]   6/7/2024 11:00 AM Ila Gifford DO [facility] [address] [facility] [address]   6/17/2024 11:40 AM Chrissie Vega APRN [facility] [address]

## 2024-05-23 NOTE — PAYOR COMM NOTE
--------------  DISCHARGE REVIEW    Payor: BLUE CROSS [facility] PPO  Subscriber #:  [id number]  Authorization Number: [id number]    Admit date: 24  Admit time:   5:48 PM  Discharge Date: 2024  6:25 PM     Admitting Physician: Nato Dotson DO  Attending Physician:  No att. providers found  Primary Care Physician: Ila Gifford DO          Discharge Summary Notes        Discharge Summary signed by Lalit Rivera MD at 2024  6:21 AM       Author: Lalit Rivera MD Specialty: HOSPITALIST, Internal Medicine Author Type: Physician    Filed: 2024  6:21 AM Date of Service: 2024 11:32 AM Status: Signed    : Lalit Rivera MD (Physician)           [facility]  DISCHARGE SUMMARY     Jimena Goel Patient Status:  Inpatient    1960 MRN [id number]   Location [facility] 3SW-A Attending Lalit Rivera MD   Hosp Day # 3 PCP Ila Gifford DO     Date of Admission: 2024  Date of Discharge: 2024  Discharge Disposition: home  Chief complaint:   Chief Complaint   Patient presents with    Other     Needs picc line         Discharge Diagnoses and Brief Synopsis:  Left mandibular osteomyelitis; treated with IV abx, seen by ID during stay.  Will need oral surgery OP followup. No complications.  Prediabetes  Hypothyroidism on Synthroid   Essential hypertension  Dyslipidemia on Lipitor   Anxiety  Depression        Lab/Test results pending at Discharge:   cultures        Admission History of Present Illness (author of HPI not necessarily myself; see H&P author): Jimena Goel is a 64 year old female with PMHx anxiety, depression, hypothyroidism, HTN, DM type II who presents to the hospital after being sent in by her oral surgeon.  Patient had dental extraction to left wisdom teeth a few months ago.  She developed pain and swelling where the previous tooth was extracted from and was seen in the ER and prescribed penicillin.  She then followed up with oral surgery who switched her  to Augmentin.  A CT scan was also ordered which showed retained root tips in the left lower mandible with signs of osteomyelitis.  She was then advised to go to an endodontist which she did the same day and plan was for root canal the following day. She also got a call from oral surgeon that she will need jaw surgery the following day due to CT scan results.  However, patient had to delay this as she was overwhelmed and did not have any family at home.  She now presents to the ER.  She was given Unasyn and cultures were taken. Oral surgery consulted.       Lace+ Score: 38  59-90 High Risk  29-58 Medium Risk  0-28   Low Risk  Patient was referred to the [facility].    TCM Follow-Up Recommendation:  LACE 29-58: Moderate Risk of readmission after discharge from the hospital.      Discharge Medication List:     Discharge Medications        START taking these medications        Instructions Prescription details   ertapenem 1 g Solr  Commonly known as: Invanz      Inject 1 g into the vein daily. Will need weekly labs with CBC, BMP, ESR and CRP while on this medication.   Stop taking on: July 1, 2024  Quantity: 42 each  Refills: 0            CONTINUE taking these medications        Instructions Prescription details   atorvastatin 20 MG Tabs  Commonly known as: Lipitor      Take 1 tablet (20 mg total) by mouth nightly.   Refills: 0     BABY ASPIRIN OR      Take 81 mg by mouth daily.   Refills: 0     hydroCHLOROthiazide 12.5 MG Tabs      Take 1 tablet (12.5 mg total) by mouth daily.   Quantity: 90 tablet  Refills: 0     levothyroxine 88 MCG Tabs  Commonly known as: Synthroid      Take 1 tablet (88 mcg total) by mouth before breakfast.   Quantity: 90 tablet  Refills: 1     metFORMIN HCl 1000 MG Tabs  Commonly known as: GLUCOPHAGE      Take 1 tablet (1,000 mg total) by mouth 2 (two) times daily with meals.   Quantity: 180 tablet  Refills: 0     SEMAGLUTIDE(0.25 OR 0.5MG/DOS) SC      Inject 0.125 mg into  the skin once a week. Saturdays   Refills: 0     venlafaxine ER 75 MG Cp24  Commonly known as: Effexor-XR      Take 1 capsule (75 mg total) by mouth every morning.   Quantity: 90 capsule  Refills: 0            STOP taking these medications      amoxicillin clavulanate 875-125 MG Tabs  Commonly known as: Augmentin        chlorhexidine gluconate 0.12 % Soln  Commonly known as: Peridex        ibuprofen 400 MG Tabs  Commonly known as: Motrin                  Where to Get Your Medications        Please  your prescriptions at the location directed by your doctor or nurse    Bring a paper prescription for each of these medications  ertapenem 1 g Solr         [facility]    Follow-up appointment:   Juan Miguel Segovia, LYUBOV  [address] [facility] [address]  [phone]    Follow up  call for follow up appointment unless you already have an oral surgeon to follow up with    [facility]  [address] [facility] [address]  [facility] [id number] [phone]  Follow up in 3 day(s)      Gladis aWlton MD  [address] [place] [address]  [facility] [address]  [phone]    Schedule an appointment as soon as possible for a visit in 2 week(s)  Please call to make an appointment to be seen in 2 weeks.    Appointments for Next 30 Days 5/21/2024 - 6/20/2024        Date Arrival Time Visit Type Length Department Provider     5/24/2024  1:00 PM  NON-[facility] HOSPITAL FOLLOW UP [5060] 60 min [facility] Connie Yoon APRN    Patient Instructions:         Location Instructions:     Masks are optional for all patients and visitors, unless otherwise indicated.               6/7/2024 11:00 AM  FOLLOW UP VISIT [9128] 30 min [facility], [address] [place] [address] Ila Gifford DO    Patient Instructions:         Location Instructions:     Masks are optional for all patients and visitors, unless otherwise indicated.               6/17/2024 11:40 AM  MYCHART FOLLOW UP SPECIALTY [7327] 20 min  [facility], [address] [place] [address] GaryChrissie APRN    Patient Instructions:     Contact your primary care provider if your insurance requires a referral.    Please arrive 15 minutes prior to your scheduled appointment. Be sure to bring your current Insurance card, Photo ID, and medication bottles or a list of your current medications.      A 24 hour notice is required to cancel any appointment or you may be charged a $40 No Show Fee.     Important: 24 hour notice is required to cancel any appointment or you may be charged a $40 No Show Fee. Please notify your physician office.         Location Instructions:     Masks are optional for all patients and visitors, unless otherwise indicated.                      Vital signs:  Temp:  [97.5 °F (36.4 °C)] 97.5 °F (36.4 °C)  Pulse:  [59] 59  Resp:  [16] 16  BP: (122)/(67) 122/67  SpO2:  [95 %] 95 %    Physical Exam:    General: No acute distress.   Respiratory:no increased work of breathing  Neurologic: No focal neurological deficits.   Musculoskeletal: Moves all extremities.  Extremities: No edema.  -----------------------------------------------------------------------------------------------  PATIENT DISCHARGE INSTRUCTIONS: See electronic chart    Lalit Garcia MD    Time spent:   32 minutes      Electronically signed by Lalit Garcia MD on 5/21/2024  6:21 AM         REVIEWER COMMENTS

## 2024-05-23 NOTE — PAYOR COMM NOTE
--------------  ADMISSION REVIEW     Payor: [employer] P[employer]  Subscriber #:  [id number]  Authorization Number: [id number]    Admit date: 5/17/24  Admit time:  5:48 PM       REVIEW DOCUMENTATION:     ED Provider Notes        Subjective:   HPI    Patient is a 64-year-old female who presents emergency department reporting that she was seen by an oral surgeon who had done a CT scan in the office noting a life-threatening infection and advised her to come to the ER for IV antibiotics.  Patient had dental extraction performed of 2 teeth couple months ago.  More recently he had developed severe pain and swelling in the lower jaw in the area where the previous tooth was extracted from.  She was seen in the ER and started on penicillin.  She then followed up with oral surgery.  At the time of that evaluation CT imaging was ordered.  The physician also switched her prescription to Augmentin.  CT imaging was then performed demonstrating retained root tips in the lower left mandible with signs of osteomyelitis and the patient was advised to come in for an urgent extraction at the office and to be set up for a PICC line.  Patient felt really overwhelmed by this diagnosis and did not have any family available at home to support her through that therefore she asked to delay.  She now presents for further care.    Details about CT imaging results discussed over the phone with Dr. Segovia's office.        Physical Exam     ED Triage Vitals [05/17/24 1246]   /89   Pulse 79   Resp 18   Temp 97.8 °F (36.6 °C)   Temp src Temporal   SpO2 100 %   O2 Device None (Room air)         Physical Exam    Constitutional: No apparent distress  Eyes: No scleral icterus  Mouth/Throat: Diffuse tenderness overlying the left posterior jaw and postextraction zone  Heart: regular rate rhythm, no murmurs  Lungs: Clear to auscultation bilaterally  Skin: No rash, warm and dry  Neuro: Alert and oriented ×3          ED Course/ My interpretations:      Labs Reviewed   COMP METABOLIC PANEL (14) - Normal   CBC WITH DIFFERENTIAL WITH PLATELET     MDM      Diagnosis was considered for the patient including osteomyelitis, abscess, postextraction complication, facial cellulitis, parotitis, sepsis and other pathology.    Patient started on IV antibiotics after cultures obtained.    Details of CT imaging obtained by Contacting the oral surgeons office.  Admission disposition: 5/17/2024  4:47 PM         Patient admitted to hospital for further evaluation and treatment.  Case discussed with Fang Parikh over the phone.  He is in agreement with emergency department management and disposition of the patient.        Disposition and Plan     Clinical Impression:  1. Osteomyelitis, jaw acute         Disposition:  Admit  5/17/2024  4:47 pm      FANG HOSPITALIST  History and Physical      Chief Complaint: Jaw infection        Subjective:  History of Present Illness:   Jimena Goel is a 64 year old female with PMHx anxiety, depression, hypothyroidism, HTN, DM type II who presents to the hospital after being sent in by her oral surgeon.  Patient had dental extraction to left wisdom teeth a few months ago.  She developed pain and swelling where the previous tooth was extracted from and was seen in the ER and prescribed penicillin.  She then followed up with oral surgery who switched her to Augmentin.  A CT scan was also ordered which showed retained root tips in the left lower mandible with signs of osteomyelitis.  She was then advised to go to an endodontist which she did the same day and plan was for root canal the following day. She also got a call from oral surgeon that she will need jaw surgery the following day due to CT scan results.  However, patient had to delay this as she was overwhelmed and did not have any family at home.  She now presents to the ER.  She was given Unasyn and cultures were taken. Oral surgery consulted.       Assessment &  Plan:  #Recent dental extraction now with retained root tips in the left lower mandible with signs of osteomyelitis  -Blood cultures taken  -IV antibiotics  -Oral surgery on consult  -ID consult  -Pain control  -NPO at midnight     #Hypertension  -Continue home hydrochlorothiazide     #Hyperlipidemia  -Continue home statin     #Prediabetes  -Hold metformin  -Hold of accuchecks and insulin as glucose on labs normal     #Hypothyroidism  -Levothyroxine      #Anxiety  #Depression  -Continue home venlafaxine     Plan of care discussed with patient, RN.     Nato Dotson, DO        5/18  ID:      Reason for Consultation:  Osteomyelitis of L mandible     History of Present Illness:  Jimena Goel is a 64 year old female with a history of hypothyroidism. Patient presents for further treatment of her left jaw/mouth and for PICC placement.      Patient states that she initially had 2 teeth extracted 3/2024. She reports that she then had a dental cleaning 5/9. She noticed pain and swelling to her L jaw about 6 hours later. Denies any erythema, fevers or chills. She was seen in the ED 5/11. She was dc with PCN and chlorhexidine mouth wash. She was advised to see oral surgery.      She went to see oral surgery 5/13 and had a CT that showed retained root tips and signs of OM. She was changed to po augmentin at that time. There was also concern for compromise of an additional lower left tooth. She was advised to see an endodontist for a root canal of that tooth which she had done 5/14. She reports she was supposed to have surgery Wednesday with the oral surgeon but was unable to coordinate with her family. She states she was advised by oral surgery to go to ED to get PICC line for IV abx.      Patient does endorse some facial flushing with Augmentin but states she has been tolerating Unasyn without any difficulty. Afebrile. WBC wnl. Her jaw swelling has improved from last week. Pain also much better.       ASSESSMENT:  Osteomyelitis to left mandible  S/p dental extraction of 2 teeth 3/2024. Outpatient CT with retained root tips and and c/w osteomyelitis per chart review.  Has been on PCN, followed by Augmentin outpatient x 1 week  Hypothyroidism      PLAN:  - continue IV Unasyn  - oral surgery on consult--> per RN, plans for outpatient procedure. Recommend sending culture and bone bx if possible to help guide treatment.  - obtain copy of CT report if able  - follow temps and wbc  - follow blood cultures  - will place PICC and plan for 6 weeks of IV abx with CTX and po flagyl. Will need a dose of IV CTX prior to dc on the day of dc.  - reviewed labs, micro, imaging reports, available old records     Discussed case with RN, patient, HANK Mendoza and Dr. Pearson.     Thank you for allowing us to participate in the care of this patient. Please do not hesitate to call if you have any questions.   We will continue to follow with you and will make further recommendations based on her progress.     GLENIS Lund         5/19 HOSPITALIST:    Chief Complaint: OM        Subjective:  Patient denies new complaints.     Assessment & Plan:  Left mandibular osteomyelitis  Prediabetes  Hypothyroidism on Synthroid   Essential hypertension  Dyslipidemia on Lipitor   Anxiety  Depression     Plan:  Stop IVF  IV abx  Pain control  PICC placed 5/18  ID & Oral surgery consult   SW consult for outpatient abx  Hold hydrochlorothiazide  Monitor hemodynamics           Supplementary Documentation:  Quality:  DVT Prophylaxis: Lovenox     At this point Ms. Goel is expected to be discharge to: Home     Plan of care discussed with patient and RN.     Greg Hunt MD          5/20 ID:     ASSESSMENT:  Osteomyelitis to left mandible  S/p dental extraction of 2 teeth 3/2024. Outpatient CT with retained root tips and and c/w osteomyelitis per chart review.  Has been on PCN, followed by Augmentin outpatient x 1 week  Hypothyroidism       PLAN:  - continue IV Unasyn  - oral surgery on consult--> per RN 5/18, plans for outpatient procedure. Recommend sending culture and bone bx if possible to help guide treatment.  - obtain copy of CT report if able  - follow temps and wbc  - follow blood cultures- ngtd  - PICC placed to RUE 5/18 and plan for 6 weeks of IV abx with lnvanz. Will need a dose prior to dc on the day of dc. Ok for dc planning from ID standpoint, once IV abx are arranged.     Discussed case with RN, patient, SW and Dr. Walton.     GLENIS Lund   [facility]  [phone]     ID ATTENDING ADDENDUM     Pt seen an examined independently. Chart reviewed. Agree with above. Note has been reviewed by me and modified as needed.  Exam and Impression/ Recs as noted above.  Will follow up with me in 2-3 weeks. Will also f/u with Dr Segovia  D/w staff and with pt  More than 50% of clinical time and 100% of the clinical decision making performed by me.     Gladis Walton MD     Vitals (last day) before discharge       Date/Time Temp Pulse Resp BP SpO2 Weight O2 Device O2 Flow Rate (L/min) Who    05/20/24 0747 97.5 °F (36.4 °C) 59 16 122/67 95 % -- None (Room air) 0 L/min TS    05/20/24 0430 98 °F (36.7 °C) 54 18 117/65 98 % -- None (Room air) -- VB    05/19/24 1930 98.6 °F (37 °C) 69 16 159/84 98 % -- None (Room air) -- VB    05/19/24 1645 98 °F (36.7 °C) 68 16 144/81 97 % -- -- -- DW    05/19/24 1100 98.2 °F (36.8 °C) 57 18 139/75 98 % -- -- -- DW    05/19/24 0335 97.8 °F (36.6 °C) 62 19 107/60 97 % -- None (Room air) -- JS

## 2024-05-24 ENCOUNTER — OFFICE VISIT (OUTPATIENT)
Dept: INTERNAL MEDICINE CLINIC | Facility: CLINIC | Age: 64
End: 2024-05-24

## 2024-05-24 VITALS
RESPIRATION RATE: 18 BRPM | HEIGHT: 67.5 IN | SYSTOLIC BLOOD PRESSURE: 112 MMHG | OXYGEN SATURATION: 98 % | DIASTOLIC BLOOD PRESSURE: 78 MMHG | BODY MASS INDEX: 30.56 KG/M2 | TEMPERATURE: 97 F | HEART RATE: 90 BPM | WEIGHT: 197 LBS

## 2024-05-24 DIAGNOSIS — M27.2 OSTEOMYELITIS, JAW ACUTE: Primary | ICD-10-CM

## 2024-05-24 DIAGNOSIS — I10 HYPERTENSION, BENIGN: ICD-10-CM

## 2024-05-24 DIAGNOSIS — R19.7 DIARRHEA, UNSPECIFIED TYPE: ICD-10-CM

## 2024-05-24 RX ORDER — MELATONIN
1000 DAILY
COMMUNITY

## 2024-05-24 RX ORDER — CHLORAL HYDRATE 500 MG
1000 CAPSULE ORAL DAILY
COMMUNITY

## 2024-05-24 NOTE — PROGRESS NOTES
TCM VISIT  [facility] TRANSITIONAL CARE CLINIC      HISTORY   CHIEF COMPLAINT: HFU-acute osteomyelitis of jaw, HTN, diarrhea.     HPI: Jimena Goel is a 64 year old female here today for hospital follow up for acute osteomyelitis of the jaw, HTN, diarrhea.  Jimena Goel was discharged from [facility]  to Home or Self Care.  Admit Date: 5/17/24. Discharge Date: 5/20/24.     Hospital Discharge Diagnosis:     Left mandibular osteomyelitis; treated with IV abx, seen by ID during stay.  Will need oral surgery OP followup. No complications.  Prediabetes  Hypothyroidism on Synthroid   Essential hypertension  Dyslipidemia on Lipitor   Anxiety  Depression    Hospital stay was uncomplicated.  Jimena Goel was discharge with PICC line, PICC line care, ertapenem IV through 7/1, weekly labs, stop Augmentin, Peridex, ibuprofen and a referral to the [facility] for continued follow up.      Today, patient is being seen for hospital follow-up.  She reports doing good since discharge.  She is accompanied by her  at time of exam.    She presented to ED after being sent in by her oral surgeon.  She had dental extraction to left wisdom teeth a few months ago.  She developed pain and swelling where the previous tooth was extracted from and was seen in ED and prescribed penicillin.  She then followed up with oral surgery who switched her to Augmentin.  CT scan was also ordered which showed retained root tips in the left lower mandible with signs of osteomyelitis.  She was then advised to go to an endodontist which she did the same day and plan was for root canal the following day.  She got a call from her oral surgeon that she will need jaw surgery the following day due to CT scan results.  The patient had delayed this as she was overwhelmed and did not have any family at home.  She now presents to ED.  She was given IV Unasyn and cultures were taken.  Oral surgery was consulted.  She was found with left mandibular  osteomyelitis.  Treated with IV antibiotics and seen by infectious disease in the hospital.  Will need oral surgery outpatient.  She had PICC line placed and was felt to need to go home on continued IV antibiotics which were arranged by .  She was cleared by consultants and discharged home in stable condition.    Interactive contact within 2 business days post discharge first initiated on Date: 2024      Allergies:  Allergies   Allergen Reactions    Augmentin [Amoxicillin-Pot Clavulanate] FACE FLUSHING    Latex OTHER (SEE COMMENTS)     Made skin on hands raw when wearing latex gloves      Current Meds:  Current Outpatient Medications   Medication Sig Dispense Refill    ertapenem 1 g Injection Recon Soln Inject 1 g into the vein daily. Will need weekly labs with CBC, BMP, ESR and CRP while on this medication. 42 each 0    SEMAGLUTIDE,0.25 OR 0.5MG/DOS, SC Inject 0.125 mg into the skin once a week.       hydroCHLOROthiazide 12.5 MG Oral Tab Take 1 tablet (12.5 mg total) by mouth daily. 90 tablet 0    levothyroxine 88 MCG Oral Tab Take 1 tablet (88 mcg total) by mouth before breakfast. 90 tablet 1    metFORMIN HCl 1000 MG Oral Tab Take 1 tablet (1,000 mg total) by mouth 2 (two) times daily with meals. 180 tablet 0    venlafaxine ER 75 MG Oral Capsule SR 24 Hr Take 1 capsule (75 mg total) by mouth every morning. 90 capsule 0    BABY ASPIRIN OR Take 81 mg by mouth daily.      atorvastatin 20 MG Oral Tab Take 1 tablet (20 mg total) by mouth nightly.       No current facility-administered medications for this visit.       HISTORY: reconciled and reviewed with patient  Past Medical History:    ANXIETY    Anxiety    DEPRESSION    Depression    High cholesterol    HYPOTHYROIDISM    Night sweats    Thyroid disease    Weight gain      Past Surgical History:   Procedure Laterality Date    Back surgery            X 2      Family History   Problem Relation Age of Onset    Breast Cancer Maternal  Grandmother 53    Diabetes Maternal Grandmother     Thyroid disease Mother     Heart Disease Father     Hypertension Father     Heart Attack Father     Hypertension Maternal Grandfather     Heart Attack Maternal Grandfather     Colon Cancer Brother     Heart Attack Paternal Uncle     Genetic Disease Paternal Grandfather       Social History     Socioeconomic History    Marital status:     Number of children: 2   Tobacco Use    Smoking status: Former     Current packs/day: 0.00     Types: Cigarettes     Quit date:      Years since quittin.4    Smokeless tobacco: Never    Tobacco comments:     5-10 per week    Vaping Use    Vaping status: Every Day   Substance and Sexual Activity    Alcohol use: Yes     Alcohol/week: 0.0 standard drinks of alcohol     Comment: occasional    Drug use: No   Other Topics Concern     Service No    Blood Transfusions No    Caffeine Concern Yes     Comment: 2 cups per day     Occupational Exposure No    Hobby Hazards No    Sleep Concern No    Stress Concern Yes    Weight Concern Yes     Comment: would like to lose    Special Diet No    Back Care No    Exercise No    Bike Helmet Yes    Seat Belt Yes    Self-Exams Yes     Social Determinants of Health     Food Insecurity: No Food Insecurity (2024)    Food Insecurity     Food Insecurity: Never true   Transportation Needs: No Transportation Needs (2024)    Transportation Needs     Lack of Transportation: No   Physical Activity: Sufficiently Active (2019)    Received from [facility], [facility]    Exercise Vital Sign     Days of Exercise per Week: 2 days     Minutes of Exercise per Session: 90 min   Housing Stability: Low Risk  (2024)    Housing Stability     Housing Instability: No        Imaging & Consults:        Lab Results   Component Value Date/Time    WBC 9.5 2024 12:58 PM    HGB 14.6 2024 12:58 PM    HCT 41.1 2024 12:58 PM    .0 2024 12:58  PM    GLU 95 05/17/2024 12:58 PM     05/17/2024 12:58 PM    K 3.6 05/17/2024 12:58 PM     05/17/2024 12:58 PM    CO2 25.0 05/17/2024 12:58 PM    BUN 23 05/17/2024 12:58 PM    CREATSERUM 0.97 05/17/2024 12:58 PM    CA 9.4 05/17/2024 12:58 PM    ALB 3.9 05/17/2024 12:58 PM    ALT 25 05/17/2024 12:58 PM    AST 26 05/17/2024 12:58 PM    A1C 5.5 11/21/2022 10:11 AM       Immunization History   Administered Date(s) Administered    Covid-19 Vaccine Pfizer 30 mcg/0.3 ml 03/27/2021, 04/17/2021, 12/10/2021    Covid-19 Vaccine Pfizer Bivalent 30mcg/0.3mL 09/27/2022    FLULAVAL 6 months & older 0.5 ml Prefilled syringe (00311) 10/02/2020, 10/01/2021    FLUZONE 6 months and older PFS 0.5 ml (11698) 10/02/2020, 10/01/2021, 10/02/2023    TDAP 11/07/2014    Zoster Vaccine Recombinant Adjuvanted (Shingrix) 01/28/2023, 04/24/2023       ROS:  GENERAL: energy stable, denies fever, weakness  SKIN: denies any skin changes, + right PICC line in place with dressing D/I  EYES: denies blurred vision, eye pain, + mild left swelling near mouth with small bruise present  HEENT: denies ear pain, loss of hearing, sore throat  RESPIRATORY: denies cough, denies dyspnea with exertion  CARDIOVASCULAR: denies syncope, chest pain, fatigue, palpitations   GI: denies abdominal pain, melena, constipation, + diarrhea, denies nausea, vomiting   MUSCULOSKELETAL: denies pain, states normal range of motion of extremities  NEUROLOGIC: denies confusion, balance difficulty  PSYCHIATRIC: Hx depression or anxiety  HEMATOLOGIC: denies history of anemia, + facial bruising, denies bleeding    PHYSICAL EXAM:  Vitals:    05/24/24 1310   BP: 112/78   Pulse: 90   Resp: 18   Temp: 96.8 °F (36 °C)       GENERAL: well developed, well nourished, in no apparent distress, good historian  INTEGUMENTARY: warm, dry, no rashes, + right PICC line in place with dressing D/I  HEENT: atraumatic, normocephalic, sclera white, conjunctivae pink  NECK: supple  CHEST: no chest  tenderness  LUNGS: clear to auscultation bilaterally, no wheezes, rhonchi or rales, normal respiratory effort  CARDIO: S1, S2, RRR without audible murmur  GI: + BS to all quadrants, no tenderness  MUSCULOSKELETAL: + ROM in all joints, no evidence of swelling, pain   EXTREMITIES: no cyanosis, or edema  NEURO: Oriented x3  PSYCHIATRIC: appropriate affect    ASSESSMENT/ PLAN:   1. Osteomyelitis, jaw acute  Had dental extraction of left wisdom teeth a few months prior and developed pain and swelling for the previous tooth was extracted from  Was seen in ED and started on penicillin-followed up with oral surgeon who switched her to Augmentin  CT scan was done which showed retained root tips in the left lower mandible with signs of osteomyelitis  She was advised to go to an endodontist and the plan was for root canal the following day  She got a call from her oral surgeon saying she would need jaw surgery as well the following day due to CT scan results  She was eventually sent to ED by oral surgeon for PICC line placement and IV antibiotics  She was found to have left mandibular osteomyelitis and treated with IV antibiotics  Per infectious disease will need continued IV antibiotics postdischarge  She reports some ongoing mouth discomfort but is not taking any medication  There is a small area of swelling to the left side of her face with a small bruise present  Denies any trouble chewing or swallowing  Right PICC line in place with dressing D/I  Weekly dressing change on Tuesdays at [facility]  Weekly lab draws on Tuesdays at [facility] of CBC, BMP, ESR, CRP with results to infectious disease  All PICC line supplies are being delivered by [facility] and patient is infusing antibiotics independently at home  Started on:  Ertapenem 1 g IV daily through 7/1  She had follow-up with oral surgeon Dr. Ochoa on 5/23 and had oral surgery done-next follow-up is 5/30  Has follow-up with endodontist on 5/31 for second half of her  root canal  Follow-up with infectious disease Dr. Walton on 6/4 at 11:15 AM  Follow-up with weight loss clinic [facility] on 6/17 at 11:40 AM  Follow-up with PCP Dr. Gifford on 6/7 at 11 AM  All hospital records, labs, radiology from current hospitalization reviewed    2. Hypertension, benign  BP was stable in office today at 112/78  Patient not currently checking BP at  Continue:  Hydrochlorothiazide 12.5 mg daily  Continue to monitor for any S/S of hyper hypotension    3. Diarrhea, unspecified type  Patient reports developing looser stools on Wednesday which progressed to watery diarrhea on Thursday and Friday  Reports she is having watery diarrhea every time she eats which is approximately 2 times per day and are normal size and quantity  She does report pure water  Denies any mucus  Denies any foul odor  Was recommended to start:  Probiotic 1 capsule daily through the end of her antibiotic course and continue for 1 to 2 weeks after completion of antibiotics  She is to notify PCP/TCC if any change in quantity, quality, mucus, odor, or develop any other S/S of infection to have stool studies done      Orders Placed This Encounter    omega-3 fatty acids 1000 MG Oral Cap     Sig: Take 1,000 mg by mouth daily.    cholecalciferol 25 MCG (1000 UT) Oral Tab     Sig: Take 1 tablet (1,000 Units total) by mouth daily.    MAGNESIUM GLYCINATE OR     Sig: Take 1 tablet by mouth every evening.           Medications & Refills for this Visit:   omega-3 fatty acids 1000 MG Oral Cap Take 1,000 mg by mouth daily.      cholecalciferol 25 MCG (1000 UT) Oral Tab Take 1 tablet (1,000 Units total) by mouth daily.      MAGNESIUM GLYCINATE OR Take 1 tablet by mouth every evening.      ertapenem 1 g Injection Recon Soln Inject 1 g into the vein daily. Will need weekly labs with CBC, BMP, ESR and CRP while on this medication. 42 each 0    hydroCHLOROthiazide 12.5 MG Oral Tab Take 1 tablet (12.5 mg total) by mouth daily. 90 tablet  0    levothyroxine 88 MCG Oral Tab Take 1 tablet (88 mcg total) by mouth before breakfast. 90 tablet 1    metFORMIN HCl 1000 MG Oral Tab Take 1 tablet (1,000 mg total) by mouth 2 (two) times daily with meals. 180 tablet 0    venlafaxine ER 75 MG Oral Capsule SR 24 Hr Take 1 capsule (75 mg total) by mouth every morning. 90 capsule 0    BABY ASPIRIN OR Take 81 mg by mouth daily.      atorvastatin 20 MG Oral Tab Take 1 tablet (20 mg total) by mouth nightly.       Requested Prescriptions      No prescriptions requested or ordered in this encounter         Health Maintenance:  Health Maintenance   Topic Date Due    Pneumococcal Vaccine: Birth to 64yrs (1 of 2 - PCV) Never done    COVID-19 Vaccine (5 - 2023-24 season) 09/01/2023    Colorectal Cancer Screening  09/06/2023    DTaP,Tdap,and Td Vaccines (2 - Td or Tdap) 11/07/2024    Mammogram  02/21/2025    Annual Physical  04/17/2025    Pap Smear  04/17/2027    Influenza Vaccine  Completed    Annual Depression Screening  Completed    Zoster Vaccines  Completed       Chronic Care Management Referral: N/A      Transitional Care Management Certification:  During the visit, the following was completed:  Obtained and reviewed discharge summary, continuity of care documents, notes, and discharge information   Reviewed Labs (CBC, CMP), Labs (Microbiology), PICC line placement notes, lactic acid, need for follow-up on pending tests, need for follow-up on diagnostic tests, and need for follow-up on treatments    Medication Reconciliation:  I am aware of an inpatient discharge within the last 30 days.  The discharge medication list has been reconciled with the patient's current medication list and reviewed by me.  See medication list for additions of new medication, and changes to current doses of medications and discontinued medications.        Discharge Disposition/Plan:     Future Appointments   Date Time Provider Department Center   6/7/2024 11:00 AM Ila Gifford DO [facility]  [address]   6/17/2024 11:40 AM Chrissie Vega APRN [facility] [address]      1.  Transitional Care Clinic Visit: Next visit Discharged  2.  PCP Visit: 6/7/2024  3.  Chronic Care Management: N/A     VIMAL Martinez, 5/24/2024  [facility]  [phone]

## 2024-05-24 NOTE — PATIENT INSTRUCTIONS
Gladis Walton MD  Infectious Diseases  [facility]  [address] [place] [id number]  [phone]  Tuesday 6/4 @11/:15  Bring ID and insurance

## 2024-05-24 NOTE — PROGRESS NOTES
TRANSITIONAL CARE CLINIC PHARMACIST MEDICATION RECONCILIATION        Jimena Goel MRN [id number]    1960 PCP Ila Gifford DO       Comments: Medication history completed by the Transitional Care Clinic Pharmacist with the patient and spouse in the office.    The following medication changes occurred while patient was hospitalized:  Medications Started:  Ertapenem 1g IVPB - Inject 1g into the vein daily (therapy to complete on 24)    Medications Stopped:  Amoxicillin-Clavulanate 875-125mg tabs  Chlorhexidine Gluconate 0.12% soln  Ibuprofen 400mg tabs    Outpatient Encounter Medications as of 2024   Medication Sig    omega-3 fatty acids 1000 MG Oral Cap Take 1,000 mg by mouth daily.    cholecalciferol 25 MCG (1000 UT) Oral Tab Take 1 tablet (1,000 Units total) by mouth daily.    MAGNESIUM GLYCINATE OR Take 1 tablet by mouth every evening.    ertapenem 1 g Injection Recon Soln Inject 1 g into the vein daily. Will need weekly labs with CBC, BMP, ESR and CRP while on this medication.    hydroCHLOROthiazide 12.5 MG Oral Tab Take 1 tablet (12.5 mg total) by mouth daily.    levothyroxine 88 MCG Oral Tab Take 1 tablet (88 mcg total) by mouth before breakfast.    metFORMIN HCl 1000 MG Oral Tab Take 1 tablet (1,000 mg total) by mouth 2 (two) times daily with meals.    venlafaxine ER 75 MG Oral Capsule SR 24 Hr Take 1 capsule (75 mg total) by mouth every morning.    BABY ASPIRIN OR Take 81 mg by mouth daily.    atorvastatin 20 MG Oral Tab Take 1 tablet (20 mg total) by mouth nightly.     Medication Adherence Assessment:   Patient reports taking the antibiotic daily as prescribed.  States she has started to have diarrhea after each meal.  Says it is water and not solid.  Patient has been on multiple antibiotics over the past few weeks.  May need to assess for C Diff.  APRN to assess further.  If no stool sample needed, the patient can start taking a probiotic as directed on the bottle and continue to take  until the antibiotic is finished and for at least a month after completion.  Patient understands.    Taking all other medications as prescribed.  Reports no longer taking Semaglutide, so removed from list.  Patient reports taking Fish Oil, Vitamin D3 and wants to restart a magnesium supplement.  Counseled the patient that she can restart the magnesium.  Updated medication list with current medications, OTC's and supplements.    Reviewed all medications in detail with patient including dose, indication, timing of administration, monitoring parameters, and potential side effects of medications.   Patient confirmed understanding.     Thank you,    Julieta Walter, PharmD, 5/24/2024, 1:31 PM  Transitional Care Clinic

## 2024-05-27 PROBLEM — R19.7 DIARRHEA: Status: ACTIVE | Noted: 2024-05-27

## 2024-06-07 ENCOUNTER — OFFICE VISIT (OUTPATIENT)
Dept: FAMILY MEDICINE CLINIC | Facility: CLINIC | Age: 64
End: 2024-06-07
Payer: COMMERCIAL

## 2024-06-07 VITALS
HEART RATE: 88 BPM | DIASTOLIC BLOOD PRESSURE: 84 MMHG | OXYGEN SATURATION: 98 % | BODY MASS INDEX: 32.65 KG/M2 | HEIGHT: 67 IN | SYSTOLIC BLOOD PRESSURE: 122 MMHG | WEIGHT: 208 LBS | RESPIRATION RATE: 16 BRPM

## 2024-06-07 DIAGNOSIS — Z51.81 THERAPEUTIC DRUG MONITORING: ICD-10-CM

## 2024-06-07 DIAGNOSIS — R73.03 PREDIABETES: Primary | ICD-10-CM

## 2024-06-07 PROCEDURE — 3008F BODY MASS INDEX DOCD: CPT | Performed by: FAMILY MEDICINE

## 2024-06-07 PROCEDURE — 3079F DIAST BP 80-89 MM HG: CPT | Performed by: FAMILY MEDICINE

## 2024-06-07 PROCEDURE — 3074F SYST BP LT 130 MM HG: CPT | Performed by: FAMILY MEDICINE

## 2024-06-07 PROCEDURE — 99213 OFFICE O/P EST LOW 20 MIN: CPT | Performed by: FAMILY MEDICINE

## 2024-06-07 NOTE — PROGRESS NOTES
HPI:   Jimena Goel is a 64 year old female who presents for follow up   Pt on abx via PICC line for jaw infection   Under care by Dr ascencio and ID     Pt reports the ozempic did not work   Was previously on a much higher dose     Current Outpatient Medications   Medication Sig Dispense Refill    omega-3 fatty acids 1000 MG Oral Cap Take 1,000 mg by mouth daily.      cholecalciferol 25 MCG (1000 UT) Oral Tab Take 1 tablet (1,000 Units total) by mouth daily.      MAGNESIUM GLYCINATE OR Take 1 tablet by mouth every evening.      ertapenem 1 g Injection Recon Soln Inject 1 g into the vein daily. Will need weekly labs with CBC, BMP, ESR and CRP while on this medication. 42 each 0    hydroCHLOROthiazide 12.5 MG Oral Tab Take 1 tablet (12.5 mg total) by mouth daily. 90 tablet 0    levothyroxine 88 MCG Oral Tab Take 1 tablet (88 mcg total) by mouth before breakfast. 90 tablet 1    metFORMIN HCl 1000 MG Oral Tab Take 1 tablet (1,000 mg total) by mouth 2 (two) times daily with meals. (Patient taking differently: Take 0.5 tablets (500 mg total) by mouth 2 (two) times daily with meals.) 180 tablet 0    venlafaxine ER 75 MG Oral Capsule SR 24 Hr Take 1 capsule (75 mg total) by mouth every morning. 90 capsule 0    BABY ASPIRIN OR Take 81 mg by mouth daily.      atorvastatin 20 MG Oral Tab Take 1 tablet (20 mg total) by mouth nightly.        Past Medical History:    ANXIETY    Anxiety    DEPRESSION    Depression    High cholesterol    HYPOTHYROIDISM    Night sweats    Thyroid disease    Weight gain      Past Surgical History:   Procedure Laterality Date    Back surgery            X 2      Family History   Problem Relation Age of Onset    Breast Cancer Maternal Grandmother 53    Diabetes Maternal Grandmother     Thyroid disease Mother     Heart Disease Father     Hypertension Father     Heart Attack Father     Hypertension Maternal Grandfather     Heart Attack Maternal Grandfather     Colon Cancer Brother     Heart  Attack Paternal Uncle     Genetic Disease Paternal Grandfather       Social History:   Social History     Socioeconomic History    Marital status:     Number of children: 2   Tobacco Use    Smoking status: Former     Current packs/day: 0.00     Types: Cigarettes     Quit date:      Years since quittin.4    Smokeless tobacco: Never    Tobacco comments:     5-10 per week    Vaping Use    Vaping status: Never Used   Substance and Sexual Activity    Alcohol use: Yes     Alcohol/week: 0.0 standard drinks of alcohol     Comment: occasional    Drug use: No   Other Topics Concern     Service No    Blood Transfusions No    Caffeine Concern Yes     Comment: 2 cups per day     Occupational Exposure No    Hobby Hazards No    Sleep Concern No    Stress Concern Yes    Weight Concern Yes     Comment: would like to lose    Special Diet No    Back Care No    Exercise No    Bike Helmet Yes    Seat Belt Yes    Self-Exams Yes     Social Determinants of Health     Food Insecurity: No Food Insecurity (2024)    Food Insecurity     Food Insecurity: Never true   Transportation Needs: No Transportation Needs (2024)    Transportation Needs     Lack of Transportation: No   Physical Activity: Sufficiently Active (2019)    Received from [email or web address]    Exercise Vital Sign     Days of Exercise per Week: 2 days     Minutes of Exercise per Session: 90 min   Housing Stability: Low Risk  (2024)    Housing Stability     Housing Instability: No     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   Diet: watches minimally     REVIEW OF SYSTEMS:   GENERAL: feels well otherwise  SKIN: denies any unusual skin lesions  EYES:denies blurred vision or double vision  HEENT: denies nasal congestion, sinus pain or ST  LUNGS: denies shortness of breath with exertion  CARDIOVASCULAR: denies chest pain on exertion  GI: denies abdominal pain,denies  heartburn  : denies dysuria, vaginal discharge or itchin   MUSCULOSKELETAL: denies back pain  NEURO: denies headaches  PSYCHE: denies depression or anxiety  HEMATOLOGIC: denies hx of anemia  ENDOCRINE:  thyroid history  ALL/ASTHMA: denies asthma    EXAM:   /84   Pulse 88   Resp 16   Ht 5' 7\" (1.702 m)   Wt 208 lb (94.3 kg)   LMP  (LMP Unknown)   SpO2 98%   BMI 32.58 kg/m²   Body mass index is 32.58 kg/m².   GENERAL: alert and oriented X 3, well developed, well nourished,in no apparent distress  NEURO: cranial nerves are intact,motor and sensory are grossly intact  Right arm: no s/s infection of PICC     ASSESSMENT AND PLAN:   Jimena Goel is a 64 year old female who presents for    1. Prediabetes      2. Therapeutic drug monitoring    Trial of mounjaro from compounding pharmacy    Questions answered and patient indicates understanding of these issues and agrees to the plan.  Follow up in 3 mo or sooner if needed

## 2024-08-27 DIAGNOSIS — R73.03 PREDIABETES: ICD-10-CM

## 2024-08-27 DIAGNOSIS — Z51.81 THERAPEUTIC DRUG MONITORING: ICD-10-CM

## 2024-08-27 NOTE — TELEPHONE ENCOUNTER
A refill request was received for:  Requested Prescriptions     Pending Prescriptions Disp Refills    METFORMIN HCL 1000 MG Oral Tab [Pharmacy Med Name: metFORMIN HCl Oral Tablet 1000 MG] 180 tablet 0     Sig: TAKE 1 TABLET BY MOUTH 2 TIMES A DAY WITH MEALS       Last refill date:4/2024     No A1c in last 12 months  Last office visit: 6/7/2024    Follow up due:  Future Appointments   Date Time Provider Department Center   9/3/2024  2:30 PM Ila Gifford DO [facility] [address]

## 2024-09-01 NOTE — PROGRESS NOTES
HPI:   Jimena Goel is a 64 year old female who presents for follow up on wegovy     Pt has been back on 0.5mg of wegovy for 2 mo   Pt was previously on it with phentermine and saw better results   No medication SE   Was previously on the mounjaro due to wegovy SE     Diet has been good   Not doing great with exercise     Pt reports low energy     Current Outpatient Medications   Medication Sig Dispense Refill    semaglutide-weight management 1 MG/0.5ML Subcutaneous Solution Auto-injector Inject 0.5 mL (1 mg total) into the skin once a week for 4 doses. 2 mL 0    METFORMIN HCL 1000 MG Oral Tab TAKE 1 TABLET BY MOUTH 2 TIMES A DAY WITH MEALS 180 tablet 0    omega-3 fatty acids 1000 MG Oral Cap Take 1,000 mg by mouth daily.      cholecalciferol 25 MCG (1000 UT) Oral Tab Take 1 tablet (1,000 Units total) by mouth daily.      MAGNESIUM GLYCINATE OR Take 1 tablet by mouth every evening.      hydroCHLOROthiazide 12.5 MG Oral Tab Take 1 tablet (12.5 mg total) by mouth daily. 90 tablet 0    levothyroxine 88 MCG Oral Tab Take 1 tablet (88 mcg total) by mouth before breakfast. 90 tablet 1    venlafaxine ER 75 MG Oral Capsule SR 24 Hr Take 1 capsule (75 mg total) by mouth every morning. 90 capsule 0    BABY ASPIRIN OR Take 81 mg by mouth daily.      atorvastatin 20 MG Oral Tab Take 1 tablet (20 mg total) by mouth nightly.        Past Medical History:    ANXIETY    Anxiety    DEPRESSION    Depression    High cholesterol    HYPOTHYROIDISM    Night sweats    Thyroid disease    Weight gain      Past Surgical History:   Procedure Laterality Date    Back surgery            X 2      Family History   Problem Relation Age of Onset    Breast Cancer Maternal Grandmother 53    Diabetes Maternal Grandmother     Thyroid disease Mother     Heart Disease Father     Hypertension Father     Heart Attack Father     Hypertension Maternal Grandfather     Heart Attack Maternal Grandfather     Colon Cancer Brother     Heart Attack  Paternal Uncle     Genetic Disease Paternal Grandfather       Social History:   Social History     Socioeconomic History    Marital status:     Number of children: 2   Tobacco Use    Smoking status: Former     Current packs/day: 0.00     Types: Cigarettes     Quit date:      Years since quittin.6    Smokeless tobacco: Never    Tobacco comments:     5-10 per week    Vaping Use    Vaping status: Never Used   Substance and Sexual Activity    Alcohol use: Yes     Alcohol/week: 0.0 standard drinks of alcohol     Comment: occasional    Drug use: No   Other Topics Concern     Service No    Blood Transfusions No    Caffeine Concern Yes     Comment: 2 cups per day     Occupational Exposure No    Hobby Hazards No    Sleep Concern No    Stress Concern Yes    Weight Concern Yes     Comment: would like to lose    Special Diet No    Back Care No    Exercise No    Bike Helmet Yes    Seat Belt Yes    Self-Exams Yes     Social Determinants of Health     Food Insecurity: No Food Insecurity (2024)    Food Insecurity     Food Insecurity: Never true   Transportation Needs: No Transportation Needs (2024)    Transportation Needs     Lack of Transportation: No   Physical Activity: Sufficiently Active (2019)    Received from [facility] [employer], [facility]    Exercise Vital Sign     Days of Exercise per Week: 2 days     Minutes of Exercise per Session: 90 min   Housing Stability: Low Risk  (2024)    Housing Stability     Housing Instability: No     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   Diet: watches minimally     REVIEW OF SYSTEMS:   GENERAL: feels well otherwise  SKIN: denies any unusual skin lesions  EYES:denies blurred vision or double vision  HEENT: denies nasal congestion, sinus pain or ST  LUNGS: denies shortness of breath with exertion  CARDIOVASCULAR: denies chest pain on exertion  GI: denies abdominal pain,denies heartburn  :  denies dysuria, vaginal discharge or itchin   MUSCULOSKELETAL: denies back pain  NEURO: denies headaches  PSYCHE: denies depression or anxiety  HEMATOLOGIC: denies hx of anemia  ENDOCRINE:  thyroid history  ALL/ASTHMA: denies asthma    EXAM:   /84   Pulse 78   Resp 16   Ht 5' 7\" (1.702 m)   Wt 205 lb (93 kg)   LMP  (LMP Unknown)   SpO2 98%   BMI 32.11 kg/m²   Body mass index is 32.11 kg/m².   GENERAL: alert and oriented X 3, well developed, well nourished,in no apparent distress  NEURO: cranial nerves are intact,motor and sensory are grossly intact      ASSESSMENT AND PLAN:   Jimena Goel is a 64 year old female who presents with     1. Class 1 obesity with serious comorbidity and body mass index (BMI) of 32.0 to 32.9 in adult, unspecified obesity type  Increase dose   - semaglutide-weight management 1 MG/0.5ML Subcutaneous Solution Auto-injector; Inject 0.5 mL (1 mg total) into the skin once a week for 4 doses.  Dispense: 2 mL; Refill: 0    2. Encounter for weight management    - semaglutide-weight management 1 MG/0.5ML Subcutaneous Solution Auto-injector; Inject 0.5 mL (1 mg total) into the skin once a week for 4 doses.  Dispense: 2 mL; Refill: 0    3. Prediabetes    - semaglutide-weight management 1 MG/0.5ML Subcutaneous Solution Auto-injector; Inject 0.5 mL (1 mg total) into the skin once a week for 4 doses.  Dispense: 2 mL; Refill: 0      Questions answered and patient indicates understanding of these issues and agrees to the plan.  Follow up in 3 mo or sooner if needed

## 2024-09-03 ENCOUNTER — OFFICE VISIT (OUTPATIENT)
Dept: FAMILY MEDICINE CLINIC | Facility: CLINIC | Age: 64
End: 2024-09-03
Payer: COMMERCIAL

## 2024-09-03 VITALS
RESPIRATION RATE: 16 BRPM | HEIGHT: 67 IN | WEIGHT: 205 LBS | BODY MASS INDEX: 32.18 KG/M2 | DIASTOLIC BLOOD PRESSURE: 84 MMHG | HEART RATE: 78 BPM | SYSTOLIC BLOOD PRESSURE: 120 MMHG | OXYGEN SATURATION: 98 %

## 2024-09-03 DIAGNOSIS — Z76.89 ENCOUNTER FOR WEIGHT MANAGEMENT: ICD-10-CM

## 2024-09-03 DIAGNOSIS — E66.9 CLASS 1 OBESITY WITH SERIOUS COMORBIDITY AND BODY MASS INDEX (BMI) OF 32.0 TO 32.9 IN ADULT, UNSPECIFIED OBESITY TYPE: Primary | ICD-10-CM

## 2024-09-03 DIAGNOSIS — R73.03 PREDIABETES: ICD-10-CM

## 2024-09-03 PROCEDURE — 3074F SYST BP LT 130 MM HG: CPT | Performed by: FAMILY MEDICINE

## 2024-09-03 PROCEDURE — 99214 OFFICE O/P EST MOD 30 MIN: CPT | Performed by: FAMILY MEDICINE

## 2024-09-03 PROCEDURE — 3008F BODY MASS INDEX DOCD: CPT | Performed by: FAMILY MEDICINE

## 2024-09-03 PROCEDURE — 3079F DIAST BP 80-89 MM HG: CPT | Performed by: FAMILY MEDICINE

## 2024-09-03 RX ORDER — SEMAGLUTIDE 0.5 MG/.5ML
INJECTION, SOLUTION SUBCUTANEOUS
COMMUNITY
Start: 2024-07-12 | End: 2024-09-03

## 2024-09-10 DIAGNOSIS — Z76.89 ENCOUNTER FOR WEIGHT MANAGEMENT: ICD-10-CM

## 2024-09-10 DIAGNOSIS — E66.9 CLASS 1 OBESITY WITH SERIOUS COMORBIDITY AND BODY MASS INDEX (BMI) OF 32.0 TO 32.9 IN ADULT, UNSPECIFIED OBESITY TYPE: ICD-10-CM

## 2024-09-10 DIAGNOSIS — R73.03 PREDIABETES: ICD-10-CM

## 2024-09-10 RX ORDER — SEMAGLUTIDE 1 MG/.5ML
INJECTION, SOLUTION SUBCUTANEOUS
Qty: 2 ML | Refills: 0 | OUTPATIENT
Start: 2024-09-10

## 2024-09-18 DIAGNOSIS — R45.86 LABILE MOOD: ICD-10-CM

## 2024-09-18 RX ORDER — VENLAFAXINE HYDROCHLORIDE 75 MG/1
75 CAPSULE, EXTENDED RELEASE ORAL EVERY MORNING
Qty: 90 CAPSULE | Refills: 0 | Status: SHIPPED | OUTPATIENT
Start: 2024-09-18

## 2024-10-05 ENCOUNTER — PATIENT MESSAGE (OUTPATIENT)
Dept: FAMILY MEDICINE CLINIC | Facility: CLINIC | Age: 64
End: 2024-10-05

## 2024-10-05 DIAGNOSIS — E66.811 CLASS 1 OBESITY WITH SERIOUS COMORBIDITY AND BODY MASS INDEX (BMI) OF 32.0 TO 32.9 IN ADULT, UNSPECIFIED OBESITY TYPE: ICD-10-CM

## 2024-10-05 DIAGNOSIS — R73.03 PREDIABETES: ICD-10-CM

## 2024-10-05 DIAGNOSIS — Z76.89 ENCOUNTER FOR WEIGHT MANAGEMENT: ICD-10-CM

## 2024-10-07 NOTE — TELEPHONE ENCOUNTER
From: Jimena Ortez  To: Ila Gifford  Sent: 10/5/2024 9:58 AM CDT  Subject: Wegovy 1mg.    Hi Dr. Gifford,  I need a refill for Wegovy 1mg. Sent to [facility] pharmacy. I’d like to stay on this last dose for another month if possible.  Thanks   Jimena ortez

## 2024-10-10 DIAGNOSIS — E03.9 HYPOTHYROIDISM (ACQUIRED): ICD-10-CM

## 2024-10-10 RX ORDER — LEVOTHYROXINE SODIUM 88 UG/1
88 TABLET ORAL
Qty: 90 TABLET | Refills: 0 | Status: SHIPPED | OUTPATIENT
Start: 2024-10-10

## 2024-10-11 RX ORDER — HYDROCHLOROTHIAZIDE 12.5 MG/1
12.5 TABLET ORAL DAILY
Qty: 90 TABLET | Refills: 0 | Status: SHIPPED | OUTPATIENT
Start: 2024-10-11

## 2024-11-05 ENCOUNTER — PATIENT MESSAGE (OUTPATIENT)
Dept: FAMILY MEDICINE CLINIC | Facility: CLINIC | Age: 64
End: 2024-11-05

## 2024-11-05 DIAGNOSIS — Z76.89 ENCOUNTER FOR WEIGHT MANAGEMENT: ICD-10-CM

## 2024-11-05 DIAGNOSIS — R73.03 PREDIABETES: ICD-10-CM

## 2024-11-05 DIAGNOSIS — E66.811 CLASS 1 OBESITY WITH SERIOUS COMORBIDITY AND BODY MASS INDEX (BMI) OF 32.0 TO 32.9 IN ADULT, UNSPECIFIED OBESITY TYPE: ICD-10-CM

## 2024-11-06 NOTE — TELEPHONE ENCOUNTER
Requesting   Requested Prescriptions     Pending Prescriptions Disp Refills    semaglutide-weight management 1 MG/0.5ML Subcutaneous Solution Auto-injector 2 mL 0     Sig: Inject 0.5 mL (1 mg total) into the skin once a week for 4 doses.       LOV: 9/3/24    Filled: 9/3/24 #2ml with 0 refills    Future Appointments   Date Time Provider Department Center   12/3/2024 12:30 PM Ila Gifford,  [facility] [address] [address]

## 2024-11-08 DIAGNOSIS — R73.03 PREDIABETES: ICD-10-CM

## 2024-11-08 DIAGNOSIS — Z76.89 ENCOUNTER FOR WEIGHT MANAGEMENT: ICD-10-CM

## 2024-11-08 DIAGNOSIS — E66.811 CLASS 1 OBESITY WITH SERIOUS COMORBIDITY AND BODY MASS INDEX (BMI) OF 32.0 TO 32.9 IN ADULT, UNSPECIFIED OBESITY TYPE: ICD-10-CM

## 2024-11-08 RX ORDER — SEMAGLUTIDE 1 MG/.5ML
INJECTION, SOLUTION SUBCUTANEOUS
Qty: 2 ML | Refills: 0 | OUTPATIENT
Start: 2024-11-08

## 2024-11-11 ENCOUNTER — LAB ENCOUNTER (OUTPATIENT)
Dept: LAB | Age: 64
End: 2024-11-11
Attending: INTERNAL MEDICINE
Payer: COMMERCIAL

## 2024-11-11 DIAGNOSIS — E78.00 PURE HYPERCHOLESTEROLEMIA: ICD-10-CM

## 2024-11-11 DIAGNOSIS — I10 HYPERTENSION, BENIGN: Primary | ICD-10-CM

## 2024-11-11 DIAGNOSIS — Z00.00 ANNUAL PHYSICAL EXAM: ICD-10-CM

## 2024-11-11 LAB
ALBUMIN SERPL-MCNC: 4.4 G/DL (ref 3.2–4.8)
ALBUMIN/GLOB SERPL: 1.5 {RATIO} (ref 1–2)
ALP LIVER SERPL-CCNC: 89 U/L
ALT SERPL-CCNC: 14 U/L
ANION GAP SERPL CALC-SCNC: 5 MMOL/L (ref 0–18)
AST SERPL-CCNC: 19 U/L (ref ?–34)
BASOPHILS # BLD AUTO: 0.04 X10(3) UL (ref 0–0.2)
BASOPHILS NFR BLD AUTO: 0.8 %
BILIRUB SERPL-MCNC: 0.7 MG/DL (ref 0.2–1.1)
BUN BLD-MCNC: 14 MG/DL (ref 9–23)
CALCIUM BLD-MCNC: 10.3 MG/DL (ref 8.7–10.4)
CHLORIDE SERPL-SCNC: 108 MMOL/L (ref 98–112)
CHOLEST SERPL-MCNC: 125 MG/DL (ref ?–200)
CO2 SERPL-SCNC: 26 MMOL/L (ref 21–32)
CREAT BLD-MCNC: 0.85 MG/DL
EGFRCR SERPLBLD CKD-EPI 2021: 76 ML/MIN/1.73M2 (ref 60–?)
EOSINOPHIL # BLD AUTO: 0.1 X10(3) UL (ref 0–0.7)
EOSINOPHIL NFR BLD AUTO: 1.9 %
ERYTHROCYTE [DISTWIDTH] IN BLOOD BY AUTOMATED COUNT: 13.2 %
EST. AVERAGE GLUCOSE BLD GHB EST-MCNC: 111 MG/DL (ref 68–126)
FASTING PATIENT LIPID ANSWER: YES
FASTING STATUS PATIENT QL REPORTED: YES
GLOBULIN PLAS-MCNC: 3 G/DL (ref 2–3.5)
GLUCOSE BLD-MCNC: 123 MG/DL (ref 70–99)
HBA1C MFR BLD: 5.5 % (ref ?–5.7)
HCT VFR BLD AUTO: 44.7 %
HDLC SERPL-MCNC: 48 MG/DL (ref 40–59)
HGB BLD-MCNC: 15.2 G/DL
IMM GRANULOCYTES # BLD AUTO: 0.02 X10(3) UL (ref 0–1)
IMM GRANULOCYTES NFR BLD: 0.4 %
LDLC SERPL CALC-MCNC: 58 MG/DL (ref ?–100)
LYMPHOCYTES # BLD AUTO: 1.75 X10(3) UL (ref 1–4)
LYMPHOCYTES NFR BLD AUTO: 33 %
MCH RBC QN AUTO: 29.5 PG (ref 26–34)
MCHC RBC AUTO-ENTMCNC: 34 G/DL (ref 31–37)
MCV RBC AUTO: 86.6 FL
MONOCYTES # BLD AUTO: 0.29 X10(3) UL (ref 0.1–1)
MONOCYTES NFR BLD AUTO: 5.5 %
NEUTROPHILS # BLD AUTO: 3.1 X10 (3) UL (ref 1.5–7.7)
NEUTROPHILS # BLD AUTO: 3.1 X10(3) UL (ref 1.5–7.7)
NEUTROPHILS NFR BLD AUTO: 58.4 %
NONHDLC SERPL-MCNC: 77 MG/DL (ref ?–130)
OSMOLALITY SERPL CALC.SUM OF ELEC: 290 MOSM/KG (ref 275–295)
PLATELET # BLD AUTO: 280 10(3)UL (ref 150–450)
POTASSIUM SERPL-SCNC: 3.7 MMOL/L (ref 3.5–5.1)
PROT SERPL-MCNC: 7.4 G/DL (ref 5.7–8.2)
RBC # BLD AUTO: 5.16 X10(6)UL
SODIUM SERPL-SCNC: 139 MMOL/L (ref 136–145)
T4 FREE SERPL-MCNC: 1.3 NG/DL (ref 0.8–1.7)
TRIGL SERPL-MCNC: 100 MG/DL (ref 30–149)
TSI SER-ACNC: 0.3 UIU/ML (ref 0.55–4.78)
VIT B12 SERPL-MCNC: 506 PG/ML (ref 211–911)
VIT D+METAB SERPL-MCNC: 56.7 NG/ML (ref 30–100)
VLDLC SERPL CALC-MCNC: 15 MG/DL (ref 0–30)
WBC # BLD AUTO: 5.3 X10(3) UL (ref 4–11)

## 2024-11-11 PROCEDURE — 83036 HEMOGLOBIN GLYCOSYLATED A1C: CPT

## 2024-11-11 PROCEDURE — 84443 ASSAY THYROID STIM HORMONE: CPT

## 2024-11-11 PROCEDURE — 80061 LIPID PANEL: CPT

## 2024-11-11 PROCEDURE — 82607 VITAMIN B-12: CPT

## 2024-11-11 PROCEDURE — 80053 COMPREHEN METABOLIC PANEL: CPT

## 2024-11-11 PROCEDURE — 82306 VITAMIN D 25 HYDROXY: CPT

## 2024-11-11 PROCEDURE — 36415 COLL VENOUS BLD VENIPUNCTURE: CPT

## 2024-11-11 PROCEDURE — 84439 ASSAY OF FREE THYROXINE: CPT

## 2024-11-11 PROCEDURE — 85025 COMPLETE CBC W/AUTO DIFF WBC: CPT

## 2024-11-12 DIAGNOSIS — E66.811 CLASS 1 OBESITY WITH SERIOUS COMORBIDITY AND BODY MASS INDEX (BMI) OF 32.0 TO 32.9 IN ADULT, UNSPECIFIED OBESITY TYPE: Primary | ICD-10-CM

## 2024-11-23 DIAGNOSIS — Z51.81 THERAPEUTIC DRUG MONITORING: ICD-10-CM

## 2024-11-23 DIAGNOSIS — R73.03 PREDIABETES: ICD-10-CM

## 2024-11-25 NOTE — TELEPHONE ENCOUNTER
A refill request was received for:  Requested Prescriptions     Pending Prescriptions Disp Refills    METFORMIN HCL 1000 MG Oral Tab [Pharmacy Med Name: metFORMIN HCl Oral Tablet 1000 MG] 180 tablet 0     Sig: TAKE 1 TABLET BY MOUTH 2 TIMES A DAY WITH MEALS       Last refill date:       Last office visit:     Follow up due:  Future Appointments   Date Time Provider Department Center   12/3/2024 12:30 PM Ila Gifford DO [facility] [address]   5/8/2025  7:30 AM Adrai Serrato MD [facility]

## 2024-12-03 ENCOUNTER — OFFICE VISIT (OUTPATIENT)
Dept: FAMILY MEDICINE CLINIC | Facility: CLINIC | Age: 64
End: 2024-12-03
Payer: COMMERCIAL

## 2024-12-03 VITALS
OXYGEN SATURATION: 98 % | SYSTOLIC BLOOD PRESSURE: 118 MMHG | BODY MASS INDEX: 31.71 KG/M2 | HEIGHT: 67 IN | HEART RATE: 76 BPM | RESPIRATION RATE: 16 BRPM | WEIGHT: 202 LBS | DIASTOLIC BLOOD PRESSURE: 72 MMHG

## 2024-12-03 DIAGNOSIS — R06.83 SNORING: Primary | ICD-10-CM

## 2024-12-03 DIAGNOSIS — E03.9 HYPOTHYROIDISM (ACQUIRED): ICD-10-CM

## 2024-12-03 DIAGNOSIS — R73.03 PREDIABETES: ICD-10-CM

## 2024-12-03 DIAGNOSIS — R45.86 LABILE MOOD: ICD-10-CM

## 2024-12-03 DIAGNOSIS — Z51.81 THERAPEUTIC DRUG MONITORING: ICD-10-CM

## 2024-12-03 PROCEDURE — 3078F DIAST BP <80 MM HG: CPT | Performed by: FAMILY MEDICINE

## 2024-12-03 PROCEDURE — 3074F SYST BP LT 130 MM HG: CPT | Performed by: FAMILY MEDICINE

## 2024-12-03 PROCEDURE — 3008F BODY MASS INDEX DOCD: CPT | Performed by: FAMILY MEDICINE

## 2024-12-03 PROCEDURE — 90471 IMMUNIZATION ADMIN: CPT | Performed by: FAMILY MEDICINE

## 2024-12-03 PROCEDURE — 99214 OFFICE O/P EST MOD 30 MIN: CPT | Performed by: FAMILY MEDICINE

## 2024-12-03 PROCEDURE — 90715 TDAP VACCINE 7 YRS/> IM: CPT | Performed by: FAMILY MEDICINE

## 2024-12-03 RX ORDER — LEVOTHYROXINE SODIUM 88 UG/1
88 TABLET ORAL
Qty: 90 TABLET | Refills: 0 | Status: SHIPPED | OUTPATIENT
Start: 2024-12-03

## 2024-12-03 RX ORDER — HYDROCHLOROTHIAZIDE 12.5 MG/1
12.5 TABLET ORAL DAILY
Qty: 90 TABLET | Refills: 0 | Status: SHIPPED | OUTPATIENT
Start: 2024-12-03

## 2024-12-03 RX ORDER — VENLAFAXINE HYDROCHLORIDE 75 MG/1
75 CAPSULE, EXTENDED RELEASE ORAL EVERY MORNING
Qty: 90 CAPSULE | Refills: 0 | Status: SHIPPED | OUTPATIENT
Start: 2024-12-03

## 2024-12-03 NOTE — PROGRESS NOTES
HPI:   Jimena Goel is a 64 year old female who presents for follow up on MMP     Pt no longer on wegovy due to insurance  Pt now on rybelsus 14mg and tolerating   Pt not back to 180 where she was in the past but she is doing better overall   Not working out  Diet ok   No previous sleep study   + snoring     Mood good overall ; stress persists with daughter in law ; pt travels back and forth to [place] to care for her granddaughter   Well controlled on the effexor   No anxiety   No depressed   Pt denies suicidal or homicidal ideation.   Pt denies hopelessness or anhedonia.   Sleep ok    Normal appetite    Component      Latest Ref Rng 11/21/2022 7/28/2023 2/19/2024 5/17/2024   WBC      4.0 - 11.0 x10(3) uL 7.1    9.5    RBC      3.80 - 5.30 x10(6)uL 4.87    4.94    Hemoglobin      12.0 - 16.0 g/dL 14.7    14.6    Hematocrit      35.0 - 48.0 % 43.5    41.1    Platelet Count      150.0 - 450.0 10(3)uL 269.0    250.0    MCV      80.0 - 100.0 fL 89.3    83.2    MCH      26.0 - 34.0 pg 30.2    29.6    MCHC      31.0 - 37.0 g/dL 33.8    35.5    RDW      % 12.7    12.8    Prelim Neutrophil Abs      1.50 - 7.70 x10 (3) uL 4.23    6.01    Neutrophils Absolute      1.50 - 7.70 x10(3) uL 4.23    6.01    Lymphocytes Absolute      1.00 - 4.00 x10(3) uL 1.78    2.86    Monocytes Absolute      0.10 - 1.00 x10(3) uL 0.38    0.45    Eosinophils Absolute      0.00 - 0.70 x10(3) uL 0.65    0.11    Basophils Absolute      0.00 - 0.20 x10(3) uL 0.08    0.06    Immature Granulocyte Absolute      0.00 - 1.00 x10(3) uL 0.02    0.04    Neutrophils %      % 59.3    63.1    Lymphocytes %      % 24.9    30.0    Monocytes %      % 5.3    4.7    Eosinophils %      % 9.1    1.2    Basophils %      % 1.1    0.6    Immature Granulocyte %      % 0.3    0.4    Glucose      70 - 99 mg/dL 89  96  91     Sodium      136 - 145 mmol/L 141  135 (L)  139     Potassium      3.5 - 5.1 mmol/L 3.5  4.1  4.2     Chloride      98 - 112 mmol/L 106  104  106      Carbon Dioxide, Total      21.0 - 32.0 mmol/L 27.0  25.0  28.0     ANION GAP      0 - 18 mmol/L 8  6  5     BUN      9 - 23 mg/dL 17  18  19     CREATININE      0.55 - 1.02 mg/dL 0.77  0.97  0.77     CALCIUM      8.7 - 10.4 mg/dL 9.5  9.6  9.6     CALCULATED OSMOLALITY      275 - 295 mOsm/kg 293  282  290     eGFR NON-AFR. AMERICAN      >=60        eGFR       >=60        AST (SGOT)      <34 U/L 17  21  21     ALT (SGPT)      10 - 49 U/L 23  29  25     ALKALINE PHOSPHATASE      50 - 130 U/L 78  97  97     Total Bilirubin      0.2 - 1.1 mg/dL 0.5  0.5  0.4     PROTEIN, TOTAL      5.7 - 8.2 g/dL 7.0  7.6  7.4     Albumin      3.2 - 4.8 g/dL 3.8  3.8  3.6     Globulin      2.0 - 3.5 g/dL 3.2  3.8  3.8     A/G Ratio      1.0 - 2.0  1.2  1.0  0.9 (L)     Patient Fasting for CMP? Yes  Yes  Yes     EGFR      >=60 mL/min/1.73m2 87  66  87     Cholesterol, Total      <200 mg/dL 105  135  130     HDL Cholesterol      40 - 59 mg/dL 56  71 (H)  68 (H)     Triglycerides      30 - 149 mg/dL 92  83  73     LDL Cholesterol Calc      <100 mg/dL 31  48  47     VLDL      0 - 30 mg/dL 12  12  10     NON-HDL CHOLESTEROL      <130 mg/dL 49  64  62     Patient Fasting for Lipid? Yes  Yes  Yes     HEMOGLOBIN A1c      <5.7 % 5.5       ESTIMATED AVERAGE GLUCOSE      68 - 126 mg/dL 111       TSH      0.550 - 4.780 uIU/mL 2.310  1.710  0.860     T4,Free (Direct)      0.8 - 1.7 ng/dL 1.4       Vitamin B12      211 - 911 pg/mL 508       VITAMIN D, 25-OH, TOTAL      30.0 - 100.0 ng/mL 58.5         Component      Latest Ref Rn 11/11/2024   WBC      4.0 - 11.0 x10(3) uL 5.3    RBC      3.80 - 5.30 x10(6)uL 5.16    Hemoglobin      12.0 - 16.0 g/dL 15.2    Hematocrit      35.0 - 48.0 % 44.7    Platelet Count      150.0 - 450.0 10(3)uL 280.0    MCV      80.0 - 100.0 fL 86.6    MCH      26.0 - 34.0 pg 29.5    MCHC      31.0 - 37.0 g/dL 34.0    RDW      % 13.2    Prelim Neutrophil Abs      1.50 - 7.70 x10 (3) uL 3.10    Neutrophils  Absolute      1.50 - 7.70 x10(3) uL 3.10    Lymphocytes Absolute      1.00 - 4.00 x10(3) uL 1.75    Monocytes Absolute      0.10 - 1.00 x10(3) uL 0.29    Eosinophils Absolute      0.00 - 0.70 x10(3) uL 0.10    Basophils Absolute      0.00 - 0.20 x10(3) uL 0.04    Immature Granulocyte Absolute      0.00 - 1.00 x10(3) uL 0.02    Neutrophils %      % 58.4    Lymphocytes %      % 33.0    Monocytes %      % 5.5    Eosinophils %      % 1.9    Basophils %      % 0.8    Immature Granulocyte %      % 0.4    Glucose      70 - 99 mg/dL 123 (H)    Sodium      136 - 145 mmol/L 139    Potassium      3.5 - 5.1 mmol/L 3.7    Chloride      98 - 112 mmol/L 108    Carbon Dioxide, Total      21.0 - 32.0 mmol/L 26.0    ANION GAP      0 - 18 mmol/L 5    BUN      9 - 23 mg/dL 14    CREATININE      0.55 - 1.02 mg/dL 0.85    CALCIUM      8.7 - 10.4 mg/dL 10.3    CALCULATED OSMOLALITY      275 - 295 mOsm/kg 290    eGFR NON-AFR. AMERICAN      >=60     eGFR       >=60     AST (SGOT)      <34 U/L 19    ALT (SGPT)      10 - 49 U/L 14    ALKALINE PHOSPHATASE      50 - 130 U/L 89    Total Bilirubin      0.2 - 1.1 mg/dL 0.7    PROTEIN, TOTAL      5.7 - 8.2 g/dL 7.4    Albumin      3.2 - 4.8 g/dL 4.4    Globulin      2.0 - 3.5 g/dL 3.0    A/G Ratio      1.0 - 2.0  1.5    Patient Fasting for CMP? Yes    EGFR      >=60 mL/min/1.73m2 76    Cholesterol, Total      <200 mg/dL 125    HDL Cholesterol      40 - 59 mg/dL 48    Triglycerides      30 - 149 mg/dL 100    LDL Cholesterol Calc      <100 mg/dL 58    VLDL      0 - 30 mg/dL 15    NON-HDL CHOLESTEROL      <130 mg/dL 77    Patient Fasting for Lipid? Yes    HEMOGLOBIN A1c      <5.7 % 5.5    ESTIMATED AVERAGE GLUCOSE      68 - 126 mg/dL 111    TSH      0.550 - 4.780 uIU/mL 0.298 (L)    T4,Free (Direct)      0.8 - 1.7 ng/dL 1.3    Vitamin B12      211 - 911 pg/mL 506    VITAMIN D, 25-OH, TOTAL      30.0 - 100.0 ng/mL 56.7       Legend:  (L) Low  (H) High    Current Outpatient Medications    Medication Sig Dispense Refill    METFORMIN HCL 1000 MG Oral Tab TAKE 1 TABLET BY MOUTH 2 TIMES A DAY WITH MEALS 180 tablet 0    PEG 3350-KCl-Na Bicarb-NaCl 420 g Oral Recon Soln Take as directed by physician 4000 mL 0    HYDROCHLOROTHIAZIDE 12.5 MG Oral Tab TAKE 1 TABLET BY MOUTH EVERY DAY 90 tablet 0    LEVOTHYROXINE 88 MCG Oral Tab TAKE 1 TABLET BY MOUTH EVERY DAY BEFORE BREAKFAST 90 tablet 0    VENLAFAXINE ER 75 MG Oral Capsule SR 24 Hr TAKE 1 CAPSULE BY MOUTH IN THE MORNING 90 capsule 0    omega-3 fatty acids 1000 MG Oral Cap Take 1,000 mg by mouth daily.      cholecalciferol 25 MCG (1000 UT) Oral Tab Take 1 tablet (1,000 Units total) by mouth daily.      MAGNESIUM GLYCINATE OR Take 1 tablet by mouth every evening.      BABY ASPIRIN OR Take 81 mg by mouth daily.      atorvastatin 20 MG Oral Tab Take 1 tablet (20 mg total) by mouth nightly.        Past Medical History:    ANXIETY    Anxiety    DEPRESSION    Depression    High cholesterol    HYPOTHYROIDISM    Night sweats    Thyroid disease    Weight gain      Past Surgical History:   Procedure Laterality Date    Back surgery            X 2      Family History   Problem Relation Age of Onset    Breast Cancer Maternal Grandmother 53    Diabetes Maternal Grandmother     Thyroid disease Mother     Heart Disease Father     Hypertension Father     Heart Attack Father     Hypertension Maternal Grandfather     Heart Attack Maternal Grandfather     Colon Cancer Brother     Heart Attack Paternal Uncle     Genetic Disease Paternal Grandfather       Social History:   Social History     Socioeconomic History    Marital status:     Number of children: 2   Tobacco Use    Smoking status: Former     Current packs/day: 0.00     Types: Cigarettes     Quit date:      Years since quittin.9    Smokeless tobacco: Never    Tobacco comments:     5-10 per week    Vaping Use    Vaping status: Never Used   Substance and Sexual Activity    Alcohol use: Yes      Alcohol/week: 0.0 standard drinks of alcohol     Comment: occasional    Drug use: No   Other Topics Concern     Service No    Blood Transfusions No    Caffeine Concern Yes     Comment: 2 cups per day     Occupational Exposure No    Hobby Hazards No    Sleep Concern No    Stress Concern Yes    Weight Concern Yes     Comment: would like to lose    Special Diet No    Back Care No    Exercise No    Bike Helmet Yes    Seat Belt Yes    Self-Exams Yes     Social Drivers of Health     Food Insecurity: No Food Insecurity (5/17/2024)    Food Insecurity     Food Insecurity: Never true   Transportation Needs: No Transportation Needs (5/17/2024)    Transportation Needs     Lack of Transportation: No   Physical Activity: Sufficiently Active (12/17/2019)    Received from [facility] [employer]    Exercise Vital Sign     Days of Exercise per Week: 2 days     Minutes of Exercise per Session: 90 min   Housing Stability: Low Risk  (5/17/2024)    Housing Stability     Housing Instability: No     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   Diet: watches minimally     REVIEW OF SYSTEMS:   GENERAL: feels well otherwise  SKIN: denies any unusual skin lesions  EYES:denies blurred vision or double vision  HEENT: denies nasal congestion, sinus pain or ST  LUNGS: denies shortness of breath with exertion  CARDIOVASCULAR: denies chest pain on exertion  GI: denies abdominal pain,denies heartburn  : denies dysuria, vaginal discharge or itchin   MUSCULOSKELETAL: denies back pain  NEURO: denies headaches  PSYCHE: denies depression or anxiety  HEMATOLOGIC: denies hx of anemia  ENDOCRINE:  thyroid history  ALL/ASTHMA: denies asthma    EXAM:   /72   Pulse 76   Resp 16   Ht 5' 7\" (1.702 m)   Wt 202 lb (91.6 kg)   LMP  (LMP Unknown)   SpO2 98%   BMI 31.64 kg/m²   Body mass index is 31.64 kg/m².   GENERAL: alert and oriented X 3, well developed, well nourished,in no  apparent distress  NEURO: cranial nerves are intact,motor and sensory are grossly intact      ASSESSMENT AND PLAN:   Jimena Goel is a 64 year old female who presents with     1. Hypothyroidism (acquired)    - Free T4, (Free Thyroxine); Future  - Assay, Thyroid Stim Hormone; Future  - levothyroxine 88 MCG Oral Tab; Take 1 tablet (88 mcg total) by mouth before breakfast.  Dispense: 90 tablet; Refill: 0    2. Prediabetes    - metFORMIN HCl 1000 MG Oral Tab; Take 1 tablet (1,000 mg total) by mouth 2 (two) times daily with meals.  Dispense: 180 tablet; Refill: 0  - Hemoglobin A1C; Future    3. Therapeutic drug monitoring    - metFORMIN HCl 1000 MG Oral Tab; Take 1 tablet (1,000 mg total) by mouth 2 (two) times daily with meals.  Dispense: 180 tablet; Refill: 0    4. Labile mood    - venlafaxine ER 75 MG Oral Capsule SR 24 Hr; Take 1 capsule (75 mg total) by mouth every morning.  Dispense: 90 capsule; Refill: 0    5. Snoring    - Diagnostic Sleep Study-split night PAP implemented if criteria met  - General sleep study; Future      Questions answered and patient indicates understanding of these issues and agrees to the plan.  Follow up in 3 mo or sooner if needed

## 2025-01-14 ENCOUNTER — HOSPITAL ENCOUNTER (OUTPATIENT)
Dept: BONE DENSITY | Age: 65
Discharge: HOME | End: 2025-01-14
Attending: FAMILY MEDICINE
Payer: COMMERCIAL

## 2025-01-14 DIAGNOSIS — Z13.820 SCREENING FOR OSTEOPOROSIS: ICD-10-CM

## 2025-01-14 PROCEDURE — 77080 DXA BONE DENSITY AXIAL: CPT | Performed by: FAMILY MEDICINE

## 2025-03-01 ENCOUNTER — HOSPITAL ENCOUNTER (OUTPATIENT)
Dept: MAMMOGRAPHY | Age: 65
Discharge: HOME | End: 2025-03-01
Attending: FAMILY MEDICINE
Payer: COMMERCIAL

## 2025-03-01 DIAGNOSIS — Z12.31 ENCOUNTER FOR SCREENING MAMMOGRAM FOR HIGH-RISK PATIENT: ICD-10-CM

## 2025-03-01 PROCEDURE — 77063 BREAST TOMOSYNTHESIS BI: CPT | Performed by: FAMILY MEDICINE

## 2025-03-01 PROCEDURE — 77067 SCR MAMMO BI INCL CAD: CPT | Performed by: FAMILY MEDICINE

## 2025-03-14 DIAGNOSIS — R73.03 PREDIABETES: ICD-10-CM

## 2025-03-14 DIAGNOSIS — Z51.81 THERAPEUTIC DRUG MONITORING: ICD-10-CM

## 2025-03-14 DIAGNOSIS — R45.86 LABILE MOOD: ICD-10-CM

## 2025-03-14 RX ORDER — VENLAFAXINE HYDROCHLORIDE 75 MG/1
75 CAPSULE, EXTENDED RELEASE ORAL EVERY MORNING
Qty: 90 CAPSULE | Refills: 0 | Status: SHIPPED | OUTPATIENT
Start: 2025-03-14

## 2025-03-14 NOTE — TELEPHONE ENCOUNTER
A refill request was received for:  Requested Prescriptions     Pending Prescriptions Disp Refills    METFORMIN HCL 1000 MG Oral Tab [Pharmacy Med Name: metFORMIN HCl Oral Tablet 1000 MG] 180 tablet 0     Sig: TAKE 1 TABLET BY MOUTH 2 TIMES A DAY WITH MEALS    VENLAFAXINE ER 75 MG Oral Capsule SR 24 Hr [Pharmacy Med Name: Venlafaxine HCl ER Oral Capsule Extended Release 24 Hour 75 MG] 90 capsule 0     Sig: TAKE 1 CAPSULE BY MOUTH IN THE MORNING       Last refill date:     Metformin 12/3/24  Venlafaxine 12/3/24    Last office visit: 12/3/24    Follow up due: March 2025  Future Appointments   Date Time Provider Department Center   5/8/2025  7:30 AM Adria Serrato MD [facility] GI

## 2025-03-28 DIAGNOSIS — R45.86 LABILE MOOD: ICD-10-CM

## 2025-03-28 RX ORDER — VENLAFAXINE HYDROCHLORIDE 75 MG/1
75 CAPSULE, EXTENDED RELEASE ORAL EVERY MORNING
Qty: 90 CAPSULE | Refills: 0 | OUTPATIENT
Start: 2025-03-28

## 2025-04-02 ENCOUNTER — PATIENT MESSAGE (OUTPATIENT)
Dept: FAMILY MEDICINE CLINIC | Facility: CLINIC | Age: 65
End: 2025-04-02

## 2025-05-05 ENCOUNTER — LAB ENCOUNTER (OUTPATIENT)
Dept: LAB | Age: 65
End: 2025-05-05
Attending: FAMILY MEDICINE
Payer: COMMERCIAL

## 2025-05-05 ENCOUNTER — LAB ENCOUNTER (OUTPATIENT)
Dept: LAB | Age: 65
End: 2025-05-05
Attending: INTERNAL MEDICINE
Payer: COMMERCIAL

## 2025-05-05 DIAGNOSIS — E03.9 HYPOTHYROIDISM (ACQUIRED): ICD-10-CM

## 2025-05-05 DIAGNOSIS — E78.00 PURE HYPERCHOLESTEROLEMIA: ICD-10-CM

## 2025-05-05 DIAGNOSIS — R73.03 PREDIABETES: ICD-10-CM

## 2025-05-05 DIAGNOSIS — R60.0 LOCALIZED EDEMA: Primary | ICD-10-CM

## 2025-05-05 DIAGNOSIS — E78.1 PURE HYPERGLYCERIDEMIA: ICD-10-CM

## 2025-05-05 DIAGNOSIS — I10 ESSENTIAL HYPERTENSION, MALIGNANT: ICD-10-CM

## 2025-05-05 LAB
ALBUMIN SERPL-MCNC: 4.9 G/DL (ref 3.2–4.8)
ALBUMIN/GLOB SERPL: 1.8 {RATIO} (ref 1–2)
ALP LIVER SERPL-CCNC: 84 U/L (ref 50–130)
ALT SERPL-CCNC: 21 U/L (ref 10–49)
ANION GAP SERPL CALC-SCNC: 14 MMOL/L (ref 0–18)
AST SERPL-CCNC: 28 U/L (ref ?–34)
BILIRUB SERPL-MCNC: 0.6 MG/DL (ref 0.2–1.1)
BUN BLD-MCNC: 15 MG/DL (ref 9–23)
CALCIUM BLD-MCNC: 10.2 MG/DL (ref 8.7–10.6)
CHLORIDE SERPL-SCNC: 105 MMOL/L (ref 98–112)
CHOLEST SERPL-MCNC: 122 MG/DL (ref ?–200)
CO2 SERPL-SCNC: 23 MMOL/L (ref 21–32)
CREAT BLD-MCNC: 0.93 MG/DL (ref 0.55–1.02)
EGFRCR SERPLBLD CKD-EPI 2021: 68 ML/MIN/1.73M2 (ref 60–?)
EST. AVERAGE GLUCOSE BLD GHB EST-MCNC: 117 MG/DL (ref 68–126)
FASTING PATIENT LIPID ANSWER: YES
FASTING STATUS PATIENT QL REPORTED: YES
GLOBULIN PLAS-MCNC: 2.8 G/DL (ref 2–3.5)
GLUCOSE BLD-MCNC: 115 MG/DL (ref 70–99)
HBA1C MFR BLD: 5.7 % (ref ?–5.7)
HDLC SERPL-MCNC: 60 MG/DL (ref 40–59)
LDLC SERPL CALC-MCNC: 43 MG/DL (ref ?–100)
NONHDLC SERPL-MCNC: 62 MG/DL (ref ?–130)
OSMOLALITY SERPL CALC.SUM OF ELEC: 296 MOSM/KG (ref 275–295)
POTASSIUM SERPL-SCNC: 4.3 MMOL/L (ref 3.5–5.1)
PROT SERPL-MCNC: 7.7 G/DL (ref 5.7–8.2)
SODIUM SERPL-SCNC: 142 MMOL/L (ref 136–145)
T4 FREE SERPL-MCNC: 1.4 NG/DL (ref 0.8–1.7)
TRIGL SERPL-MCNC: 102 MG/DL (ref 30–149)
TSI SER-ACNC: 0.6 UIU/ML (ref 0.55–4.78)
VLDLC SERPL CALC-MCNC: 14 MG/DL (ref 0–30)

## 2025-05-05 PROCEDURE — 83036 HEMOGLOBIN GLYCOSYLATED A1C: CPT | Performed by: FAMILY MEDICINE

## 2025-05-08 PROBLEM — Z86.0102 PERSONAL HISTORY OF HYPERPLASTIC COLON POLYPS: Status: ACTIVE | Noted: 2025-05-08

## 2025-05-08 PROBLEM — Z80.0 FAMILY HISTORY OF COLON CANCER: Status: ACTIVE | Noted: 2025-05-08

## 2025-05-13 RX ORDER — HYDROCHLOROTHIAZIDE 12.5 MG/1
12.5 TABLET ORAL DAILY
Qty: 90 TABLET | Refills: 0 | Status: SHIPPED | OUTPATIENT
Start: 2025-05-13

## 2025-06-06 ENCOUNTER — OFFICE VISIT (OUTPATIENT)
Dept: FAMILY MEDICINE CLINIC | Facility: CLINIC | Age: 65
End: 2025-06-06
Payer: COMMERCIAL

## 2025-06-06 VITALS
OXYGEN SATURATION: 97 % | HEART RATE: 60 BPM | HEIGHT: 67 IN | DIASTOLIC BLOOD PRESSURE: 84 MMHG | RESPIRATION RATE: 18 BRPM | SYSTOLIC BLOOD PRESSURE: 120 MMHG | WEIGHT: 215 LBS | BODY MASS INDEX: 33.74 KG/M2

## 2025-06-06 DIAGNOSIS — R45.86 LABILE MOOD: ICD-10-CM

## 2025-06-06 DIAGNOSIS — R06.83 SNORING: Primary | ICD-10-CM

## 2025-06-06 DIAGNOSIS — Z51.81 THERAPEUTIC DRUG MONITORING: ICD-10-CM

## 2025-06-06 DIAGNOSIS — E03.9 HYPOTHYROIDISM (ACQUIRED): ICD-10-CM

## 2025-06-06 DIAGNOSIS — R73.03 PREDIABETES: ICD-10-CM

## 2025-06-06 PROCEDURE — 3008F BODY MASS INDEX DOCD: CPT | Performed by: FAMILY MEDICINE

## 2025-06-06 PROCEDURE — 3074F SYST BP LT 130 MM HG: CPT | Performed by: FAMILY MEDICINE

## 2025-06-06 PROCEDURE — 3079F DIAST BP 80-89 MM HG: CPT | Performed by: FAMILY MEDICINE

## 2025-06-06 PROCEDURE — 99214 OFFICE O/P EST MOD 30 MIN: CPT | Performed by: FAMILY MEDICINE

## 2025-06-06 RX ORDER — VENLAFAXINE HYDROCHLORIDE 75 MG/1
75 CAPSULE, EXTENDED RELEASE ORAL EVERY MORNING
Qty: 90 CAPSULE | Refills: 1 | Status: SHIPPED | OUTPATIENT
Start: 2025-06-06

## 2025-06-06 RX ORDER — HYDROCHLOROTHIAZIDE 12.5 MG/1
12.5 TABLET ORAL DAILY
Qty: 90 TABLET | Refills: 1 | Status: SHIPPED | OUTPATIENT
Start: 2025-06-06

## 2025-06-06 RX ORDER — LEVOTHYROXINE SODIUM 88 UG/1
88 TABLET ORAL
Qty: 90 TABLET | Refills: 1 | Status: SHIPPED | OUTPATIENT
Start: 2025-06-06

## 2025-06-06 RX ORDER — SEMAGLUTIDE 0.25 MG/.5ML
INJECTION, SOLUTION SUBCUTANEOUS
COMMUNITY
Start: 2025-05-28 | End: 2025-06-07 | Stop reason: ALTCHOICE

## 2025-06-06 NOTE — PROGRESS NOTES
HPI:   Jimena Goel is a 65 year old female who presents for follow up on MMP     Pt no longer on wegovy due to insurance  Pt now on rybelsus 14mg and tolerating   Pt not back to 180 where she was in the past but she is doing better overall   Not working out  Diet ok   No previous sleep study   + snoring     Mood good overall ; stress persists with daughter in law ; pt travels back and forth to [place] to care for her granddaughter   Well controlled on the effexor   No anxiety   No depressed   Pt denies suicidal or homicidal ideation.   Pt denies hopelessness or anhedonia.   Sleep ok    Normal appetite    Component      Latest Ref Rng 11/21/2022 7/28/2023 2/19/2024 5/17/2024   WBC      4.0 - 11.0 x10(3) uL 7.1    9.5    RBC      3.80 - 5.30 x10(6)uL 4.87    4.94    Hemoglobin      12.0 - 16.0 g/dL 14.7    14.6    Hematocrit      35.0 - 48.0 % 43.5    41.1    Platelet Count      150.0 - 450.0 10(3)uL 269.0    250.0    MCV      80.0 - 100.0 fL 89.3    83.2    MCH      26.0 - 34.0 pg 30.2    29.6    MCHC      31.0 - 37.0 g/dL 33.8    35.5    RDW      % 12.7    12.8    Prelim Neutrophil Abs      1.50 - 7.70 x10 (3) uL 4.23    6.01    Neutrophils Absolute      1.50 - 7.70 x10(3) uL 4.23    6.01    Lymphocytes Absolute      1.00 - 4.00 x10(3) uL 1.78    2.86    Monocytes Absolute      0.10 - 1.00 x10(3) uL 0.38    0.45    Eosinophils Absolute      0.00 - 0.70 x10(3) uL 0.65    0.11    Basophils Absolute      0.00 - 0.20 x10(3) uL 0.08    0.06    Immature Granulocyte Absolute      0.00 - 1.00 x10(3) uL 0.02    0.04    Neutrophils %      % 59.3    63.1    Lymphocytes %      % 24.9    30.0    Monocytes %      % 5.3    4.7    Eosinophils %      % 9.1    1.2    Basophils %      % 1.1    0.6    Immature Granulocyte %      % 0.3    0.4    Glucose      70 - 99 mg/dL 89  96  91     Sodium      136 - 145 mmol/L 141  135 (L)  139     Potassium      3.5 - 5.1 mmol/L 3.5  4.1  4.2     Chloride      98 - 112 mmol/L 106  104  106      Carbon Dioxide, Total      21.0 - 32.0 mmol/L 27.0  25.0  28.0     ANION GAP      0 - 18 mmol/L 8  6  5     BUN      9 - 23 mg/dL 17  18  19     CREATININE      0.55 - 1.02 mg/dL 0.77  0.97  0.77     CALCIUM      8.7 - 10.4 mg/dL 9.5  9.6  9.6     CALCULATED OSMOLALITY      275 - 295 mOsm/kg 293  282  290     eGFR NON-AFR. AMERICAN      >=60        eGFR       >=60        AST (SGOT)      <34 U/L 17  21  21     ALT (SGPT)      10 - 49 U/L 23  29  25     ALKALINE PHOSPHATASE      50 - 130 U/L 78  97  97     Total Bilirubin      0.2 - 1.1 mg/dL 0.5  0.5  0.4     PROTEIN, TOTAL      5.7 - 8.2 g/dL 7.0  7.6  7.4     Albumin      3.2 - 4.8 g/dL 3.8  3.8  3.6     Globulin      2.0 - 3.5 g/dL 3.2  3.8  3.8     A/G Ratio      1.0 - 2.0  1.2  1.0  0.9 (L)     Patient Fasting for CMP? Yes  Yes  Yes     EGFR      >=60 mL/min/1.73m2 87  66  87     Cholesterol, Total      <200 mg/dL 105  135  130     HDL Cholesterol      40 - 59 mg/dL 56  71 (H)  68 (H)     Triglycerides      30 - 149 mg/dL 92  83  73     LDL Cholesterol Calc      <100 mg/dL 31  48  47     VLDL      0 - 30 mg/dL 12  12  10     NON-HDL CHOLESTEROL      <130 mg/dL 49  64  62     Patient Fasting for Lipid? Yes  Yes  Yes     HEMOGLOBIN A1c      <5.7 % 5.5       ESTIMATED AVERAGE GLUCOSE      68 - 126 mg/dL 111       TSH      0.550 - 4.780 uIU/mL 2.310  1.710  0.860     T4,Free (Direct)      0.8 - 1.7 ng/dL 1.4       Vitamin B12      211 - 911 pg/mL 508       VITAMIN D, 25-OH, TOTAL      30.0 - 100.0 ng/mL 58.5         Component      Latest Ref Rn 11/11/2024   WBC      4.0 - 11.0 x10(3) uL 5.3    RBC      3.80 - 5.30 x10(6)uL 5.16    Hemoglobin      12.0 - 16.0 g/dL 15.2    Hematocrit      35.0 - 48.0 % 44.7    Platelet Count      150.0 - 450.0 10(3)uL 280.0    MCV      80.0 - 100.0 fL 86.6    MCH      26.0 - 34.0 pg 29.5    MCHC      31.0 - 37.0 g/dL 34.0    RDW      % 13.2    Prelim Neutrophil Abs      1.50 - 7.70 x10 (3) uL 3.10    Neutrophils  Absolute      1.50 - 7.70 x10(3) uL 3.10    Lymphocytes Absolute      1.00 - 4.00 x10(3) uL 1.75    Monocytes Absolute      0.10 - 1.00 x10(3) uL 0.29    Eosinophils Absolute      0.00 - 0.70 x10(3) uL 0.10    Basophils Absolute      0.00 - 0.20 x10(3) uL 0.04    Immature Granulocyte Absolute      0.00 - 1.00 x10(3) uL 0.02    Neutrophils %      % 58.4    Lymphocytes %      % 33.0    Monocytes %      % 5.5    Eosinophils %      % 1.9    Basophils %      % 0.8    Immature Granulocyte %      % 0.4    Glucose      70 - 99 mg/dL 123 (H)    Sodium      136 - 145 mmol/L 139    Potassium      3.5 - 5.1 mmol/L 3.7    Chloride      98 - 112 mmol/L 108    Carbon Dioxide, Total      21.0 - 32.0 mmol/L 26.0    ANION GAP      0 - 18 mmol/L 5    BUN      9 - 23 mg/dL 14    CREATININE      0.55 - 1.02 mg/dL 0.85    CALCIUM      8.7 - 10.4 mg/dL 10.3    CALCULATED OSMOLALITY      275 - 295 mOsm/kg 290    eGFR NON-AFR. AMERICAN      >=60     eGFR       >=60     AST (SGOT)      <34 U/L 19    ALT (SGPT)      10 - 49 U/L 14    ALKALINE PHOSPHATASE      50 - 130 U/L 89    Total Bilirubin      0.2 - 1.1 mg/dL 0.7    PROTEIN, TOTAL      5.7 - 8.2 g/dL 7.4    Albumin      3.2 - 4.8 g/dL 4.4    Globulin      2.0 - 3.5 g/dL 3.0    A/G Ratio      1.0 - 2.0  1.5    Patient Fasting for CMP? Yes    EGFR      >=60 mL/min/1.73m2 76    Cholesterol, Total      <200 mg/dL 125    HDL Cholesterol      40 - 59 mg/dL 48    Triglycerides      30 - 149 mg/dL 100    LDL Cholesterol Calc      <100 mg/dL 58    VLDL      0 - 30 mg/dL 15    NON-HDL CHOLESTEROL      <130 mg/dL 77    Patient Fasting for Lipid? Yes    HEMOGLOBIN A1c      <5.7 % 5.5    ESTIMATED AVERAGE GLUCOSE      68 - 126 mg/dL 111    TSH      0.550 - 4.780 uIU/mL 0.298 (L)    T4,Free (Direct)      0.8 - 1.7 ng/dL 1.3    Vitamin B12      211 - 911 pg/mL 506    VITAMIN D, 25-OH, TOTAL      30.0 - 100.0 ng/mL 56.7       Legend:  (L) Low  (H) High    Current Outpatient Medications    Medication Sig Dispense Refill    hydroCHLOROthiazide 12.5 MG Oral Tab Take 1 tablet (12.5 mg total) by mouth daily. 90 tablet 0    levothyroxine 88 MCG Oral Tab Take 1 tablet (88 mcg total) by mouth before breakfast. 90 tablet 0    metFORMIN HCl 1000 MG Oral Tab Take 1 tablet (1,000 mg total) by mouth 2 (two) times daily with meals. 180 tablet 0    venlafaxine ER 75 MG Oral Capsule SR 24 Hr Take 1 capsule (75 mg total) by mouth every morning. 90 capsule 0    omega-3 fatty acids 1000 MG Oral Cap Take 1,000 mg by mouth daily.      cholecalciferol 25 MCG (1000 UT) Oral Tab Take 1 tablet (1,000 Units total) by mouth daily.      MAGNESIUM GLYCINATE OR Take 1 tablet by mouth every evening.      BABY ASPIRIN OR Take 81 mg by mouth daily.      atorvastatin 20 MG Oral Tab Take 1 tablet (20 mg total) by mouth nightly.        Past Medical History:    ANXIETY    Anxiety    DEPRESSION    Depression    Easy bruising    High cholesterol    HYPOTHYROIDISM    Night sweats    Stress    Thyroid disease    Wears glasses    Weight gain      Past Surgical History:   Procedure Laterality Date    Back surgery            X 2    Colonoscopy        Family History   Problem Relation Age of Onset    Breast Cancer Maternal Grandmother 53    Diabetes Maternal Grandmother     Thyroid disease Mother     Heart Disease Father     Hypertension Father     Heart Attack Father     Hypertension Maternal Grandfather     Heart Attack Maternal Grandfather     Colon Cancer Brother     Heart Attack Paternal Uncle     Genetic Disease Paternal Grandfather       Social History:   Social History     Socioeconomic History    Marital status:     Number of children: 2   Tobacco Use    Smoking status: Former     Current packs/day: 0.00     Types: Cigarettes     Quit date:      Years since quittin.4    Smokeless tobacco: Never    Tobacco comments:     5-10 per week    Vaping Use    Vaping status: Never Used   Substance and Sexual Activity     Alcohol use: Not Currently     Comment: occasional    Drug use: No   Other Topics Concern     Service No    Blood Transfusions No    Caffeine Concern Yes     Comment: 2 cups per day     Occupational Exposure No    Hobby Hazards No    Sleep Concern No    Stress Concern Yes    Weight Concern Yes     Comment: would like to lose    Special Diet No    Back Care No    Exercise No    Bike Helmet Yes    Seat Belt Yes    Self-Exams Yes     Social Drivers of Health     Food Insecurity: No Food Insecurity (5/17/2024)    Food Insecurity     Food Insecurity: Never true   Transportation Needs: No Transportation Needs (5/17/2024)    Transportation Needs     Lack of Transportation: No   Housing Stability: Low Risk  (5/17/2024)    Housing Stability     Housing Instability: No     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   Diet: watches minimally     REVIEW OF SYSTEMS:   GENERAL: feels well otherwise  SKIN: denies any unusual skin lesions  EYES:denies blurred vision or double vision  HEENT: denies nasal congestion, sinus pain or ST  LUNGS: denies shortness of breath with exertion  CARDIOVASCULAR: denies chest pain on exertion  GI: denies abdominal pain,denies heartburn  : denies dysuria, vaginal discharge or itchin   MUSCULOSKELETAL: denies back pain  NEURO: denies headaches  PSYCHE: denies depression or anxiety  HEMATOLOGIC: denies hx of anemia  ENDOCRINE:  thyroid history  ALL/ASTHMA: denies asthma    EXAM:   There were no vitals taken for this visit.  There is no height or weight on file to calculate BMI.   GENERAL: alert and oriented X 3, well developed, well nourished,in no apparent distress  NEURO: cranial nerves are intact,motor and sensory are grossly intact      ASSESSMENT AND PLAN:   Jimena Goel is a 65 year old female who presents with     1. Hypothyroidism (acquired)    - Free T4, (Free Thyroxine); Future  - Assay, Thyroid Stim Hormone; Future  - levothyroxine 88  MCG Oral Tab; Take 1 tablet (88 mcg total) by mouth before breakfast.  Dispense: 90 tablet; Refill: 0    2. Prediabetes    - metFORMIN HCl 1000 MG Oral Tab; Take 1 tablet (1,000 mg total) by mouth 2 (two) times daily with meals.  Dispense: 180 tablet; Refill: 0  - Hemoglobin A1C; Future    3. Therapeutic drug monitoring    - metFORMIN HCl 1000 MG Oral Tab; Take 1 tablet (1,000 mg total) by mouth 2 (two) times daily with meals.  Dispense: 180 tablet; Refill: 0    4. Labile mood    - venlafaxine ER 75 MG Oral Capsule SR 24 Hr; Take 1 capsule (75 mg total) by mouth every morning.  Dispense: 90 capsule; Refill: 0    5. Snoring    - Diagnostic Sleep Study-split night PAP implemented if criteria met  - General sleep study; Future      Questions answered and patient indicates understanding of these issues and agrees to the plan.  Follow up in 3 mo or sooner if needed

## 2025-06-06 NOTE — PROGRESS NOTES
The following individual(s) verbally consented to be recorded using ambient AI listening technology and understand that they can each withdraw their consent to this listening technology at any point by asking the clinician to turn off or pause the recording:    Patient name: Jimena Goel  Additional names:      HPI:   Jimena Goel is a 65 year old female who presents for follow up on MMP     Pt no longer on wegovy due to insurance    Diet ok   No previous sleep study   + snoring     Mood good overall   Well controlled on the effexor   No anxiety   No depressed   Pt denies suicidal or homicidal ideation.   Pt denies hopelessness or anhedonia.   Sleep ok    Normal appetite    Using hemp gummies for sleep    Component      Latest Ref Rng 11/11/2024 5/5/2025   WBC      4.0 - 11.0 x10(3) uL 5.3     RBC      3.80 - 5.30 x10(6)uL 5.16     Hemoglobin      12.0 - 16.0 g/dL 15.2     Hematocrit      35.0 - 48.0 % 44.7     Platelet Count      150.0 - 450.0 10(3)uL 280.0     MCV      80.0 - 100.0 fL 86.6     MCH      26.0 - 34.0 pg 29.5     MCHC      31.0 - 37.0 g/dL 34.0     RDW      % 13.2     Prelim Neutrophil Abs      1.50 - 7.70 x10 (3) uL 3.10     Neutrophils Absolute      1.50 - 7.70 x10(3) uL 3.10     Lymphocytes Absolute      1.00 - 4.00 x10(3) uL 1.75     Monocytes Absolute      0.10 - 1.00 x10(3) uL 0.29     Eosinophils Absolute      0.00 - 0.70 x10(3) uL 0.10     Basophils Absolute      0.00 - 0.20 x10(3) uL 0.04     Immature Granulocyte Absolute      0.00 - 1.00 x10(3) uL 0.02     Neutrophils %      % 58.4     Lymphocytes %      % 33.0     Monocytes %      % 5.5     Eosinophils %      % 1.9     Basophils %      % 0.8     Immature Granulocyte %      % 0.4     Glucose      70 - 99 mg/dL 123 (H)  115 (H)    Sodium      136 - 145 mmol/L 139  142    Potassium      3.5 - 5.1 mmol/L 3.7  4.3    Chloride      98 - 112 mmol/L 108  105    Carbon Dioxide, Total      21.0 - 32.0 mmol/L 26.0  23.0    ANION GAP       0 - 18 mmol/L 5  14    BUN      9 - 23 mg/dL 14  15    CREATININE      0.55 - 1.02 mg/dL 0.85  0.93    CALCIUM      8.7 - 10.6 mg/dL 10.3  10.2    CALCULATED OSMOLALITY      275 - 295 mOsm/kg 290  296 (H)    EGFR      >=60 mL/min/1.73m2 76  68    AST (SGOT)      <34 U/L 19  28    ALT (SGPT)      10 - 49 U/L 14  21    ALKALINE PHOSPHATASE      50 - 130 U/L 89  84    Total Bilirubin      0.2 - 1.1 mg/dL 0.7  0.6    PROTEIN, TOTAL      5.7 - 8.2 g/dL 7.4  7.7    Albumin      3.2 - 4.8 g/dL 4.4  4.9 (H)    Globulin      2.0 - 3.5 g/dL 3.0  2.8    A/G Ratio      1.0 - 2.0  1.5  1.8    Patient Fasting for CMP? Yes  Yes    Cholesterol, Total      <200 mg/dL 125  122    HDL Cholesterol      40 - 59 mg/dL 48  60 (H)    Triglycerides      30 - 149 mg/dL 100  102    LDL Cholesterol Calc      <100 mg/dL 58  43    VLDL      0 - 30 mg/dL 15  14    NON-HDL CHOLESTEROL      <130 mg/dL 77  62    Patient Fasting for Lipid? Yes  Yes    HEMOGLOBIN A1c      <5.7 % 5.5  5.7 (H)    ESTIMATED AVERAGE GLUCOSE      68 - 126 mg/dL 111  117    T4,Free (Direct)      0.8 - 1.7 ng/dL 1.3  1.4    TSH      0.550 - 4.780 uIU/mL 0.298 (L)  0.598    Vitamin B12      211 - 911 pg/mL 506     VITAMIN D, 25-OH, TOTAL      30.0 - 100.0 ng/mL 56.7        Legend:  (H) High  (L) Low    Current Outpatient Medications   Medication Sig Dispense Refill    semaglutide-weight management (WEGOVY) 0.25 MG/0.5ML Subcutaneous Solution Auto-injector Wegovy 0.25 mg/0.5 mL subcutaneous pen injector, [RxNorm: 1250574]      hydroCHLOROthiazide 12.5 MG Oral Tab Take 1 tablet (12.5 mg total) by mouth daily. 90 tablet 0    levothyroxine 88 MCG Oral Tab Take 1 tablet (88 mcg total) by mouth before breakfast. 90 tablet 0    metFORMIN HCl 1000 MG Oral Tab Take 1 tablet (1,000 mg total) by mouth 2 (two) times daily with meals. 180 tablet 0    venlafaxine ER 75 MG Oral Capsule SR 24 Hr Take 1 capsule (75 mg total) by mouth every morning. 90 capsule 0    omega-3 fatty acids 1000 MG  Oral Cap Take 1,000 mg by mouth daily.      cholecalciferol 25 MCG (1000 UT) Oral Tab Take 1 tablet (1,000 Units total) by mouth daily.      MAGNESIUM GLYCINATE OR Take 1 tablet by mouth every evening.      BABY ASPIRIN OR Take 81 mg by mouth daily.      atorvastatin 20 MG Oral Tab Take 1 tablet (20 mg total) by mouth nightly.        Past Medical History:    ANXIETY    Anxiety    DEPRESSION    Depression    Easy bruising    High cholesterol    HYPOTHYROIDISM    Night sweats    Stress    Thyroid disease    Wears glasses    Weight gain      Past Surgical History:   Procedure Laterality Date    Back surgery            X 2    Colonoscopy        Family History   Problem Relation Age of Onset    Breast Cancer Maternal Grandmother 53    Diabetes Maternal Grandmother     Thyroid disease Mother     Heart Disease Father     Hypertension Father     Heart Attack Father     Hypertension Maternal Grandfather     Heart Attack Maternal Grandfather     Colon Cancer Brother     Heart Attack Paternal Uncle     Genetic Disease Paternal Grandfather       Social History:   Social History     Socioeconomic History    Marital status:     Number of children: 2   Tobacco Use    Smoking status: Former     Current packs/day: 0.00     Types: Cigarettes     Quit date:      Years since quittin.4    Smokeless tobacco: Never    Tobacco comments:     5-10 per week    Vaping Use    Vaping status: Never Used   Substance and Sexual Activity    Alcohol use: Not Currently     Comment: occasional    Drug use: No   Other Topics Concern     Service No    Blood Transfusions No    Caffeine Concern Yes     Comment: 2 cups per day     Occupational Exposure No    Hobby Hazards No    Sleep Concern No    Stress Concern Yes    Weight Concern Yes     Comment: would like to lose    Special Diet No    Back Care No    Exercise No    Bike Helmet Yes    Seat Belt Yes    Self-Exams Yes     Social Drivers of Health     Food Insecurity: No  Food Insecurity (5/17/2024)    Food Insecurity     Food Insecurity: Never true   Transportation Needs: No Transportation Needs (5/17/2024)    Transportation Needs     Lack of Transportation: No   Housing Stability: Low Risk  (5/17/2024)    Housing Stability     Housing Instability: No     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   Diet: watches minimally     REVIEW OF SYSTEMS:   GENERAL: feels well otherwise  SKIN: denies any unusual skin lesions  EYES:denies blurred vision or double vision  HEENT: denies nasal congestion, sinus pain or ST  LUNGS: denies shortness of breath with exertion  CARDIOVASCULAR: denies chest pain on exertion  GI: denies abdominal pain,denies heartburn  : denies dysuria, vaginal discharge or itchin   MUSCULOSKELETAL: denies back pain  NEURO: denies headaches  PSYCHE: denies depression or anxiety  HEMATOLOGIC: denies hx of anemia  ENDOCRINE:  thyroid history  ALL/ASTHMA: denies asthma    EXAM:   /84   Pulse 60   Resp 18   Ht 5' 7\" (1.702 m)   Wt 215 lb (97.5 kg)   SpO2 97%   BMI 33.67 kg/m²   Body mass index is 33.67 kg/m².   GENERAL: alert and oriented X 3, well developed, well nourished,in no apparent distress  NEURO: cranial nerves are intact,motor and sensory are grossly intact      ASSESSMENT AND PLAN:   Jimena Goel is a 65 year old female who presents with     1. Snoring    - Home Sleep Apnea Test (Adult pt only) - Sleep consult required for Medicare pts  - General sleep study; Future    2. Prediabetes    - metFORMIN HCl 1000 MG Oral Tab; Take 1 tablet (1,000 mg total) by mouth 2 (two) times daily with meals.  Dispense: 180 tablet; Refill: 1    3. Hypothyroidism (acquired)    - levothyroxine 88 MCG Oral Tab; Take 1 tablet (88 mcg total) by mouth before breakfast.  Dispense: 90 tablet; Refill: 1    4. Labile mood    - venlafaxine ER 75 MG Oral Capsule SR 24 Hr; Take 1 capsule (75 mg total) by mouth every morning.  Dispense: 90  capsule; Refill: 1    5. Therapeutic drug monitoring    - metFORMIN HCl 1000 MG Oral Tab; Take 1 tablet (1,000 mg total) by mouth 2 (two) times daily with meals.  Dispense: 180 tablet; Refill: 1    Questions answered and patient indicates understanding of these issues and agrees to the plan.  Follow up in 3 mo or sooner if needed

## (undated) DIAGNOSIS — R45.86 LABILE MOOD: ICD-10-CM

## (undated) DIAGNOSIS — E03.9 HYPOTHYROIDISM (ACQUIRED): ICD-10-CM

## (undated) NOTE — LETTER
03/10/21        Birdie Kauffman  [address] [facility]      Dear Justin Aquino,    [address] [facility] records indicate that you have outstanding lab work and or testing that was ordered for you and has not yet been completed:  Orders Placed This Encounter

## (undated) NOTE — LETTER
03/20/19        Berta Morton  [address] [facility] [facility]      Dear Vero Grimm,    [address] [facility] records indicate that you have outstanding lab work and or testing that was ordered for you and has not yet been completed:  Orders Placed This

## (undated) NOTE — Clinical Note
Dr. Kitty Orozco has lost a total of #60 lbs so far! Sincerely, VIMAL Liriano APRN, FNP-BC Obesity Medicine [address] [facility]  [facility] [address] [facility], [place], [address]   [phone]. [phone]

## (undated) NOTE — Clinical Note
Dr. Natalia Barahona,   Ms. Mercedes Cohen is down a total of #68 lbs! Sincerely, VIMAL Moreno APRN, [facility]-BC Obesity Medicine [address] [facility]  [facility] [address], [address] [facility], [place], [address]   [phone] [facility]. org

## (undated) NOTE — Clinical Note
Mary Juarez,  I sent a message to Dr. Ronit Freeman (cardiologist) with this patients last office appt in 10/2021- and I have not heard anything back. Can you please call her office and see if she got the message and if not please read it to review.   Thanks,  AM

## (undated) NOTE — LETTER
07/22/20        Cruz Araiza [address]      Dear Zainab James,    [address] [facility] records indicate that you have outstanding lab work and or testing that was ordered for you and has not yet been completed:  Orders Placed This En

## (undated) NOTE — LETTER
12/10/21        [facility]  [address] [facility]      Dear Zainab James,    Our records indicate that you have outstanding lab work and or testing that was ordered for you and has not yet been completed:  Orders Placed This Encounter

## (undated) NOTE — Clinical Note
Dr. Barber Hand, I saw Dominique Sykes in the office today and think she could benefit from stimulant medication (ie. Phentermine), she wanted me to reach out to you to make sure that she would be ok with her taking it. Just let me know. LEIGH Lua

## (undated) NOTE — Clinical Note
Down a total of #88 lbs! Sincerely, VIMAL Castillo APRN, [facility]-BC Obesity Medicine [address] [facility]  [facility] [address], [address] [facility], [place], [address]  [phone] [facility]. org

## (undated) NOTE — LETTER
Your patient was recently seen at the [facility] for a hospital follow-up visit. The visit note is attached. Please contact the clinic with any questions at [phone].    Thank you,  VIMAL Martinez

## (undated) NOTE — ED AVS SNAPSHOT
Moses Dirktheresa   MRN: [id number]    Department:  [facility] in [place]   Date of Visit:  3/24/2019           Disclosure     Insurance plans vary and the physician(s) referred by the ER may not be covered by your plan.  Please conta tell this physician (or your personal doctor if your instructions are to return to your personal doctor) about any new or lasting problems. The primary care or specialist physician will see patients referred from the [facility] Emergency Department.  Sherry Fenton